# Patient Record
Sex: FEMALE | Race: WHITE | Employment: OTHER
[De-identification: names, ages, dates, MRNs, and addresses within clinical notes are randomized per-mention and may not be internally consistent; named-entity substitution may affect disease eponyms.]

---

## 2017-01-03 ENCOUNTER — CARE COORDINATION (OUTPATIENT)
Dept: CARE COORDINATION | Facility: CLINIC | Age: 82
End: 2017-01-03

## 2017-01-03 ENCOUNTER — TELEPHONE (OUTPATIENT)
Dept: INTERNAL MEDICINE | Facility: CLINIC | Age: 82
End: 2017-01-03

## 2017-01-09 ENCOUNTER — OFFICE VISIT (OUTPATIENT)
Dept: INTERNAL MEDICINE | Facility: CLINIC | Age: 82
End: 2017-01-09

## 2017-01-09 VITALS
BODY MASS INDEX: 34.22 KG/M2 | DIASTOLIC BLOOD PRESSURE: 62 MMHG | SYSTOLIC BLOOD PRESSURE: 110 MMHG | WEIGHT: 163 LBS | RESPIRATION RATE: 14 BRPM | HEIGHT: 58 IN

## 2017-01-09 DIAGNOSIS — F31.31 BIPOLAR AFFECTIVE DISORDER, CURRENTLY DEPRESSED, MILD (HCC): ICD-10-CM

## 2017-01-09 DIAGNOSIS — I50.42 HEART FAILURE, SYSTOLIC AND DIASTOLIC, CHRONIC (HCC): ICD-10-CM

## 2017-01-09 DIAGNOSIS — N18.4 CHRONIC KIDNEY DISEASE (CKD), STAGE IV (SEVERE) (HCC): ICD-10-CM

## 2017-01-09 DIAGNOSIS — N18.30 CKD (CHRONIC KIDNEY DISEASE) STAGE 3, GFR 30-59 ML/MIN (HCC): Primary | ICD-10-CM

## 2017-01-09 PROCEDURE — 99214 OFFICE O/P EST MOD 30 MIN: CPT | Performed by: INTERNAL MEDICINE

## 2017-01-09 ASSESSMENT — ENCOUNTER SYMPTOMS
ABDOMINAL PAIN: 0
SHORTNESS OF BREATH: 0

## 2017-01-11 ENCOUNTER — CARE COORDINATION (OUTPATIENT)
Dept: CARE COORDINATION | Facility: CLINIC | Age: 82
End: 2017-01-11

## 2017-01-18 ENCOUNTER — TELEPHONE (OUTPATIENT)
Dept: INTERNAL MEDICINE | Facility: CLINIC | Age: 82
End: 2017-01-18

## 2017-02-28 ENCOUNTER — OFFICE VISIT (OUTPATIENT)
Dept: INTERNAL MEDICINE | Facility: CLINIC | Age: 82
End: 2017-02-28

## 2017-02-28 VITALS
BODY MASS INDEX: 34.22 KG/M2 | RESPIRATION RATE: 14 BRPM | DIASTOLIC BLOOD PRESSURE: 62 MMHG | SYSTOLIC BLOOD PRESSURE: 122 MMHG | WEIGHT: 163 LBS | HEIGHT: 58 IN

## 2017-02-28 DIAGNOSIS — K21.9 GASTROESOPHAGEAL REFLUX DISEASE WITHOUT ESOPHAGITIS: ICD-10-CM

## 2017-02-28 DIAGNOSIS — I25.10 CORONARY ARTERY DISEASE INVOLVING NATIVE CORONARY ARTERY OF NATIVE HEART WITHOUT ANGINA PECTORIS: ICD-10-CM

## 2017-02-28 DIAGNOSIS — F31.9 BIPOLAR 1 DISORDER, DEPRESSED (HCC): Primary | ICD-10-CM

## 2017-02-28 DIAGNOSIS — I50.40 COMBINED SYSTOLIC AND DIASTOLIC CONGESTIVE HEART FAILURE, UNSPECIFIED CONGESTIVE HEART FAILURE CHRONICITY: ICD-10-CM

## 2017-02-28 PROCEDURE — 99214 OFFICE O/P EST MOD 30 MIN: CPT | Performed by: INTERNAL MEDICINE

## 2017-03-01 ASSESSMENT — ENCOUNTER SYMPTOMS
ABDOMINAL PAIN: 0
SHORTNESS OF BREATH: 1

## 2017-03-15 ENCOUNTER — TELEPHONE (OUTPATIENT)
Dept: INTERNAL MEDICINE CLINIC | Age: 82
End: 2017-03-15

## 2017-03-28 ENCOUNTER — OFFICE VISIT (OUTPATIENT)
Dept: INTERNAL MEDICINE CLINIC | Age: 82
End: 2017-03-28
Payer: COMMERCIAL

## 2017-03-28 VITALS
SYSTOLIC BLOOD PRESSURE: 122 MMHG | HEIGHT: 58 IN | WEIGHT: 165 LBS | DIASTOLIC BLOOD PRESSURE: 70 MMHG | BODY MASS INDEX: 34.63 KG/M2

## 2017-03-28 DIAGNOSIS — R19.09 SUPRAPUBIC MASS: Primary | ICD-10-CM

## 2017-03-28 DIAGNOSIS — F31.9 BIPOLAR 1 DISORDER, DEPRESSED (HCC): ICD-10-CM

## 2017-03-28 DIAGNOSIS — K21.9 GASTROESOPHAGEAL REFLUX DISEASE WITHOUT ESOPHAGITIS: ICD-10-CM

## 2017-03-28 DIAGNOSIS — K21.9 GASTROESOPHAGEAL REFLUX DISEASE, ESOPHAGITIS PRESENCE NOT SPECIFIED: ICD-10-CM

## 2017-03-28 DIAGNOSIS — I10 ESSENTIAL HYPERTENSION: ICD-10-CM

## 2017-03-28 PROCEDURE — 99214 OFFICE O/P EST MOD 30 MIN: CPT | Performed by: NURSE PRACTITIONER

## 2017-03-28 RX ORDER — OMEPRAZOLE 20 MG/1
CAPSULE, DELAYED RELEASE ORAL
Qty: 90 CAPSULE | Refills: 0 | Status: SHIPPED | OUTPATIENT
Start: 2017-03-28 | End: 2017-10-23 | Stop reason: SDUPTHER

## 2017-03-28 ASSESSMENT — ENCOUNTER SYMPTOMS
DIARRHEA: 0
NAUSEA: 0
ABDOMINAL PAIN: 1
VOMITING: 0
BLOOD IN STOOL: 0
CONSTIPATION: 0

## 2017-03-30 ENCOUNTER — HOSPITAL ENCOUNTER (OUTPATIENT)
Dept: ULTRASOUND IMAGING | Age: 82
Discharge: HOME | End: 2017-03-30
Payer: COMMERCIAL

## 2017-03-30 DIAGNOSIS — R19.07 GENERALIZED SWELLING, MASS, OR LUMP OF ABDOMEN OR PELVIS: ICD-10-CM

## 2017-03-30 PROCEDURE — 76857 US EXAM PELVIC LIMITED: CPT

## 2017-03-31 PROBLEM — K40.90 LEFT INGUINAL HERNIA: Status: ACTIVE | Noted: 2017-03-31

## 2017-04-01 ENCOUNTER — PATIENT MESSAGE (OUTPATIENT)
Dept: INTERNAL MEDICINE CLINIC | Age: 82
End: 2017-04-01

## 2017-04-04 ENCOUNTER — OFFICE VISIT (OUTPATIENT)
Dept: INTERNAL MEDICINE CLINIC | Age: 82
End: 2017-04-04
Payer: COMMERCIAL

## 2017-04-04 ENCOUNTER — HOSPITAL ENCOUNTER (OUTPATIENT)
Age: 82
Setting detail: SPECIMEN
Discharge: HOME | End: 2017-04-04
Payer: COMMERCIAL

## 2017-04-04 VITALS
RESPIRATION RATE: 14 BRPM | HEIGHT: 58 IN | WEIGHT: 164 LBS | BODY MASS INDEX: 34.43 KG/M2 | SYSTOLIC BLOOD PRESSURE: 104 MMHG | DIASTOLIC BLOOD PRESSURE: 66 MMHG

## 2017-04-04 DIAGNOSIS — J18.9 CAP (COMMUNITY ACQUIRED PNEUMONIA): ICD-10-CM

## 2017-04-04 DIAGNOSIS — I50.40 COMBINED SYSTOLIC AND DIASTOLIC CONGESTIVE HEART FAILURE, UNSPECIFIED CONGESTIVE HEART FAILURE CHRONICITY: ICD-10-CM

## 2017-04-04 DIAGNOSIS — N18.30 CKD (CHRONIC KIDNEY DISEASE) STAGE 3, GFR 30-59 ML/MIN (HCC): ICD-10-CM

## 2017-04-04 DIAGNOSIS — I20.9 ANGINA PECTORIS (HCC): ICD-10-CM

## 2017-04-04 DIAGNOSIS — K40.90 UNILATERAL INGUINAL HERNIA WITHOUT OBSTRUCTION OR GANGRENE, RECURRENCE NOT SPECIFIED: Primary | ICD-10-CM

## 2017-04-04 DIAGNOSIS — E03.8 OTHER SPECIFIED HYPOTHYROIDISM: ICD-10-CM

## 2017-04-04 PROBLEM — E03.9 HYPOTHYROIDISM: Status: ACTIVE | Noted: 2017-04-04

## 2017-04-04 LAB
ABSOLUTE EOS #: 0.3 K/UL (ref 0–0.4)
ABSOLUTE LYMPH #: 1.5 K/UL (ref 1–4.8)
ABSOLUTE MONO #: 0.6 K/UL (ref 0.1–1.2)
ALBUMIN SERPL-MCNC: 4.2 G/DL (ref 3.5–5.2)
ALBUMIN/GLOBULIN RATIO: 1.7 (ref 1–2.5)
ALP BLD-CCNC: 70 U/L (ref 35–104)
ALT SERPL-CCNC: 18 U/L (ref 5–33)
ANION GAP SERPL CALCULATED.3IONS-SCNC: 15 MMOL/L (ref 9–17)
AST SERPL-CCNC: 23 U/L
BASOPHILS # BLD: 0 % (ref 0–2)
BASOPHILS ABSOLUTE: 0 K/UL (ref 0–0.2)
BILIRUB SERPL-MCNC: 0.42 MG/DL (ref 0.3–1.2)
BUN BLDV-MCNC: 29 MG/DL (ref 8–23)
BUN/CREAT BLD: ABNORMAL (ref 9–20)
CALCIUM SERPL-MCNC: 9.1 MG/DL (ref 8.6–10.4)
CHLORIDE BLD-SCNC: 99 MMOL/L (ref 98–107)
CO2: 28 MMOL/L (ref 20–31)
CREAT SERPL-MCNC: 1.59 MG/DL (ref 0.5–0.9)
DIFFERENTIAL TYPE: ABNORMAL
EOSINOPHILS RELATIVE PERCENT: 5 % (ref 1–4)
GFR AFRICAN AMERICAN: 37 ML/MIN
GFR NON-AFRICAN AMERICAN: 31 ML/MIN
GFR SERPL CREATININE-BSD FRML MDRD: ABNORMAL ML/MIN/{1.73_M2}
GFR SERPL CREATININE-BSD FRML MDRD: ABNORMAL ML/MIN/{1.73_M2}
GLUCOSE BLD-MCNC: 83 MG/DL (ref 70–99)
HCT VFR BLD CALC: 38.2 % (ref 36–46)
HEMOGLOBIN: 12.3 G/DL (ref 12–16)
LYMPHOCYTES # BLD: 23 % (ref 24–44)
MCH RBC QN AUTO: 30.7 PG (ref 26–34)
MCHC RBC AUTO-ENTMCNC: 32.3 G/DL (ref 31–37)
MCV RBC AUTO: 95.1 FL (ref 80–100)
MONOCYTES # BLD: 9 % (ref 2–11)
PDW BLD-RTO: 16.1 % (ref 12.5–15.4)
PLATELET # BLD: 240 K/UL (ref 140–450)
PLATELET ESTIMATE: ABNORMAL
PMV BLD AUTO: 9.6 FL (ref 6–12)
POTASSIUM SERPL-SCNC: 5.1 MMOL/L (ref 3.7–5.3)
RBC # BLD: 4.02 M/UL (ref 4–5.2)
RBC # BLD: ABNORMAL 10*6/UL
SEG NEUTROPHILS: 63 % (ref 36–66)
SEGMENTED NEUTROPHILS ABSOLUTE COUNT: 4.1 K/UL (ref 1.8–7.7)
SODIUM BLD-SCNC: 142 MMOL/L (ref 135–144)
TOTAL PROTEIN: 6.7 G/DL (ref 6.4–8.3)
WBC # BLD: 6.6 K/UL (ref 3.5–11)
WBC # BLD: ABNORMAL 10*3/UL

## 2017-04-04 PROCEDURE — 99213 OFFICE O/P EST LOW 20 MIN: CPT | Performed by: INTERNAL MEDICINE

## 2017-04-04 ASSESSMENT — ENCOUNTER SYMPTOMS
SHORTNESS OF BREATH: 1
ABDOMINAL PAIN: 1

## 2017-04-19 RX ORDER — SERTRALINE HYDROCHLORIDE 100 MG/1
TABLET, FILM COATED ORAL
Qty: 30 TABLET | Refills: 8 | Status: SHIPPED | OUTPATIENT
Start: 2017-04-19 | End: 2017-12-11 | Stop reason: SDUPTHER

## 2017-04-25 ENCOUNTER — TELEPHONE (OUTPATIENT)
Dept: INTERNAL MEDICINE CLINIC | Age: 82
End: 2017-04-25

## 2017-04-25 DIAGNOSIS — I50.40 COMBINED SYSTOLIC AND DIASTOLIC CONGESTIVE HEART FAILURE, UNSPECIFIED CONGESTIVE HEART FAILURE CHRONICITY: Primary | ICD-10-CM

## 2017-04-25 RX ORDER — BUMETANIDE 1 MG/1
2 TABLET ORAL DAILY
Qty: 30 TABLET | Refills: 3 | Status: ON HOLD | OUTPATIENT
Start: 2017-04-25 | End: 2017-06-24 | Stop reason: HOSPADM

## 2017-05-01 RX ORDER — SPIRONOLACTONE 25 MG/1
TABLET ORAL
Qty: 30 TABLET | Refills: 2 | Status: SHIPPED | OUTPATIENT
Start: 2017-05-01 | End: 2017-08-02 | Stop reason: SDUPTHER

## 2017-05-08 ENCOUNTER — OFFICE VISIT (OUTPATIENT)
Dept: INTERNAL MEDICINE CLINIC | Age: 82
End: 2017-05-08
Payer: COMMERCIAL

## 2017-05-08 VITALS
DIASTOLIC BLOOD PRESSURE: 62 MMHG | BODY MASS INDEX: 34.22 KG/M2 | SYSTOLIC BLOOD PRESSURE: 102 MMHG | WEIGHT: 163 LBS | RESPIRATION RATE: 16 BRPM | HEIGHT: 58 IN

## 2017-05-08 DIAGNOSIS — N18.30 CKD (CHRONIC KIDNEY DISEASE) STAGE 3, GFR 30-59 ML/MIN (HCC): ICD-10-CM

## 2017-05-08 DIAGNOSIS — K21.9 GASTROESOPHAGEAL REFLUX DISEASE WITHOUT ESOPHAGITIS: ICD-10-CM

## 2017-05-08 DIAGNOSIS — I50.40 COMBINED SYSTOLIC AND DIASTOLIC CONGESTIVE HEART FAILURE, UNSPECIFIED CONGESTIVE HEART FAILURE CHRONICITY: Primary | ICD-10-CM

## 2017-05-08 PROCEDURE — 99214 OFFICE O/P EST MOD 30 MIN: CPT | Performed by: INTERNAL MEDICINE

## 2017-05-08 RX ORDER — KETOCONAZOLE 20 MG/ML
SHAMPOO TOPICAL
Qty: 2 BOTTLE | Refills: 5 | Status: ON HOLD | OUTPATIENT
Start: 2017-05-08 | End: 2017-06-24 | Stop reason: HOSPADM

## 2017-05-08 ASSESSMENT — ENCOUNTER SYMPTOMS
ABDOMINAL PAIN: 0
SHORTNESS OF BREATH: 1

## 2017-05-09 RX ORDER — OMEPRAZOLE 20 MG/1
CAPSULE, DELAYED RELEASE ORAL
Qty: 30 CAPSULE | Refills: 5 | Status: ON HOLD | OUTPATIENT
Start: 2017-05-09 | End: 2017-06-24 | Stop reason: HOSPADM

## 2017-05-23 ENCOUNTER — TELEPHONE (OUTPATIENT)
Dept: INTERNAL MEDICINE CLINIC | Age: 82
End: 2017-05-23

## 2017-06-02 ENCOUNTER — TELEPHONE (OUTPATIENT)
Dept: INTERNAL MEDICINE CLINIC | Age: 82
End: 2017-06-02

## 2017-06-02 DIAGNOSIS — N18.30 CKD (CHRONIC KIDNEY DISEASE) STAGE 3, GFR 30-59 ML/MIN (HCC): Primary | ICD-10-CM

## 2017-06-23 ENCOUNTER — HOSPITAL ENCOUNTER (OUTPATIENT)
Age: 82
Setting detail: OBSERVATION
LOS: 1 days | Discharge: HOME | End: 2017-06-24
Attending: EMERGENCY MEDICINE | Admitting: INTERNAL MEDICINE
Payer: COMMERCIAL

## 2017-06-23 ENCOUNTER — APPOINTMENT (OUTPATIENT)
Dept: GENERAL RADIOLOGY | Age: 82
End: 2017-06-23
Payer: COMMERCIAL

## 2017-06-23 DIAGNOSIS — R07.9 CHEST PAIN, UNSPECIFIED TYPE: Primary | ICD-10-CM

## 2017-06-23 PROBLEM — I25.110 CORONARY ARTERY DISEASE INVOLVING NATIVE CORONARY ARTERY OF NATIVE HEART WITH UNSTABLE ANGINA PECTORIS (HCC): Status: ACTIVE | Noted: 2017-06-23

## 2017-06-23 LAB
ABSOLUTE EOS #: 0.4 K/UL (ref 0–0.4)
ABSOLUTE LYMPH #: 1.2 K/UL (ref 1–4.8)
ABSOLUTE MONO #: 0.4 K/UL (ref 0.1–1.3)
ALBUMIN SERPL-MCNC: 4.2 G/DL (ref 3.5–5.2)
ALBUMIN/GLOBULIN RATIO: ABNORMAL (ref 1–2.5)
ALP BLD-CCNC: 69 U/L (ref 35–104)
ALT SERPL-CCNC: 17 U/L (ref 5–33)
ANION GAP SERPL CALCULATED.3IONS-SCNC: 12 MMOL/L (ref 9–17)
AST SERPL-CCNC: 25 U/L
BASOPHILS # BLD: 1 %
BASOPHILS ABSOLUTE: 0 K/UL (ref 0–0.2)
BILIRUB SERPL-MCNC: 0.4 MG/DL (ref 0.3–1.2)
BNP INTERPRETATION: ABNORMAL
BUN BLDV-MCNC: 34 MG/DL (ref 8–23)
BUN/CREAT BLD: ABNORMAL (ref 9–20)
CALCIUM SERPL-MCNC: 9.2 MG/DL (ref 8.6–10.4)
CHLORIDE BLD-SCNC: 99 MMOL/L (ref 98–107)
CO2: 29 MMOL/L (ref 20–31)
CREAT SERPL-MCNC: 1.74 MG/DL (ref 0.5–0.9)
DIFFERENTIAL TYPE: NORMAL
EOSINOPHILS RELATIVE PERCENT: 7 %
GFR AFRICAN AMERICAN: 33 ML/MIN
GFR NON-AFRICAN AMERICAN: 28 ML/MIN
GFR SERPL CREATININE-BSD FRML MDRD: ABNORMAL ML/MIN/{1.73_M2}
GFR SERPL CREATININE-BSD FRML MDRD: ABNORMAL ML/MIN/{1.73_M2}
GLUCOSE BLD-MCNC: 116 MG/DL (ref 70–99)
HCT VFR BLD CALC: 37.9 % (ref 36–46)
HEMOGLOBIN: 12.3 G/DL (ref 12–16)
INR BLD: 1
LYMPHOCYTES # BLD: 22 %
MAGNESIUM: 2.6 MG/DL (ref 1.6–2.6)
MCH RBC QN AUTO: 30.2 PG (ref 26–34)
MCHC RBC AUTO-ENTMCNC: 32.4 G/DL (ref 31–37)
MCV RBC AUTO: 93.4 FL (ref 80–100)
MONOCYTES # BLD: 8 %
PDW BLD-RTO: 14 % (ref 11.5–14.9)
PLATELET # BLD: 197 K/UL (ref 150–450)
PLATELET ESTIMATE: NORMAL
PMV BLD AUTO: 8.2 FL (ref 6–12)
POTASSIUM SERPL-SCNC: 4.4 MMOL/L (ref 3.7–5.3)
PRO-BNP: 374 PG/ML
PROTHROMBIN TIME: 10.6 SEC (ref 9.7–12)
RBC # BLD: 4.06 M/UL (ref 4–5.2)
RBC # BLD: NORMAL 10*6/UL
SEG NEUTROPHILS: 62 %
SEGMENTED NEUTROPHILS ABSOLUTE COUNT: 3.4 K/UL (ref 1.3–9.1)
SODIUM BLD-SCNC: 140 MMOL/L (ref 135–144)
TOTAL PROTEIN: 6.5 G/DL (ref 6.4–8.3)
TROPONIN INTERP: NORMAL
TROPONIN T: <0.03 NG/ML
TSH SERPL DL<=0.05 MIU/L-ACNC: 1.15 MIU/L (ref 0.3–5)
WBC # BLD: 5.4 K/UL (ref 3.5–11)
WBC # BLD: NORMAL 10*3/UL

## 2017-06-23 PROCEDURE — 2580000003 HC RX 258: Performed by: FAMILY MEDICINE

## 2017-06-23 PROCEDURE — 85025 COMPLETE CBC W/AUTO DIFF WBC: CPT

## 2017-06-23 PROCEDURE — 80053 COMPREHEN METABOLIC PANEL: CPT

## 2017-06-23 PROCEDURE — 71020 XR CHEST STANDARD TWO VW: CPT

## 2017-06-23 PROCEDURE — 85610 PROTHROMBIN TIME: CPT

## 2017-06-23 PROCEDURE — 96374 THER/PROPH/DIAG INJ IV PUSH: CPT

## 2017-06-23 PROCEDURE — 6370000000 HC RX 637 (ALT 250 FOR IP): Performed by: FAMILY MEDICINE

## 2017-06-23 PROCEDURE — 84484 ASSAY OF TROPONIN QUANT: CPT

## 2017-06-23 PROCEDURE — 36415 COLL VENOUS BLD VENIPUNCTURE: CPT

## 2017-06-23 PROCEDURE — 99220 PR INITIAL OBSERVATION CARE/DAY 70 MINUTES: CPT | Performed by: INTERNAL MEDICINE

## 2017-06-23 PROCEDURE — 84443 ASSAY THYROID STIM HORMONE: CPT

## 2017-06-23 PROCEDURE — 6360000002 HC RX W HCPCS: Performed by: EMERGENCY MEDICINE

## 2017-06-23 PROCEDURE — 99285 EMERGENCY DEPT VISIT HI MDM: CPT

## 2017-06-23 PROCEDURE — 83880 ASSAY OF NATRIURETIC PEPTIDE: CPT

## 2017-06-23 PROCEDURE — G0378 HOSPITAL OBSERVATION PER HR: HCPCS

## 2017-06-23 PROCEDURE — 83735 ASSAY OF MAGNESIUM: CPT

## 2017-06-23 PROCEDURE — 93005 ELECTROCARDIOGRAM TRACING: CPT

## 2017-06-23 PROCEDURE — 6370000000 HC RX 637 (ALT 250 FOR IP): Performed by: EMERGENCY MEDICINE

## 2017-06-23 RX ORDER — ONDANSETRON 2 MG/ML
4 INJECTION INTRAMUSCULAR; INTRAVENOUS EVERY 6 HOURS PRN
Status: DISCONTINUED | OUTPATIENT
Start: 2017-06-23 | End: 2017-06-24 | Stop reason: HOSPADM

## 2017-06-23 RX ORDER — ACETAMINOPHEN 325 MG/1
650 TABLET ORAL EVERY 4 HOURS PRN
Status: DISCONTINUED | OUTPATIENT
Start: 2017-06-23 | End: 2017-06-24 | Stop reason: HOSPADM

## 2017-06-23 RX ORDER — ASPIRIN 325 MG
325 TABLET ORAL ONCE
Status: COMPLETED | OUTPATIENT
Start: 2017-06-23 | End: 2017-06-23

## 2017-06-23 RX ORDER — LOSARTAN POTASSIUM 25 MG/1
12.5 TABLET ORAL DAILY
Status: DISCONTINUED | OUTPATIENT
Start: 2017-06-23 | End: 2017-06-24 | Stop reason: HOSPADM

## 2017-06-23 RX ORDER — SPIRONOLACTONE 25 MG/1
25 TABLET ORAL DAILY
Status: DISCONTINUED | OUTPATIENT
Start: 2017-06-24 | End: 2017-06-24 | Stop reason: HOSPADM

## 2017-06-23 RX ORDER — RANOLAZINE 500 MG/1
500 TABLET, EXTENDED RELEASE ORAL 2 TIMES DAILY
Status: DISCONTINUED | OUTPATIENT
Start: 2017-06-23 | End: 2017-06-24 | Stop reason: HOSPADM

## 2017-06-23 RX ORDER — SODIUM CHLORIDE 0.9 % (FLUSH) 0.9 %
10 SYRINGE (ML) INJECTION EVERY 12 HOURS SCHEDULED
Status: DISCONTINUED | OUTPATIENT
Start: 2017-06-23 | End: 2017-06-24 | Stop reason: HOSPADM

## 2017-06-23 RX ORDER — PANTOPRAZOLE SODIUM 40 MG/1
40 TABLET, DELAYED RELEASE ORAL
Status: DISCONTINUED | OUTPATIENT
Start: 2017-06-24 | End: 2017-06-24 | Stop reason: CLARIF

## 2017-06-23 RX ORDER — MORPHINE SULFATE 4 MG/ML
4 INJECTION, SOLUTION INTRAMUSCULAR; INTRAVENOUS
Status: DISCONTINUED | OUTPATIENT
Start: 2017-06-23 | End: 2017-06-24 | Stop reason: HOSPADM

## 2017-06-23 RX ORDER — ASPIRIN 81 MG/1
81 TABLET ORAL DAILY
Status: DISCONTINUED | OUTPATIENT
Start: 2017-06-24 | End: 2017-06-24 | Stop reason: HOSPADM

## 2017-06-23 RX ORDER — ALBUTEROL SULFATE 90 UG/1
2 AEROSOL, METERED RESPIRATORY (INHALATION) EVERY 6 HOURS PRN
Status: DISCONTINUED | OUTPATIENT
Start: 2017-06-23 | End: 2017-06-24 | Stop reason: HOSPADM

## 2017-06-23 RX ORDER — POTASSIUM CHLORIDE 20 MEQ/1
40 TABLET, EXTENDED RELEASE ORAL PRN
Status: DISCONTINUED | OUTPATIENT
Start: 2017-06-23 | End: 2017-06-24 | Stop reason: HOSPADM

## 2017-06-23 RX ORDER — ATORVASTATIN CALCIUM 40 MG/1
40 TABLET, FILM COATED ORAL NIGHTLY
Status: DISCONTINUED | OUTPATIENT
Start: 2017-06-23 | End: 2017-06-24 | Stop reason: HOSPADM

## 2017-06-23 RX ORDER — SERTRALINE HYDROCHLORIDE 100 MG/1
100 TABLET, FILM COATED ORAL DAILY
Status: DISCONTINUED | OUTPATIENT
Start: 2017-06-23 | End: 2017-06-24 | Stop reason: HOSPADM

## 2017-06-23 RX ORDER — SODIUM CHLORIDE 0.9 % (FLUSH) 0.9 %
10 SYRINGE (ML) INJECTION PRN
Status: DISCONTINUED | OUTPATIENT
Start: 2017-06-23 | End: 2017-06-24 | Stop reason: HOSPADM

## 2017-06-23 RX ORDER — POTASSIUM CHLORIDE 7.45 MG/ML
10 INJECTION INTRAVENOUS PRN
Status: DISCONTINUED | OUTPATIENT
Start: 2017-06-23 | End: 2017-06-24 | Stop reason: HOSPADM

## 2017-06-23 RX ORDER — NITROGLYCERIN 0.4 MG/1
0.4 TABLET SUBLINGUAL ONCE
Status: COMPLETED | OUTPATIENT
Start: 2017-06-23 | End: 2017-06-23

## 2017-06-23 RX ORDER — MORPHINE SULFATE 2 MG/ML
2 INJECTION, SOLUTION INTRAMUSCULAR; INTRAVENOUS
Status: DISCONTINUED | OUTPATIENT
Start: 2017-06-23 | End: 2017-06-24 | Stop reason: HOSPADM

## 2017-06-23 RX ORDER — BISACODYL 10 MG
10 SUPPOSITORY, RECTAL RECTAL DAILY PRN
Status: DISCONTINUED | OUTPATIENT
Start: 2017-06-23 | End: 2017-06-24 | Stop reason: HOSPADM

## 2017-06-23 RX ORDER — MORPHINE SULFATE 2 MG/ML
2 INJECTION, SOLUTION INTRAMUSCULAR; INTRAVENOUS ONCE
Status: COMPLETED | OUTPATIENT
Start: 2017-06-23 | End: 2017-06-23

## 2017-06-23 RX ORDER — LEVOTHYROXINE SODIUM 0.1 MG/1
100 TABLET ORAL DAILY
Status: DISCONTINUED | OUTPATIENT
Start: 2017-06-24 | End: 2017-06-24 | Stop reason: HOSPADM

## 2017-06-23 RX ORDER — BUMETANIDE 1 MG/1
2 TABLET ORAL DAILY
Status: DISCONTINUED | OUTPATIENT
Start: 2017-06-24 | End: 2017-06-24 | Stop reason: HOSPADM

## 2017-06-23 RX ORDER — POTASSIUM CHLORIDE 20MEQ/15ML
40 LIQUID (ML) ORAL PRN
Status: DISCONTINUED | OUTPATIENT
Start: 2017-06-23 | End: 2017-06-24 | Stop reason: HOSPADM

## 2017-06-23 RX ORDER — MAGNESIUM SULFATE 1 G/100ML
1 INJECTION INTRAVENOUS PRN
Status: DISCONTINUED | OUTPATIENT
Start: 2017-06-23 | End: 2017-06-24 | Stop reason: HOSPADM

## 2017-06-23 RX ADMIN — ATORVASTATIN CALCIUM 40 MG: 40 TABLET, FILM COATED ORAL at 21:40

## 2017-06-23 RX ADMIN — NITROGLYCERIN 0.4 MG: 0.4 TABLET SUBLINGUAL at 16:08

## 2017-06-23 RX ADMIN — RANOLAZINE 500 MG: 500 TABLET, FILM COATED, EXTENDED RELEASE ORAL at 21:41

## 2017-06-23 RX ADMIN — Medication 10 ML: at 21:41

## 2017-06-23 RX ADMIN — MORPHINE SULFATE 2 MG: 2 INJECTION, SOLUTION INTRAMUSCULAR; INTRAVENOUS at 16:15

## 2017-06-23 RX ADMIN — ASPIRIN 325 MG ORAL TABLET 325 MG: 325 PILL ORAL at 16:08

## 2017-06-23 ASSESSMENT — ENCOUNTER SYMPTOMS
CHEST TIGHTNESS: 0
VOMITING: 0
NAUSEA: 0
COUGH: 0
ABDOMINAL DISTENTION: 0
ABDOMINAL PAIN: 0
HEARTBURN: 0
SHORTNESS OF BREATH: 1
SHORTNESS OF BREATH: 0
WHEEZING: 0
CONSTIPATION: 0
BACK PAIN: 0

## 2017-06-23 ASSESSMENT — PAIN DESCRIPTION - LOCATION
LOCATION: CHEST
LOCATION: CHEST;NECK

## 2017-06-23 ASSESSMENT — PAIN SCALES - GENERAL
PAINLEVEL_OUTOF10: 6
PAINLEVEL_OUTOF10: 3
PAINLEVEL_OUTOF10: 3
PAINLEVEL_OUTOF10: 0

## 2017-06-23 ASSESSMENT — PAIN DESCRIPTION - ORIENTATION: ORIENTATION: LEFT

## 2017-06-23 ASSESSMENT — PAIN DESCRIPTION - PAIN TYPE: TYPE: ACUTE PAIN

## 2017-06-24 ENCOUNTER — APPOINTMENT (OUTPATIENT)
Dept: NUCLEAR MEDICINE | Age: 82
End: 2017-06-24
Payer: COMMERCIAL

## 2017-06-24 VITALS
DIASTOLIC BLOOD PRESSURE: 60 MMHG | BODY MASS INDEX: 34.22 KG/M2 | TEMPERATURE: 98.5 F | WEIGHT: 163 LBS | OXYGEN SATURATION: 99 % | SYSTOLIC BLOOD PRESSURE: 117 MMHG | HEART RATE: 65 BPM | HEIGHT: 58 IN | RESPIRATION RATE: 19 BRPM

## 2017-06-24 LAB
ABSOLUTE EOS #: 0.3 K/UL (ref 0–0.4)
ABSOLUTE LYMPH #: 1.4 K/UL (ref 1–4.8)
ABSOLUTE MONO #: 0.3 K/UL (ref 0.1–1.3)
ANION GAP SERPL CALCULATED.3IONS-SCNC: 12 MMOL/L (ref 9–17)
BASOPHILS # BLD: 1 %
BASOPHILS ABSOLUTE: 0 K/UL (ref 0–0.2)
BUN BLDV-MCNC: 36 MG/DL (ref 8–23)
BUN/CREAT BLD: ABNORMAL (ref 9–20)
CALCIUM SERPL-MCNC: 9.1 MG/DL (ref 8.6–10.4)
CHLORIDE BLD-SCNC: 100 MMOL/L (ref 98–107)
CO2: 31 MMOL/L (ref 20–31)
CREAT SERPL-MCNC: 1.81 MG/DL (ref 0.5–0.9)
DIFFERENTIAL TYPE: ABNORMAL
EOSINOPHILS RELATIVE PERCENT: 8 %
GFR AFRICAN AMERICAN: 32 ML/MIN
GFR NON-AFRICAN AMERICAN: 26 ML/MIN
GFR SERPL CREATININE-BSD FRML MDRD: ABNORMAL ML/MIN/{1.73_M2}
GFR SERPL CREATININE-BSD FRML MDRD: ABNORMAL ML/MIN/{1.73_M2}
GLUCOSE BLD-MCNC: 97 MG/DL (ref 70–99)
HCT VFR BLD CALC: 36 % (ref 36–46)
HEMOGLOBIN: 11.7 G/DL (ref 12–16)
LV EF: 65 %
LV EF: 70 %
LVEF MODALITY: NORMAL
LVEF MODALITY: NORMAL
LYMPHOCYTES # BLD: 33 %
MCH RBC QN AUTO: 30.2 PG (ref 26–34)
MCHC RBC AUTO-ENTMCNC: 32.4 G/DL (ref 31–37)
MCV RBC AUTO: 93.1 FL (ref 80–100)
MONOCYTES # BLD: 8 %
PDW BLD-RTO: 13.9 % (ref 11.5–14.9)
PLATELET # BLD: 190 K/UL (ref 150–450)
PLATELET ESTIMATE: ABNORMAL
PMV BLD AUTO: 8.4 FL (ref 6–12)
POTASSIUM SERPL-SCNC: 4.2 MMOL/L (ref 3.7–5.3)
RBC # BLD: 3.86 M/UL (ref 4–5.2)
RBC # BLD: ABNORMAL 10*6/UL
SEG NEUTROPHILS: 50 %
SEGMENTED NEUTROPHILS ABSOLUTE COUNT: 2.2 K/UL (ref 1.3–9.1)
SODIUM BLD-SCNC: 143 MMOL/L (ref 135–144)
TROPONIN INTERP: NORMAL
TROPONIN T: <0.03 NG/ML
WBC # BLD: 4.3 K/UL (ref 3.5–11)
WBC # BLD: ABNORMAL 10*3/UL

## 2017-06-24 PROCEDURE — 36415 COLL VENOUS BLD VENIPUNCTURE: CPT

## 2017-06-24 PROCEDURE — 85025 COMPLETE CBC W/AUTO DIFF WBC: CPT

## 2017-06-24 PROCEDURE — G8987 SELF CARE CURRENT STATUS: HCPCS

## 2017-06-24 PROCEDURE — 2580000003 HC RX 258: Performed by: FAMILY MEDICINE

## 2017-06-24 PROCEDURE — 2580000003 HC RX 258: Performed by: INTERNAL MEDICINE

## 2017-06-24 PROCEDURE — G8988 SELF CARE GOAL STATUS: HCPCS

## 2017-06-24 PROCEDURE — 6360000002 HC RX W HCPCS: Performed by: INTERNAL MEDICINE

## 2017-06-24 PROCEDURE — 93005 ELECTROCARDIOGRAM TRACING: CPT

## 2017-06-24 PROCEDURE — 3430000000 HC RX DIAGNOSTIC RADIOPHARMACEUTICAL: Performed by: INTERNAL MEDICINE

## 2017-06-24 PROCEDURE — A9500 TC99M SESTAMIBI: HCPCS | Performed by: INTERNAL MEDICINE

## 2017-06-24 PROCEDURE — 84484 ASSAY OF TROPONIN QUANT: CPT

## 2017-06-24 PROCEDURE — 93306 TTE W/DOPPLER COMPLETE: CPT

## 2017-06-24 PROCEDURE — G8979 MOBILITY GOAL STATUS: HCPCS

## 2017-06-24 PROCEDURE — 97161 PT EVAL LOW COMPLEX 20 MIN: CPT

## 2017-06-24 PROCEDURE — G8980 MOBILITY D/C STATUS: HCPCS

## 2017-06-24 PROCEDURE — G0378 HOSPITAL OBSERVATION PER HR: HCPCS

## 2017-06-24 PROCEDURE — 97165 OT EVAL LOW COMPLEX 30 MIN: CPT

## 2017-06-24 PROCEDURE — G8978 MOBILITY CURRENT STATUS: HCPCS

## 2017-06-24 PROCEDURE — 78452 HT MUSCLE IMAGE SPECT MULT: CPT

## 2017-06-24 PROCEDURE — G8989 SELF CARE D/C STATUS: HCPCS

## 2017-06-24 PROCEDURE — 93017 CV STRESS TEST TRACING ONLY: CPT

## 2017-06-24 PROCEDURE — 80048 BASIC METABOLIC PNL TOTAL CA: CPT

## 2017-06-24 RX ORDER — NITROGLYCERIN 0.4 MG/1
TABLET SUBLINGUAL
Qty: 25 TABLET | Refills: 3 | Status: SHIPPED | OUTPATIENT
Start: 2017-06-24 | End: 2019-02-20 | Stop reason: SDUPTHER

## 2017-06-24 RX ORDER — OMEPRAZOLE 20 MG/1
20 CAPSULE, DELAYED RELEASE ORAL DAILY
Status: DISCONTINUED | OUTPATIENT
Start: 2017-06-24 | End: 2017-06-24 | Stop reason: HOSPADM

## 2017-06-24 RX ORDER — AMINOPHYLLINE DIHYDRATE 25 MG/ML
100 INJECTION, SOLUTION INTRAVENOUS
Status: DISCONTINUED | OUTPATIENT
Start: 2017-06-24 | End: 2017-06-24 | Stop reason: HOSPADM

## 2017-06-24 RX ORDER — NITROGLYCERIN 0.4 MG/1
0.4 TABLET SUBLINGUAL EVERY 5 MIN PRN
Status: DISCONTINUED | OUTPATIENT
Start: 2017-06-24 | End: 2017-06-24 | Stop reason: HOSPADM

## 2017-06-24 RX ORDER — LOSARTAN POTASSIUM 25 MG/1
12.5 TABLET ORAL DAILY
Qty: 30 TABLET | Refills: 3 | Status: SHIPPED | OUTPATIENT
Start: 2017-06-24 | End: 2018-06-25 | Stop reason: SDUPTHER

## 2017-06-24 RX ORDER — BUMETANIDE 2 MG/1
2 TABLET ORAL DAILY
Qty: 30 TABLET | Refills: 3 | Status: SHIPPED | OUTPATIENT
Start: 2017-06-24 | End: 2018-04-09 | Stop reason: SDUPTHER

## 2017-06-24 RX ORDER — SODIUM CHLORIDE 0.9 % (FLUSH) 0.9 %
10 SYRINGE (ML) INJECTION PRN
Status: DISCONTINUED | OUTPATIENT
Start: 2017-06-24 | End: 2017-06-24 | Stop reason: HOSPADM

## 2017-06-24 RX ORDER — NITROGLYCERIN 0.4 MG/1
TABLET SUBLINGUAL
Qty: 25 TABLET | Refills: 3 | Status: SHIPPED | OUTPATIENT
Start: 2017-06-24 | End: 2017-06-24

## 2017-06-24 RX ORDER — SODIUM CHLORIDE 9 MG/ML
INJECTION, SOLUTION INTRAVENOUS CONTINUOUS
Status: DISCONTINUED | OUTPATIENT
Start: 2017-06-24 | End: 2017-06-24 | Stop reason: HOSPADM

## 2017-06-24 RX ORDER — METOPROLOL TARTRATE 5 MG/5ML
2.5 INJECTION INTRAVENOUS PRN
Status: DISCONTINUED | OUTPATIENT
Start: 2017-06-24 | End: 2017-06-24 | Stop reason: HOSPADM

## 2017-06-24 RX ORDER — 0.9 % SODIUM CHLORIDE 0.9 %
250 INTRAVENOUS SOLUTION INTRAVENOUS ONCE
Status: DISCONTINUED | OUTPATIENT
Start: 2017-06-24 | End: 2017-06-24 | Stop reason: HOSPADM

## 2017-06-24 RX ADMIN — BUMETANIDE 2 MG: 1 TABLET ORAL at 12:45

## 2017-06-24 RX ADMIN — Medication 25 MG: at 12:45

## 2017-06-24 RX ADMIN — TETRAKIS(2-METHOXYISOBUTYLISOCYANIDE)COPPER(I) TETRAFLUOROBORATE 11.72 MILLICURIE: 1 INJECTION, POWDER, LYOPHILIZED, FOR SOLUTION INTRAVENOUS at 07:23

## 2017-06-24 RX ADMIN — REGADENOSON 0.4 MG: 0.08 INJECTION, SOLUTION INTRAVENOUS at 09:23

## 2017-06-24 RX ADMIN — RANOLAZINE 500 MG: 500 TABLET, FILM COATED, EXTENDED RELEASE ORAL at 12:45

## 2017-06-24 RX ADMIN — Medication 10 ML: at 13:11

## 2017-06-24 RX ADMIN — LOSARTAN POTASSIUM 12.5 MG: 25 TABLET ORAL at 12:46

## 2017-06-24 RX ADMIN — LEVOTHYROXINE SODIUM 100 MCG: 0.1 TABLET ORAL at 12:45

## 2017-06-24 RX ADMIN — TETRAKIS(2-METHOXYISOBUTYLISOCYANIDE)COPPER(I) TETRAFLUOROBORATE 32 MILLICURIE: 1 INJECTION, POWDER, LYOPHILIZED, FOR SOLUTION INTRAVENOUS at 09:25

## 2017-06-24 RX ADMIN — SODIUM CHLORIDE: 9 INJECTION, SOLUTION INTRAVENOUS at 13:09

## 2017-06-24 RX ADMIN — OMEPRAZOLE 20 MG: 20 CAPSULE, DELAYED RELEASE ORAL at 12:47

## 2017-06-24 RX ADMIN — Medication 10 ML: at 09:24

## 2017-06-24 RX ADMIN — SERTRALINE HYDROCHLORIDE 100 MG: 100 TABLET, FILM COATED ORAL at 12:46

## 2017-06-24 ASSESSMENT — PAIN SCALES - GENERAL: PAINLEVEL_OUTOF10: 0

## 2017-06-25 LAB
EKG ATRIAL RATE: 71 BPM
EKG ATRIAL RATE: 78 BPM
EKG P AXIS: 54 DEGREES
EKG P AXIS: 55 DEGREES
EKG P-R INTERVAL: 144 MS
EKG P-R INTERVAL: 144 MS
EKG Q-T INTERVAL: 388 MS
EKG Q-T INTERVAL: 460 MS
EKG QRS DURATION: 86 MS
EKG QRS DURATION: 88 MS
EKG QTC CALCULATION (BAZETT): 442 MS
EKG QTC CALCULATION (BAZETT): 499 MS
EKG R AXIS: 10 DEGREES
EKG R AXIS: 12 DEGREES
EKG T AXIS: 53 DEGREES
EKG T AXIS: 61 DEGREES
EKG VENTRICULAR RATE: 71 BPM
EKG VENTRICULAR RATE: 78 BPM

## 2017-07-11 ENCOUNTER — OFFICE VISIT (OUTPATIENT)
Dept: INTERNAL MEDICINE CLINIC | Age: 82
End: 2017-07-11
Payer: COMMERCIAL

## 2017-07-11 VITALS
DIASTOLIC BLOOD PRESSURE: 60 MMHG | SYSTOLIC BLOOD PRESSURE: 112 MMHG | HEIGHT: 58 IN | HEART RATE: 65 BPM | BODY MASS INDEX: 34.43 KG/M2 | WEIGHT: 164 LBS

## 2017-07-11 DIAGNOSIS — G47.30 SLEEP APNEA, UNSPECIFIED TYPE: ICD-10-CM

## 2017-07-11 DIAGNOSIS — N18.2 CHRONIC KIDNEY DISEASE, STAGE 2 (MILD): Primary | ICD-10-CM

## 2017-07-11 DIAGNOSIS — I50.40 COMBINED SYSTOLIC AND DIASTOLIC CONGESTIVE HEART FAILURE, UNSPECIFIED CONGESTIVE HEART FAILURE CHRONICITY: ICD-10-CM

## 2017-07-11 DIAGNOSIS — I10 ESSENTIAL HYPERTENSION: ICD-10-CM

## 2017-07-11 PROCEDURE — 99214 OFFICE O/P EST MOD 30 MIN: CPT | Performed by: INTERNAL MEDICINE

## 2017-07-11 ASSESSMENT — ENCOUNTER SYMPTOMS
EYE ITCHING: 0
ABDOMINAL PAIN: 0
CHOKING: 0
DIARRHEA: 0
COUGH: 1
EYE DISCHARGE: 0
APNEA: 0
BACK PAIN: 0
SHORTNESS OF BREATH: 1
CHEST TIGHTNESS: 0
BLOOD IN STOOL: 0
CONSTIPATION: 0
EYE PAIN: 0
EYE REDNESS: 0
COLOR CHANGE: 0
ABDOMINAL DISTENTION: 0

## 2017-07-12 ENCOUNTER — TELEPHONE (OUTPATIENT)
Dept: INTERNAL MEDICINE CLINIC | Age: 82
End: 2017-07-12

## 2017-07-27 ENCOUNTER — TELEPHONE (OUTPATIENT)
Dept: INTERNAL MEDICINE CLINIC | Age: 82
End: 2017-07-27

## 2017-08-02 RX ORDER — SPIRONOLACTONE 25 MG/1
TABLET ORAL
Qty: 30 TABLET | Refills: 1 | Status: SHIPPED | OUTPATIENT
Start: 2017-08-02 | End: 2017-08-29 | Stop reason: ALTCHOICE

## 2017-08-23 LAB
BASOPHILS ABSOLUTE: 0 /ΜL
BASOPHILS RELATIVE PERCENT: 0.8 %
BUN BLDV-MCNC: 28 MG/DL
CALCIUM SERPL-MCNC: 9.1 MG/DL
CHLORIDE BLD-SCNC: 100 MMOL/L
CO2: 31 MMOL/L
CREAT SERPL-MCNC: 1.98 MG/DL
EOSINOPHILS ABSOLUTE: 0.3 /ΜL
EOSINOPHILS RELATIVE PERCENT: 6 %
GFR CALCULATED: 24
GLUCOSE BLD-MCNC: 96 MG/DL
HCT VFR BLD CALC: 37.9 % (ref 36–46)
HEMOGLOBIN: 12.3 G/DL (ref 12–16)
LYMPHOCYTES ABSOLUTE: 1.3 /ΜL
LYMPHOCYTES RELATIVE PERCENT: 26 %
MCH RBC QN AUTO: 32 PG
MCHC RBC AUTO-ENTMCNC: 32 G/DL
MCV RBC AUTO: 97 FL
MONOCYTES ABSOLUTE: 0.4 /ΜL
MONOCYTES RELATIVE PERCENT: 9 %
NEUTROPHILS ABSOLUTE: 3.1 /ΜL
NEUTROPHILS RELATIVE PERCENT: 59 %
PLATELET # BLD: 227 K/ΜL
PMV BLD AUTO: 10.2 FL
POTASSIUM SERPL-SCNC: 4.3 MMOL/L
RBC # BLD: 3.91 10^6/ΜL
SODIUM BLD-SCNC: 139 MMOL/L
WBC # BLD: 5.2 10^3/ML

## 2017-08-25 DIAGNOSIS — I10 ESSENTIAL HYPERTENSION: ICD-10-CM

## 2017-08-25 DIAGNOSIS — N18.2 CHRONIC KIDNEY DISEASE, STAGE 2 (MILD): ICD-10-CM

## 2017-08-29 ENCOUNTER — OFFICE VISIT (OUTPATIENT)
Dept: INTERNAL MEDICINE CLINIC | Age: 82
End: 2017-08-29
Payer: COMMERCIAL

## 2017-08-29 VITALS
HEIGHT: 58 IN | BODY MASS INDEX: 34.22 KG/M2 | WEIGHT: 163 LBS | HEART RATE: 73 BPM | DIASTOLIC BLOOD PRESSURE: 62 MMHG | SYSTOLIC BLOOD PRESSURE: 128 MMHG

## 2017-08-29 DIAGNOSIS — I50.43 ACUTE ON CHRONIC COMBINED SYSTOLIC AND DIASTOLIC CONGESTIVE HEART FAILURE (HCC): ICD-10-CM

## 2017-08-29 DIAGNOSIS — H93.11 TINNITUS, RIGHT: ICD-10-CM

## 2017-08-29 DIAGNOSIS — N18.2 CHRONIC KIDNEY DISEASE, STAGE 2 (MILD): Primary | ICD-10-CM

## 2017-08-29 PROCEDURE — 99214 OFFICE O/P EST MOD 30 MIN: CPT | Performed by: INTERNAL MEDICINE

## 2017-08-31 ASSESSMENT — ENCOUNTER SYMPTOMS
ABDOMINAL DISTENTION: 0
ABDOMINAL PAIN: 0
CHOKING: 0
CHEST TIGHTNESS: 0
EYE DISCHARGE: 0
SHORTNESS OF BREATH: 1
BLOOD IN STOOL: 0
EYE PAIN: 0
CONSTIPATION: 0
COUGH: 1
DIARRHEA: 0
EYE ITCHING: 0
APNEA: 0
BACK PAIN: 0
COLOR CHANGE: 0
EYE REDNESS: 0

## 2017-09-08 PROBLEM — K40.90 UNILATERAL INGUINAL HERNIA WITHOUT OBSTRUCTION OR GANGRENE: Chronic | Status: ACTIVE | Noted: 2017-04-04

## 2017-09-08 PROBLEM — R07.9 CHEST PAIN: Status: RESOLVED | Noted: 2017-06-23 | Resolved: 2017-09-08

## 2017-09-08 PROBLEM — I50.32 CHRONIC DIASTOLIC HEART FAILURE (HCC): Status: ACTIVE | Noted: 2017-09-08

## 2017-09-08 PROBLEM — I50.32 CHRONIC DIASTOLIC HEART FAILURE (HCC): Chronic | Status: ACTIVE | Noted: 2017-09-08

## 2017-09-08 PROBLEM — I25.110 CORONARY ARTERY DISEASE INVOLVING NATIVE CORONARY ARTERY OF NATIVE HEART WITH UNSTABLE ANGINA PECTORIS (HCC): Chronic | Status: ACTIVE | Noted: 2017-06-23

## 2017-09-12 ENCOUNTER — HOSPITAL ENCOUNTER (OUTPATIENT)
Dept: ULTRASOUND IMAGING | Age: 82
Discharge: HOME | End: 2017-09-12
Payer: COMMERCIAL

## 2017-09-12 ENCOUNTER — HOSPITAL ENCOUNTER (OUTPATIENT)
Age: 82
Discharge: HOME | End: 2017-09-12
Payer: COMMERCIAL

## 2017-09-12 DIAGNOSIS — N18.4 CHRONIC KIDNEY DISEASE, STAGE 4 (SEVERE) (HCC): ICD-10-CM

## 2017-09-12 LAB
ABSOLUTE EOS #: 0.3 K/UL (ref 0–0.4)
ABSOLUTE LYMPH #: 1.2 K/UL (ref 1–4.8)
ABSOLUTE MONO #: 0.4 K/UL (ref 0.1–1.3)
ANION GAP SERPL CALCULATED.3IONS-SCNC: 12 MMOL/L (ref 9–17)
BASOPHILS # BLD: 1 %
BASOPHILS ABSOLUTE: 0.1 K/UL (ref 0–0.2)
BUN BLDV-MCNC: 22 MG/DL (ref 8–23)
BUN/CREAT BLD: ABNORMAL (ref 9–20)
CALCIUM IONIZED: 1.2 MMOL/L (ref 1.13–1.33)
CALCIUM SERPL-MCNC: 9.2 MG/DL (ref 8.6–10.4)
CHLORIDE BLD-SCNC: 100 MMOL/L (ref 98–107)
CO2: 28 MMOL/L (ref 20–31)
CREAT SERPL-MCNC: 1.49 MG/DL (ref 0.5–0.9)
CREATININE URINE: 190.8 MG/DL (ref 28–217)
DIFFERENTIAL TYPE: ABNORMAL
EOSINOPHILS RELATIVE PERCENT: 5 %
FREE KAPPA/LAMBDA RATIO: 1.56 (ref 0.26–1.65)
GFR AFRICAN AMERICAN: 40 ML/MIN
GFR NON-AFRICAN AMERICAN: 33 ML/MIN
GFR SERPL CREATININE-BSD FRML MDRD: ABNORMAL ML/MIN/{1.73_M2}
GFR SERPL CREATININE-BSD FRML MDRD: ABNORMAL ML/MIN/{1.73_M2}
GLUCOSE BLD-MCNC: 107 MG/DL (ref 70–99)
HCT VFR BLD CALC: 35.1 % (ref 36–46)
HEMOGLOBIN: 11.5 G/DL (ref 12–16)
KAPPA FREE LIGHT CHAINS QNT: 2.31 MG/DL (ref 0.37–1.94)
LAMBDA FREE LIGHT CHAINS QNT: 1.48 MG/DL (ref 0.57–2.63)
LYMPHOCYTES # BLD: 22 %
MCH RBC QN AUTO: 31.5 PG (ref 26–34)
MCHC RBC AUTO-ENTMCNC: 32.9 G/DL (ref 31–37)
MCV RBC AUTO: 95.8 FL (ref 80–100)
MONOCYTES # BLD: 8 %
PDW BLD-RTO: 14 % (ref 11.5–14.9)
PHOSPHORUS: 3.1 MG/DL (ref 2.6–4.5)
PLATELET # BLD: 205 K/UL (ref 150–450)
PLATELET ESTIMATE: ABNORMAL
PMV BLD AUTO: 8.7 FL (ref 6–12)
POTASSIUM SERPL-SCNC: 4.9 MMOL/L (ref 3.7–5.3)
PTH INTACT: 59.57 PG/ML (ref 15–65)
RBC # BLD: 3.66 M/UL (ref 4–5.2)
RBC # BLD: ABNORMAL 10*6/UL
SEG NEUTROPHILS: 64 %
SEGMENTED NEUTROPHILS ABSOLUTE COUNT: 3.5 K/UL (ref 1.3–9.1)
SODIUM BLD-SCNC: 140 MMOL/L (ref 135–144)
TOTAL PROTEIN, URINE: 19 MG/DL
WBC # BLD: 5.4 K/UL (ref 3.5–11)
WBC # BLD: ABNORMAL 10*3/UL

## 2017-09-12 PROCEDURE — 36415 COLL VENOUS BLD VENIPUNCTURE: CPT

## 2017-09-12 PROCEDURE — 83883 ASSAY NEPHELOMETRY NOT SPEC: CPT

## 2017-09-12 PROCEDURE — 82330 ASSAY OF CALCIUM: CPT

## 2017-09-12 PROCEDURE — 76770 US EXAM ABDO BACK WALL COMP: CPT

## 2017-09-12 PROCEDURE — 85025 COMPLETE CBC W/AUTO DIFF WBC: CPT

## 2017-09-12 PROCEDURE — 82570 ASSAY OF URINE CREATININE: CPT

## 2017-09-12 PROCEDURE — 82306 VITAMIN D 25 HYDROXY: CPT

## 2017-09-12 PROCEDURE — 86334 IMMUNOFIX E-PHORESIS SERUM: CPT

## 2017-09-12 PROCEDURE — 80048 BASIC METABOLIC PNL TOTAL CA: CPT

## 2017-09-12 PROCEDURE — 83970 ASSAY OF PARATHORMONE: CPT

## 2017-09-12 PROCEDURE — 84156 ASSAY OF PROTEIN URINE: CPT

## 2017-09-12 PROCEDURE — 84100 ASSAY OF PHOSPHORUS: CPT

## 2017-09-13 LAB
PATHOLOGIST: NORMAL
SERUM IFX INTERP: NORMAL
VITAMIN D 25-HYDROXY: 35.4 NG/ML (ref 30–100)

## 2017-09-18 ENCOUNTER — TELEPHONE (OUTPATIENT)
Dept: INTERNAL MEDICINE CLINIC | Age: 82
End: 2017-09-18

## 2017-09-19 ENCOUNTER — OFFICE VISIT (OUTPATIENT)
Dept: INTERNAL MEDICINE CLINIC | Age: 82
End: 2017-09-19
Payer: COMMERCIAL

## 2017-09-19 VITALS
WEIGHT: 162 LBS | BODY MASS INDEX: 34 KG/M2 | OXYGEN SATURATION: 96 % | HEART RATE: 74 BPM | DIASTOLIC BLOOD PRESSURE: 60 MMHG | HEIGHT: 58 IN | SYSTOLIC BLOOD PRESSURE: 116 MMHG

## 2017-09-19 DIAGNOSIS — N18.9 CKD (CHRONIC KIDNEY DISEASE), UNSPECIFIED STAGE: ICD-10-CM

## 2017-09-19 DIAGNOSIS — I50.32 CHRONIC DIASTOLIC HEART FAILURE (HCC): Primary | Chronic | ICD-10-CM

## 2017-09-19 DIAGNOSIS — I10 ESSENTIAL HYPERTENSION: Chronic | ICD-10-CM

## 2017-09-19 PROCEDURE — 99213 OFFICE O/P EST LOW 20 MIN: CPT | Performed by: INTERNAL MEDICINE

## 2017-09-19 RX ORDER — BUMETANIDE 0.5 MG/1
0.5 TABLET ORAL DAILY
Qty: 30 TABLET | Refills: 1 | Status: SHIPPED | OUTPATIENT
Start: 2017-09-19 | End: 2017-10-23 | Stop reason: SDUPTHER

## 2017-09-19 ASSESSMENT — ENCOUNTER SYMPTOMS
COUGH: 0
CONSTIPATION: 0
BACK PAIN: 1
SHORTNESS OF BREATH: 0
EYE REDNESS: 0
DIARRHEA: 0
ABDOMINAL PAIN: 0
EYE PAIN: 0
ABDOMINAL DISTENTION: 0
CHEST TIGHTNESS: 0
BLOOD IN STOOL: 0
COLOR CHANGE: 0
APNEA: 0
CHOKING: 0
EYE DISCHARGE: 0
EYE ITCHING: 0

## 2017-09-20 RX ORDER — LEVOTHYROXINE SODIUM 0.1 MG/1
TABLET ORAL
Qty: 30 TABLET | Refills: 11 | Status: SHIPPED | OUTPATIENT
Start: 2017-09-20 | End: 2017-09-25 | Stop reason: SDUPTHER

## 2017-09-25 RX ORDER — LEVOTHYROXINE SODIUM 0.1 MG/1
100 TABLET ORAL DAILY
Qty: 30 TABLET | Refills: 11 | Status: SHIPPED | OUTPATIENT
Start: 2017-09-25 | End: 2018-09-24 | Stop reason: SDUPTHER

## 2017-10-12 ENCOUNTER — OFFICE VISIT (OUTPATIENT)
Dept: INTERNAL MEDICINE CLINIC | Age: 82
End: 2017-10-12
Payer: COMMERCIAL

## 2017-10-12 VITALS
SYSTOLIC BLOOD PRESSURE: 118 MMHG | BODY MASS INDEX: 34 KG/M2 | DIASTOLIC BLOOD PRESSURE: 72 MMHG | HEIGHT: 58 IN | OXYGEN SATURATION: 99 % | WEIGHT: 162 LBS | HEART RATE: 77 BPM

## 2017-10-12 DIAGNOSIS — B02.9 HERPES ZOSTER WITHOUT COMPLICATION: Primary | ICD-10-CM

## 2017-10-12 PROCEDURE — 99213 OFFICE O/P EST LOW 20 MIN: CPT | Performed by: NURSE PRACTITIONER

## 2017-10-12 RX ORDER — FAMCICLOVIR 500 MG/1
500 TABLET, FILM COATED ORAL 3 TIMES DAILY
Qty: 21 TABLET | Refills: 0 | Status: SHIPPED | OUTPATIENT
Start: 2017-10-12 | End: 2017-10-19

## 2017-10-12 RX ORDER — PREDNISONE 20 MG/1
20 TABLET ORAL 2 TIMES DAILY
Qty: 10 TABLET | Refills: 0 | Status: SHIPPED | OUTPATIENT
Start: 2017-10-12 | End: 2017-10-17

## 2017-10-12 NOTE — PROGRESS NOTES
Visit Information    Have you changed or started any medications since your last visit including any over-the-counter medicines, vitamins, or herbal medicines? no   Are you having any side effects from any of your medications? -  no  Have you stopped taking any of your medications? Is so, why? -  no    Have you seen any other physician or provider since your last visit? No  Have you had any other diagnostic tests since your last visit? No  Have you been seen in the emergency room and/or had an admission to a hospital since we last saw you? No  Have you had your routine dental cleaning in the past 6 months? no    Have you activated your [employer] account? If not, what are your barriers? Yes     Patient Care Team:  Louis Huddleston MD as PCP - [address] [place] [address] as Consulting Physician (Obstetrics & Gynecology)  Mirtha Butterfield MD as Consulting Physician (Nephrology)    Medical History Review  Past Medical, Family, and Social History reviewed and does contribute to the patient presenting condition    Health Maintenance   Topic Date Due    DTaP/Tdap/Td vaccine (1 - Tdap) 09/29/1946    Flu vaccine (1) 09/01/2017    Zostavax vaccine  Addressed    Pneumococcal high/highest risk  Completed     Chief Complaint   Patient presents with    Rash     lt lower back      Chief Complaint   Patient presents with    Rash     lt lower back        [address] [address]  Dept: [phone]  Dept Fax: [phone]    Porter Castro is a 80 y.o. female who presents today for her medical conditions/complaints as noted below. Porter Castro is c/o of Rash (lt lower back )        HPI:     Patient presents with:  Rash: lt lower back x 1 wk. Noticed by mala on mon, nurse thought poss shingles. Itchy. No pain. Pain staying the same. Now drying up.              Past Medical History:   Diagnosis Date    [facility]     CHF (congestive heart failure) ([facility]  Chronic kidney disease     [facility]     Coronary artery disease involving native coronary artery of native heart with unstable angina pectoris ([facility] 6/23/2017    Depression     GERD (gastroesophageal reflux disease)     History of skin cancer     nose x3    History of total abdominal hysterectomy     ovaries removed later    HTN (hypertension) 4/13/2016    Hypothyroid     Hypothyroidism     [facility] 2016    Osteoarthritis     Sleep apnea     Does not use machine      Past Surgical History:   Procedure Laterality Date    BLADDER SUSPENSION      x3    CARDIAC CATHETERIZATION  4-13    severe disease small d1 and PDA    CATARACT REMOVAL Bilateral     CHOLECYSTECTOMY      HYSTERECTOMY      ovaries removed at a later date    NOSE SURGERY      cancer removed    SALPINGO-OOPHORECTOMY         Family History   Problem Relation Age of Onset    Heart Disease Mother     Cancer Father     Stroke Sister     Heart Disease Sister        Social History   Substance Use Topics    Smoking status: Former Smoker    Smokeless tobacco: Never Used    Alcohol use No      Current Outpatient Prescriptions   Medication Sig Dispense Refill    famciclovir (FAMVIR) 500 MG tablet Take 1 tablet by mouth 3 times daily for 7 days 21 tablet 0    predniSONE (DELTASONE) 20 MG tablet Take 1 tablet by mouth 2 times daily for 10 doses 10 tablet 0    vitamin E 400 UNIT capsule Take 400 Units by mouth daily      magnesium (MAGNESIUM-OXIDE) 250 MG TABS tablet Take 250 mg by mouth daily      B Complex Vitamins (VITAMIN B COMPLEX) TABS Take 1 tablet by mouth daily      levothyroxine (SYNTHROID) 100 MCG tablet Take 1 tablet by mouth daily 30 tablet 11    metoprolol tartrate (LOPRESSOR) 25 MG tablet Take 1 tablet by mouth daily 60 tablet 11    bumetanide (BUMEX) 0.5 MG tablet Take 1 tablet by mouth daily (Patient taking differently: Take 0.5 mg by mouth nightly ) 30 tablet 1    losartan (COZAAR) 25 MG tablet Take 0.5 tablets by mouth daily 30 tablet 3    bumetanide (BUMEX) 2 MG tablet Take 1 tablet by mouth daily 30 tablet 3    nitroGLYCERIN (NITROSTAT) 0.4 MG SL tablet up to max of 3 total doses. If no relief after 1 dose, call 911. 25 tablet 3    sertraline (ZOLOFT) 100 MG tablet TAKE 1 TABLET BY MOUTH DAILY 30 tablet 8    omeprazole (PRILOSEC) 20 MG delayed release capsule TAKE 1 CAPSULE BY MOUTH DAILY 90 capsule 0    Misc. Devices (ROLLER WALKER) MISC 1 each by Does not apply route continuous 1 each 0    atorvastatin (LIPITOR) 40 MG tablet Take 40 mg by mouth nightly      ranolazine (RANEXA) 500 MG extended release tablet Take 500 mg by mouth 2 times daily      acetaminophen (TYLENOL) 500 MG tablet Take 500 mg by mouth every 8 hours as needed for Pain (1-2 tabs) Indications: Pain      docusate sodium (COLACE, DULCOLAX) 100 MG CAPS Take 100 mg by mouth daily 30 capsule 3    Krill Oil 300 MG CAPS Take 1 capsule by mouth daily.  aspirin 81 MG tablet Take 81 mg by mouth daily.  Multiple Vitamins-Minerals (THERAPEUTIC MULTIVITAMIN-MINERALS) tablet Take 1 tablet by mouth daily. No current facility-administered medications for this visit. Allergies   Allergen Reactions    Dye [Iodides] Hives     IVP dye caused reaction with hives          Subjective:      Review of Systems   Skin: Positive for rash. Objective:     Physical Exam   Constitutional: She is oriented to person, place, and time. She appears well-developed and well-nourished. HENT:   Head: Atraumatic. Eyes: EOM are normal.   Musculoskeletal:        Arms:  Herpetic type lesions along dermatome on R lower back, red raised lesion with some scabbed already. Neurological: She is alert and oriented to person, place, and time. Skin: Rash noted. Psychiatric: She has a normal mood and affect. Nursing note and vitals reviewed.     /72   Pulse 77   Ht 4' 9.87\" (1.47 m) Wt 162 lb (73.5 kg)   LMP  (LMP Unknown)   SpO2 99%   BMI 34.01 kg/m²     CBC:   Lab Results   Component Value Date    WBC 5.4 09/12/2017    RBC 3.66 09/12/2017    RBC 4.24 06/03/2012    HGB 11.5 09/12/2017    HCT 35.1 09/12/2017    MCV 95.8 09/12/2017    MCH 31.5 09/12/2017    MCHC 32.9 09/12/2017    RDW 14.0 09/12/2017     09/12/2017     06/03/2012    MPV 8.7 09/12/2017     CMP:    Lab Results   Component Value Date     09/12/2017    K 4.9 09/12/2017     09/12/2017    CO2 28 09/12/2017    BUN 22 09/12/2017    CREATININE 1.49 09/12/2017    GFRAA 40 09/12/2017    LABGLOM 33 09/12/2017    GLUCOSE 107 09/12/2017    GLUCOSE 94 06/03/2012    PROT 6.5 06/23/2017    LABALBU 4.2 06/23/2017    CALCIUM 9.2 09/12/2017    BILITOT 0.40 06/23/2017    ALKPHOS 69 06/23/2017    AST 25 06/23/2017    ALT 17 06/23/2017     Lab Results   Component Value Date    LABA1C 5.0 04/12/2016           Assessment:      1. Herpes zoster without complication  famciclovir (FAMVIR) 500 MG tablet    predniSONE (DELTASONE) 20 MG tablet       Plan:        [place] received counseling on the following healthy behaviors: medication adherence  Reviewed prior labs and health maintenance  Continue current medications, diet and exercise. Discussed use, benefit, and side effects of prescribed medications. Barriers to medication compliance addressed. Patient given educational materials - see patient instructions  Was a self-tracking handout given in paper form or via [employer]? Yes    Requested Prescriptions     Signed Prescriptions Disp Refills    famciclovir (FAMVIR) 500 MG tablet 21 tablet 0     Sig: Take 1 tablet by mouth 3 times daily for 7 days    predniSONE (DELTASONE) 20 MG tablet 10 tablet 0     Sig: Take 1 tablet by mouth 2 times daily for 10 doses       All patient questions answered. Patient voiced understanding. Quality Measures    Body mass index is 34.01 kg/m². Elevated.  Weight control planned discussed Healthy diet and regular exercise. BP: 118/72. Blood pressure is normal. Treatment plan consists of No treatment change needed. Fall Risk 12/9/2016 7/29/2015   2 or more falls in past year? no no   Fall with injury in past year? no no     The patient does not have a history of falls. I did , complete a risk assessment for falls. A plan of care for falls No Treatment plan indicated    Lab Results   Component Value Date    LDLCHOLESTEROL 138 (H) 04/13/2016    (goal LDL reduction with dx if diabetes is 50% LDL reduction)    PHQ Scores 12/9/2016 1/4/2016   PHQ2 Score 0 0   PHQ9 Score 0 0     Interpretation of Total Score Depression Severity: 1-4 = Minimal depression, 5-9 = Mild depression, 10-14 = Moderate depression, 15-19 = Moderately severe depression, 20-27 = Severe depression      No Follow-up on file. No orders of the defined types were placed in this encounter. Orders Placed This Encounter   Medications    famciclovir (FAMVIR) 500 MG tablet     Sig: Take 1 tablet by mouth 3 times daily for 7 days     Dispense:  21 tablet     Refill:  0    predniSONE (DELTASONE) 20 MG tablet     Sig: Take 1 tablet by mouth 2 times daily for 10 doses     Dispense:  10 tablet     Refill:  0       Patient given verbal and/or written educational instructions. Follow up as directed. I have reviewed and agree with the nursing documentation.     Electronically signed by Felecia Ledbetter NP on 10/12/2017 at 3:14 PM

## 2017-10-23 ENCOUNTER — OFFICE VISIT (OUTPATIENT)
Dept: INTERNAL MEDICINE CLINIC | Age: 82
End: 2017-10-23
Payer: COMMERCIAL

## 2017-10-23 VITALS
HEART RATE: 65 BPM | WEIGHT: 164 LBS | HEIGHT: 58 IN | SYSTOLIC BLOOD PRESSURE: 132 MMHG | BODY MASS INDEX: 34.43 KG/M2 | DIASTOLIC BLOOD PRESSURE: 82 MMHG | OXYGEN SATURATION: 96 %

## 2017-10-23 DIAGNOSIS — Z23 NEED FOR VACCINATION: Primary | ICD-10-CM

## 2017-10-23 DIAGNOSIS — K21.9 GASTROESOPHAGEAL REFLUX DISEASE, ESOPHAGITIS PRESENCE NOT SPECIFIED: ICD-10-CM

## 2017-10-23 DIAGNOSIS — H01.9 INFECTED EYE LID: ICD-10-CM

## 2017-10-23 DIAGNOSIS — H00.033 ABSCESS OF RIGHT EYELID: Primary | ICD-10-CM

## 2017-10-23 DIAGNOSIS — I50.32 CHRONIC DIASTOLIC HEART FAILURE (HCC): Chronic | ICD-10-CM

## 2017-10-23 PROCEDURE — G0008 ADMIN INFLUENZA VIRUS VAC: HCPCS | Performed by: INTERNAL MEDICINE

## 2017-10-23 PROCEDURE — 99213 OFFICE O/P EST LOW 20 MIN: CPT | Performed by: INTERNAL MEDICINE

## 2017-10-23 PROCEDURE — 90662 IIV NO PRSV INCREASED AG IM: CPT | Performed by: INTERNAL MEDICINE

## 2017-10-23 RX ORDER — DOXYCYCLINE HYCLATE 100 MG
100 TABLET ORAL 2 TIMES DAILY
Qty: 14 TABLET | Refills: 0 | Status: SHIPPED | OUTPATIENT
Start: 2017-10-23 | End: 2017-10-30

## 2017-10-23 RX ORDER — BUMETANIDE 0.5 MG/1
0.5 TABLET ORAL DAILY
Qty: 30 TABLET | Refills: 1 | Status: SHIPPED | OUTPATIENT
Start: 2017-10-23 | End: 2018-01-08 | Stop reason: ALTCHOICE

## 2017-10-23 RX ORDER — CIPROFLOXACIN HYDROCHLORIDE 3.5 MG/ML
1 SOLUTION/ DROPS TOPICAL 4 TIMES DAILY
Qty: 40 DROP | Refills: 0 | Status: SHIPPED | OUTPATIENT
Start: 2017-10-23 | End: 2017-11-02

## 2017-10-23 RX ORDER — OMEPRAZOLE 20 MG/1
CAPSULE, DELAYED RELEASE ORAL
Qty: 30 CAPSULE | Refills: 5 | Status: SHIPPED | OUTPATIENT
Start: 2017-10-23 | End: 2018-01-08 | Stop reason: ALTCHOICE

## 2017-10-23 ASSESSMENT — ENCOUNTER SYMPTOMS
CHOKING: 0
ABDOMINAL PAIN: 0
BACK PAIN: 0
CONSTIPATION: 0
EYE REDNESS: 0
ABDOMINAL DISTENTION: 0
COUGH: 0
SHORTNESS OF BREATH: 0
APNEA: 0
EYE ITCHING: 0
CHEST TIGHTNESS: 0
EYE PAIN: 1
EYE DISCHARGE: 1
BLOOD IN STOOL: 0
DIARRHEA: 0
COLOR CHANGE: 0

## 2017-10-23 NOTE — PROGRESS NOTES
Subjective:      Patient ID: [place] is a 80 y.o. female. Visit Information    Have you changed or started any medications since your last visit including any over-the-counter medicines, vitamins, or herbal medicines? no   Are you having any side effects from any of your medications? -  no  Have you stopped taking any of your medications? Is so, why? -  no    Have you seen any other physician or provider since your last visit? No  Have you had any other diagnostic tests since your last visit? No  Have you been seen in the emergency room and/or had an admission to a hospital since we last saw you? No  Have you had your routine dental cleaning in the past 6 months? no    Have you activated your [employer] account? If not, what are your barriers?  Yes     Patient Care Team:  Alem Ramos MD as PCP - Jeremy Gifford MD as PCP - S Attributed Provider  Monalisa Yoder DO as Consulting Physician (Obstetrics & Gynecology)  Lin Chavez MD as Consulting Physician (Nephrology)    Medical History Review  Past Medical, Family, and Social History reviewed and does not contribute to the patient presenting condition    Health Maintenance   Topic Date Due    DTaP/Tdap/Td vaccine (1 - Tdap) 09[birth year]29/1946    Flu vaccine (1) 09/01/2017    Zostavax vaccine  Addressed    Pneumococcal high/highest risk  Completed     Chief Complaint   Patient presents with    Eye Problem     pt stated she found cyst over right eye that has gotten worse over past couple days    Other     pt here for flu shot       HPI    Review of Systems    Objective:   Physical Exam    Assessment:            Plan:
eye exhibits no discharge. Left eye exhibits no discharge. No scleral icterus. Neck: Normal range of motion. Neck supple. No JVD present. No tracheal deviation present. No thyromegaly present. Cardiovascular: Normal rate and normal heart sounds. Exam reveals no gallop. No murmur heard. Pulmonary/Chest: Effort normal and breath sounds normal. No stridor. No respiratory distress. She has no wheezes. She has no rales. She exhibits no tenderness. Abdominal: Soft. Bowel sounds are normal. She exhibits no distension. There is no tenderness. There is no rebound and no guarding. Musculoskeletal: Normal range of motion. She exhibits edema. Neurological: She is alert and oriented to person, place, and time. Skin: She is not diaphoretic. Assessment:       1. Need for vaccination    2. Infected eye lid            Plan:     1. Need for vaccination  - INFLUENZA, HIGH DOSE, 65 YRS +, IM, PF, PREFILL SYR, 0.5ML (FLUZONE HD)    2. Infected eye lid  - doxycycline hyclate (VIBRA-TABS) 100 MG tablet; Take 1 tablet by mouth 2 times daily for 7 days  Dispense: 14 tablet; Refill: 0  - ciprofloxacin (CILOXAN) 0.3 % ophthalmic solution; Place 1 drop into the right eye 4 times daily for 10 days  Dispense: 40 drop; Refill: 0  Patient may need incision and drainage, I will  Schedule Appointment with Ophalmologist     · Return in about 4 weeks (around 11/20/2017). · [place] received counseling on the following healthy behaviors: nutrition, exercise and medication adherence    · Reviewed prior labs and health maintenance. · Discussed use, benefit, and side effects of prescribed medications. Barriers to medication compliance addressed. All patient questions answered. Pt voiced understanding.      MD STANISLAV LandaverdeBarn[facility]-Jewish [facility]  10/23/2017, 3:59 PM

## 2017-10-31 ENCOUNTER — OFFICE VISIT (OUTPATIENT)
Dept: INTERNAL MEDICINE CLINIC | Age: 82
End: 2017-10-31
Payer: COMMERCIAL

## 2017-10-31 VITALS
DIASTOLIC BLOOD PRESSURE: 60 MMHG | BODY MASS INDEX: 33.58 KG/M2 | WEIGHT: 160 LBS | SYSTOLIC BLOOD PRESSURE: 110 MMHG | HEIGHT: 58 IN

## 2017-10-31 DIAGNOSIS — Z00.00 ROUTINE HEALTH MAINTENANCE: ICD-10-CM

## 2017-10-31 DIAGNOSIS — I10 ESSENTIAL HYPERTENSION: Primary | Chronic | ICD-10-CM

## 2017-10-31 DIAGNOSIS — H00.011 HORDEOLUM EXTERNUM OF RIGHT UPPER EYELID: ICD-10-CM

## 2017-10-31 DIAGNOSIS — I50.32 CHRONIC DIASTOLIC HEART FAILURE (HCC): Chronic | ICD-10-CM

## 2017-10-31 PROCEDURE — 99212 OFFICE O/P EST SF 10 MIN: CPT | Performed by: INTERNAL MEDICINE

## 2017-10-31 NOTE — PROGRESS NOTES
smokeless tobacco.    FAMILY HISTORY:    Reviewed and No change from previous visit    REVIEW OF SYSTEMS:    CONSTITUTIONAL: Denies: fever, chills  PSYCH: Denies: anxiety, depression  ALLERGIES: Denies: urticaria  EYES: Denies: blurry vision, decreased vision, photophobia, Left Eye infection has improved   ENT: Denies: sore throat, nasal congestion  CARDIOVASCULAR: Denies: chest pain,Positive for  dyspnea on exertion  RESPIRATORY: Denies: cough, hemoptysis, shortness of breath  GI: Denies: Denies: abdominal pain, flank pain  : Denies: Denies: dysuria, frequency/urgency  NEURO: Denies: dizzy/vertigo, headache  MUSCULOSKELETAL: Denies: back pain, joint pain  SKIN: Denies: rash, itching    PHYSICAL EXAM:      Vitals:    10/31/17 1352   BP: 110/60   Weight: 160 lb (72.6 kg)   Height: 4' 9.87\" (1.47 m)     BP Readings from Last 3 Encounters:   10/31/17 110/60   10/23/17 132/82   10/12/17 118/72        General appearance - alert, well appearing, and in no distress, wheel chair Bound   Mental status - alert, oriented to person, place, and time  Eyes - pupils equal and reactive, extraocular eye movements intact, Healing Left eye lid infection   without lesions  Neck - supple, no significant adenopathy  Lymphatics - no palpable lymphadenopathy, no hepatosplenomegaly  Chest - clear to auscultation, no wheezes, rales or rhonchi, symmetric air entry  Heart - normal rate, regular rhythm, normal S1, S2, no murmurs, rubs, clicks or gallops  Abdomen - soft, nontender, nondistended, no masses or organomegaly  Back exam - full range of motion, no tenderness, palpable spasm or pain on motion  Neurological - alert, oriented, normal speech, no focal findings or movement disorder noted  Musculoskeletal - no joint tenderness, deformity, pedal edema present   Extremities - peripheral pulses normal, no pedal edema, no clubbing or cyanosis  Skin - normal coloration and turgor, no rashes, no suspicious skin lesions noted      LABORATORY FINDINGS:    CBC:  Lab Results   Component Value Date    WBC 5.4 09/12/2017    HGB 11.5 09/12/2017     09/12/2017     06/03/2012       BMP:    Lab Results   Component Value Date     09/12/2017    K 4.9 09/12/2017     09/12/2017    CO2 28 09/12/2017    BUN 22 09/12/2017    CREATININE 1.49 09/12/2017    GLUCOSE 107 09/12/2017    GLUCOSE 94 06/03/2012       HEMOGLOBIN A1C:   Lab Results   Component Value Date    LABA1C 5.0 04/12/2016       FASTING LIPID PANEL:  Lab Results   Component Value Date    CHOL 204 (H) 04/13/2016    HDL 47 04/13/2016    TRIG 94 04/13/2016       ASSESSMENT AND PLAN:      1. Essential hypertension  - metoprolol tartrate (LOPRESSOR) 25 MG tablet; Take 0.5 tablets by mouth daily  Dispense: 30 tablet; Refill: 11    2. Chronic diastolic heart failure ([address]- compensated   - Basic Metabolic Panel; Future  - CBC; Future    3. Hordeolum externum of right upper eyelid- Healed , seen by Opthalmologist     4. Routine health maintenance  - Lipid Panel; Future        FOLLOW UP AND INSTRUCTIONS:   Return in about 2 months (around 12/31/2017). · [place] received counseling on the following healthy behaviors: nutrition, exercise and medication adherence    · Discussed use, benefit, and side effects of prescribed medications. Barriers to medication compliance addressed. All patient questions answered. Pt voiced understanding.      · Patient given educational materials - see patient instructions    MD STANISLAV [facility]SS[facility]  11/12/2017, 2:29 PMChronic Disease Visit Information    BP Readings from Last 3 Encounters:   10/31/17 110/60   10/23/17 132/82   10/12/17 118/72          Hemoglobin A1C (%)   Date Value   04/12/2016 5.0   09/05/2014 5.2     LDL Cholesterol (mg/dL)   Date Value   04/13/2016 138 (H)     HDL (mg/dL)   Date Value   04/13/2016 47     BUN (mg/dL)   Date Value   09/12/2017 22     CREATININE (mg/dL)   Date Value   09/12/2017 1.49 (H)     Glucose (mg/dL) Date Value   09/12/2017 107 (H)   06/03/2012 94            Have you changed or started any medications since your last visit including any over-the-counter medicines, vitamins, or herbal medicines? no   Are you having any side effects from any of your medications? -  no  Have you stopped taking any of your medications? Is so, why? -  no    Have you seen any other physician or provider since your last visit? yes  Have you had any other diagnostic tests since your last visit? Yes - Records Obtained  Have you been seen in the emergency room and/or had an admission to a hospital since we last saw you? No  Have you had your annual diabetic retinal (eye) exam? No  Have you had your routine dental cleaning in the past 6 months? no    Have you activated your [employer] account? If not, what are your barriers?  Yes     Patient Care Team:  Linda Lugo MD as PCP - Bk James MD as PCP - [facility] Attributed Provider  Sanjiv Linda DO as Consulting Physician (Obstetrics & Gynecology)  Romulo Palacios MD as Consulting Physician (Nephrology)         Medical History Review  Past Medical, Family, and Social History reviewed and does contribute to the patient presenting condition  Chief Complaint   Patient presents with    Hypertension     management     Chronic Kidney Disease     Cr 1.49 sees Dr Taz Mireles for the kidneys     Congestive Heart Failure     le edema - sob with minmal exertion     Health Maintenance   Topic Date Due    DTaP/Tdap/Td vaccine (1 - Tdap) 09/29/1946    Zostavax vaccine  Addressed    Flu vaccine  Completed    Pneumococcal high/highest risk  Completed

## 2017-12-11 ENCOUNTER — TELEPHONE (OUTPATIENT)
Dept: INTERNAL MEDICINE CLINIC | Age: 82
End: 2017-12-11

## 2017-12-11 RX ORDER — SERTRALINE HYDROCHLORIDE 100 MG/1
TABLET, FILM COATED ORAL
Qty: 30 TABLET | Refills: 8 | Status: SHIPPED | OUTPATIENT
Start: 2017-12-11 | End: 2018-08-24 | Stop reason: SDUPTHER

## 2017-12-11 NOTE — TELEPHONE ENCOUNTER
Please increase Bumex to 1 mg twice a day for 3-4 days until her symptoms improve.   If her symptom does not improve she should make appointment

## 2017-12-18 ENCOUNTER — HOSPITAL ENCOUNTER (OUTPATIENT)
Age: 82
Discharge: HOME | End: 2017-12-18
Payer: COMMERCIAL

## 2017-12-18 DIAGNOSIS — N18.4 CKD (CHRONIC KIDNEY DISEASE) STAGE 4, GFR 15-29 ML/MIN (HCC): Chronic | ICD-10-CM

## 2017-12-18 LAB
ANION GAP SERPL CALCULATED.3IONS-SCNC: 15 MMOL/L (ref 9–17)
BUN BLDV-MCNC: 29 MG/DL (ref 8–23)
BUN/CREAT BLD: ABNORMAL (ref 9–20)
CALCIUM SERPL-MCNC: 9.2 MG/DL (ref 8.6–10.4)
CHLORIDE BLD-SCNC: 97 MMOL/L (ref 98–107)
CO2: 29 MMOL/L (ref 20–31)
CREAT SERPL-MCNC: 1.76 MG/DL (ref 0.5–0.9)
CREATININE URINE: 124.5 MG/DL (ref 28–217)
GFR AFRICAN AMERICAN: 33 ML/MIN
GFR NON-AFRICAN AMERICAN: 27 ML/MIN
GFR SERPL CREATININE-BSD FRML MDRD: ABNORMAL ML/MIN/{1.73_M2}
GFR SERPL CREATININE-BSD FRML MDRD: ABNORMAL ML/MIN/{1.73_M2}
GLUCOSE BLD-MCNC: 92 MG/DL (ref 70–99)
POTASSIUM SERPL-SCNC: 4 MMOL/L (ref 3.7–5.3)
SODIUM BLD-SCNC: 141 MMOL/L (ref 135–144)
TOTAL PROTEIN, URINE: 9 MG/DL

## 2017-12-18 PROCEDURE — 84156 ASSAY OF PROTEIN URINE: CPT

## 2017-12-18 PROCEDURE — 82570 ASSAY OF URINE CREATININE: CPT

## 2017-12-18 PROCEDURE — 36415 COLL VENOUS BLD VENIPUNCTURE: CPT

## 2017-12-18 PROCEDURE — 80048 BASIC METABOLIC PNL TOTAL CA: CPT

## 2018-01-08 ENCOUNTER — OFFICE VISIT (OUTPATIENT)
Dept: INTERNAL MEDICINE CLINIC | Age: 83
End: 2018-01-08
Payer: COMMERCIAL

## 2018-01-08 VITALS
OXYGEN SATURATION: 98 % | BODY MASS INDEX: 34.85 KG/M2 | HEIGHT: 58 IN | DIASTOLIC BLOOD PRESSURE: 56 MMHG | WEIGHT: 166 LBS | HEART RATE: 92 BPM | SYSTOLIC BLOOD PRESSURE: 108 MMHG

## 2018-01-08 DIAGNOSIS — G47.30 SLEEP APNEA, UNSPECIFIED TYPE: Chronic | ICD-10-CM

## 2018-01-08 DIAGNOSIS — I10 ESSENTIAL HYPERTENSION: Chronic | ICD-10-CM

## 2018-01-08 DIAGNOSIS — F31.9 BIPOLAR 1 DISORDER, DEPRESSED (HCC): Primary | Chronic | ICD-10-CM

## 2018-01-08 DIAGNOSIS — E03.9 ACQUIRED HYPOTHYROIDISM: Chronic | ICD-10-CM

## 2018-01-08 DIAGNOSIS — N18.4 CKD (CHRONIC KIDNEY DISEASE) STAGE 4, GFR 15-29 ML/MIN (HCC): Chronic | ICD-10-CM

## 2018-01-08 DIAGNOSIS — I50.32 CHRONIC DIASTOLIC HEART FAILURE (HCC): Chronic | ICD-10-CM

## 2018-01-08 PROCEDURE — 99214 OFFICE O/P EST MOD 30 MIN: CPT | Performed by: INTERNAL MEDICINE

## 2018-01-08 RX ORDER — ATORVASTATIN CALCIUM 40 MG/1
40 TABLET, FILM COATED ORAL NIGHTLY
Qty: 30 TABLET | Refills: 3 | Status: SHIPPED | OUTPATIENT
Start: 2018-01-08 | End: 2018-06-22 | Stop reason: SDUPTHER

## 2018-01-08 RX ORDER — BUMETANIDE 1 MG/1
1 TABLET ORAL DAILY
Qty: 60 TABLET | Refills: 2 | Status: SHIPPED | OUTPATIENT
Start: 2018-01-08 | End: 2018-06-22 | Stop reason: SDUPTHER

## 2018-01-08 ASSESSMENT — ENCOUNTER SYMPTOMS
APNEA: 0
CHOKING: 0
EYE REDNESS: 0
CONSTIPATION: 0
SHORTNESS OF BREATH: 1
EYE PAIN: 0
EYE DISCHARGE: 0
BACK PAIN: 0
COUGH: 0
ABDOMINAL DISTENTION: 0
BLOOD IN STOOL: 0
EYE ITCHING: 0
COLOR CHANGE: 0
ABDOMINAL PAIN: 0
DIARRHEA: 0
CHEST TIGHTNESS: 0

## 2018-01-08 ASSESSMENT — PATIENT HEALTH QUESTIONNAIRE - PHQ9
SUM OF ALL RESPONSES TO PHQ QUESTIONS 1-9: 0
1. LITTLE INTEREST OR PLEASURE IN DOING THINGS: 0
2. FEELING DOWN, DEPRESSED OR HOPELESS: 0
SUM OF ALL RESPONSES TO PHQ9 QUESTIONS 1 & 2: 0

## 2018-01-08 NOTE — PROGRESS NOTES
has gained 6 pounds since last visit. No other complaints    Review of Systems   Constitutional: Negative for activity change, appetite change, chills, diaphoresis, fatigue and fever. HENT: Positive for hearing loss. Negative for congestion, dental problem, drooling and ear discharge. Eyes: Negative for pain, discharge, redness and itching. Respiratory: Positive for shortness of breath. Negative for apnea, cough, choking and chest tightness. Cardiovascular: Positive for leg swelling. Negative for chest pain. Gastrointestinal: Negative for abdominal distention, abdominal pain, blood in stool, constipation and diarrhea. Endocrine: Negative for cold intolerance and heat intolerance. Genitourinary: Negative for difficulty urinating, dysuria, enuresis, flank pain and frequency. Musculoskeletal: Positive for gait problem. Negative for arthralgias, back pain and joint swelling. Skin: Negative for color change, pallor and rash. Neurological: Negative for dizziness, facial asymmetry, light-headedness, numbness and headaches. Psychiatric/Behavioral: Negative for agitation, behavioral problems, confusion, decreased concentration and dysphoric mood. Objective:   Physical Exam   Constitutional: She is oriented to person, place, and time. She appears well-developed. Wheel chair bound    HENT:   Head: Normocephalic and atraumatic. Mouth/Throat: No oropharyngeal exudate. Eyes: Conjunctivae are normal. Pupils are equal, round, and reactive to light. Right eye exhibits no discharge. Left eye exhibits no discharge. No scleral icterus. Neck: Normal range of motion. Neck supple. No JVD present. No tracheal deviation present. No thyromegaly present. Cardiovascular: Normal rate and normal heart sounds. Exam reveals no gallop. No murmur heard. Pulmonary/Chest: Breath sounds normal. No stridor. No respiratory distress. She has no wheezes. She has no rales. She exhibits no tenderness. Abdominal: Soft. Bowel sounds are normal. She exhibits no distension. There is no tenderness. There is no rebound and no guarding. Musculoskeletal: Normal range of motion. She exhibits edema. Neurological: She is alert and oriented to person, place, and time. Skin: She is not diaphoretic. Assessment:       1. Bipolar 1 disorder, depressed ([facility]    2. Chronic diastolic heart failure ([facility]    3. CKD (chronic kidney disease) stage 4, GFR 15-29 ml/min ([facility]    4. Essential hypertension    5. Sleep apnea, unspecified type    6. Acquired hypothyroidism            Plan:      1. Chronic diastolic heart failure (HCC)  Increase the dose of Bumex from 0.5 mg once a day to 1 mg once a day. Reinforced the importance of salt and fluid restriction  Discussed in length with patient that increasing diuretics can upset her kidneys, we will repeat BMP in a couple of weeks. - 2. Bipolar 1 disorder, depressed ([facility]- stable       3. CKD (chronic kidney disease) stage 4, GFR 15-29 ml/min ([facility])    1. Chronic diastolic heart failure (HCC)  - bumetanide (BUMEX) 1 MG tablet; Take 1 tablet by mouth daily  Dispense: 60 tablet; Refill: 2  - atorvastatin (LIPITOR) 40 MG tablet; Take 1 tablet by mouth nightly  Dispense: 30 tablet; Refill: 3    2. Bipolar 1 disorder, depressed ([facility]- Controlled     3. CKD (chronic kidney disease) stage 4, GFR 15-29 ml/min ([facility])  - Basic Metabolic Panel; Future    4. Essential hypertension- Controlled     5. Sleep apnea, unspecified type    6. Acquired hypothyroidism  - TSH; Future      · Return in about 6 weeks (around 2/19/2018). · [place] received counseling on the following healthy behaviors: nutrition, exercise and medication adherence    · Reviewed prior labs and health maintenance. · Discussed use, benefit, and side effects of prescribed medications. Barriers to medication compliance addressed. All patient questions answered. Pt voiced understanding.      · Patient given educational materials - see patient instructions    MD STANISLAV Bel[facility]anWashington [facility]  1/8/2018, 1:43 PM

## 2018-01-23 ENCOUNTER — OFFICE VISIT (OUTPATIENT)
Dept: INTERNAL MEDICINE CLINIC | Age: 83
End: 2018-01-23
Payer: COMMERCIAL

## 2018-01-23 VITALS
SYSTOLIC BLOOD PRESSURE: 122 MMHG | OXYGEN SATURATION: 96 % | WEIGHT: 162 LBS | BODY MASS INDEX: 34 KG/M2 | HEIGHT: 58 IN | DIASTOLIC BLOOD PRESSURE: 60 MMHG | HEART RATE: 82 BPM

## 2018-01-23 DIAGNOSIS — L30.4 INTERTRIGO: Primary | ICD-10-CM

## 2018-01-23 DIAGNOSIS — N18.4 CKD (CHRONIC KIDNEY DISEASE) STAGE 4, GFR 15-29 ML/MIN (HCC): Primary | Chronic | ICD-10-CM

## 2018-01-23 DIAGNOSIS — L02.92 BOIL: ICD-10-CM

## 2018-01-23 DIAGNOSIS — K13.0 INTERTRIGO LABIALIS: ICD-10-CM

## 2018-01-23 PROCEDURE — 99214 OFFICE O/P EST MOD 30 MIN: CPT | Performed by: INTERNAL MEDICINE

## 2018-01-23 RX ORDER — DOXYCYCLINE HYCLATE 100 MG
100 TABLET ORAL 2 TIMES DAILY
Qty: 20 TABLET | Refills: 0 | Status: SHIPPED | OUTPATIENT
Start: 2018-01-23 | End: 2018-02-02

## 2018-01-23 RX ORDER — NYSTATIN 100000 [USP'U]/G
POWDER TOPICAL
Qty: 1 BOTTLE | Refills: 1 | Status: SHIPPED | OUTPATIENT
Start: 2018-01-23 | End: 2018-08-30 | Stop reason: ALTCHOICE

## 2018-01-23 NOTE — PROGRESS NOTES
rales. She exhibits no tenderness. Abdominal: Soft. Bowel sounds are normal. She exhibits no distension. There is no tenderness. There is no rebound and no guarding. Genitourinary:   Genitourinary Comments: 2 small boil present in left labia majora  Rash present in the perineal area   Musculoskeletal: Normal range of motion. She exhibits no edema. Neurological: She is alert and oriented to person, place, and time. Skin: She is not diaphoretic. Assessment:       1. CKD (chronic kidney disease) stage 4, GFR 15-29 ml/min ([facility])    2. Boil    3. Intertrigo labialis              Plan:     1. CKD (chronic kidney disease) stage 4, GFR 15-29 ml/min (Former[facility])       Patient goals of Bumex was increased last visit, she has a order for BMP, advised to get her BMP done    2. Boil  - doxycycline hyclate (VIBRA-TABS) 100 MG tablet; Take 1 tablet by mouth 2 times daily for 10 days  Dispense: 20 tablet; Refill: 0    3. Intertrigo labialis- Order nystatin powder      · Return in about 2 weeks (around 2/6/2018). · [place] received counseling on the following healthy behaviors: nutrition, exercise and medication adherence    · Reviewed prior labs and health maintenance. · Discussed use, benefit, and side effects of prescribed medications. Barriers to medication compliance addressed. All patient questions answered. Pt voiced understanding.        MD STANISLAV [facility]  1/24/2018, 1:54 PM

## 2018-01-24 ASSESSMENT — ENCOUNTER SYMPTOMS
SHORTNESS OF BREATH: 0
DIARRHEA: 0
CHOKING: 0
CONSTIPATION: 0
BACK PAIN: 0
CHEST TIGHTNESS: 0
EYE ITCHING: 0
COLOR CHANGE: 0
ABDOMINAL DISTENTION: 0
APNEA: 0
COUGH: 0
EYE REDNESS: 0
EYE PAIN: 0
BLOOD IN STOOL: 0
ABDOMINAL PAIN: 0
EYE DISCHARGE: 0

## 2018-02-26 ENCOUNTER — OFFICE VISIT (OUTPATIENT)
Dept: INTERNAL MEDICINE CLINIC | Age: 83
End: 2018-02-26
Payer: COMMERCIAL

## 2018-02-26 ENCOUNTER — HOSPITAL ENCOUNTER (OUTPATIENT)
Age: 83
Setting detail: SPECIMEN
Discharge: HOME | End: 2018-02-26
Payer: COMMERCIAL

## 2018-02-26 VITALS
DIASTOLIC BLOOD PRESSURE: 76 MMHG | OXYGEN SATURATION: 97 % | HEIGHT: 58 IN | BODY MASS INDEX: 33.8 KG/M2 | HEART RATE: 82 BPM | WEIGHT: 161 LBS | SYSTOLIC BLOOD PRESSURE: 130 MMHG

## 2018-02-26 DIAGNOSIS — N18.4 CKD (CHRONIC KIDNEY DISEASE) STAGE 4, GFR 15-29 ML/MIN (HCC): Chronic | ICD-10-CM

## 2018-02-26 DIAGNOSIS — I10 ESSENTIAL HYPERTENSION: Chronic | ICD-10-CM

## 2018-02-26 DIAGNOSIS — R05.3 CHRONIC COUGH: ICD-10-CM

## 2018-02-26 DIAGNOSIS — K13.0 INTERTRIGO LABIALIS: Primary | ICD-10-CM

## 2018-02-26 DIAGNOSIS — G47.30 SLEEP APNEA, UNSPECIFIED TYPE: Chronic | ICD-10-CM

## 2018-02-26 LAB
ANION GAP SERPL CALCULATED.3IONS-SCNC: 15 MMOL/L (ref 9–17)
BUN BLDV-MCNC: 24 MG/DL (ref 8–23)
BUN/CREAT BLD: ABNORMAL (ref 9–20)
CALCIUM SERPL-MCNC: 9.2 MG/DL (ref 8.6–10.4)
CHLORIDE BLD-SCNC: 99 MMOL/L (ref 98–107)
CO2: 30 MMOL/L (ref 20–31)
CREAT SERPL-MCNC: 1.86 MG/DL (ref 0.5–0.9)
CREATININE URINE: 48.8 MG/DL (ref 28–217)
GFR AFRICAN AMERICAN: 31 ML/MIN
GFR NON-AFRICAN AMERICAN: 25 ML/MIN
GFR SERPL CREATININE-BSD FRML MDRD: ABNORMAL ML/MIN/{1.73_M2}
GFR SERPL CREATININE-BSD FRML MDRD: ABNORMAL ML/MIN/{1.73_M2}
GLUCOSE BLD-MCNC: 89 MG/DL (ref 70–99)
POTASSIUM SERPL-SCNC: 4.1 MMOL/L (ref 3.7–5.3)
SODIUM BLD-SCNC: 144 MMOL/L (ref 135–144)
TOTAL PROTEIN, URINE: 15 MG/DL

## 2018-02-26 PROCEDURE — 99214 OFFICE O/P EST MOD 30 MIN: CPT | Performed by: INTERNAL MEDICINE

## 2018-02-26 RX ORDER — BENZONATATE 100 MG/1
100 CAPSULE ORAL 3 TIMES DAILY PRN
Qty: 21 CAPSULE | Refills: 0 | Status: SHIPPED | OUTPATIENT
Start: 2018-02-26 | End: 2018-03-05

## 2018-02-26 RX ORDER — NYSTATIN 100000 U/G
CREAM TOPICAL
Qty: 1 G | Refills: 0 | Status: SHIPPED | OUTPATIENT
Start: 2018-02-26 | End: 2018-08-30 | Stop reason: ALTCHOICE

## 2018-02-26 ASSESSMENT — ENCOUNTER SYMPTOMS
BACK PAIN: 0
DIARRHEA: 0
ABDOMINAL PAIN: 0
COLOR CHANGE: 0
ABDOMINAL DISTENTION: 0
EYE REDNESS: 0
APNEA: 0
EYE ITCHING: 0
CHOKING: 0
EYE PAIN: 0
BLOOD IN STOOL: 0
EYE DISCHARGE: 0
CONSTIPATION: 0
CHEST TIGHTNESS: 0
SHORTNESS OF BREATH: 1
COUGH: 0

## 2018-02-26 NOTE — PROGRESS NOTES
her swelling in legs is getting worse. She follows with a nephrologist has appointment with Nickey Angelucci coming soon. Her weight is stable    Review of Systems   Constitutional: Negative for activity change, appetite change, chills, diaphoresis, fatigue and fever. HENT: Negative for congestion, dental problem, drooling and ear discharge. Eyes: Negative for pain, discharge, redness and itching. Respiratory: Positive for shortness of breath. Negative for apnea, cough, choking and chest tightness. Cardiovascular: Positive for leg swelling. Negative for chest pain. Gastrointestinal: Negative for abdominal distention, abdominal pain, blood in stool, constipation and diarrhea. Endocrine: Negative for cold intolerance and heat intolerance. Genitourinary: Negative for difficulty urinating, dysuria, enuresis, flank pain and frequency. Musculoskeletal: Positive for arthralgias. Negative for back pain, gait problem and joint swelling. Skin: Positive for rash (in Genital area ). Negative for color change and pallor. Neurological: Negative for dizziness, facial asymmetry, light-headedness, numbness and headaches. Psychiatric/Behavioral: Negative for agitation, behavioral problems, confusion, decreased concentration and dysphoric mood. Objective:   Physical Exam   Constitutional: She is oriented to person, place, and time. She appears well-developed and well-nourished. HENT:   Head: Normocephalic and atraumatic. Mouth/Throat: No oropharyngeal exudate. Eyes: Conjunctivae are normal. Pupils are equal, round, and reactive to light. Right eye exhibits no discharge. Left eye exhibits no discharge. No scleral icterus. Neck: Normal range of motion. Neck supple. No JVD present. No tracheal deviation present. No thyromegaly present. Cardiovascular: Normal rate and normal heart sounds. Exam reveals no gallop. No murmur heard. Pulmonary/Chest: Effort normal and breath sounds normal. No stridor.  No

## 2018-04-09 RX ORDER — BUMETANIDE 2 MG/1
2 TABLET ORAL DAILY
Qty: 30 TABLET | Refills: 3 | Status: SHIPPED | OUTPATIENT
Start: 2018-04-09 | End: 2018-07-18 | Stop reason: SDUPTHER

## 2018-04-12 ENCOUNTER — HOSPITAL ENCOUNTER (OUTPATIENT)
Age: 83
Setting detail: SPECIMEN
Discharge: HOME | End: 2018-04-12
Payer: COMMERCIAL

## 2018-04-12 DIAGNOSIS — N18.4 CKD (CHRONIC KIDNEY DISEASE) STAGE 4, GFR 15-29 ML/MIN (HCC): Chronic | ICD-10-CM

## 2018-04-12 DIAGNOSIS — Z79.899 MEDICATION DOSE CHANGED: ICD-10-CM

## 2018-04-12 LAB
ANION GAP SERPL CALCULATED.3IONS-SCNC: 18 MMOL/L (ref 9–17)
BUN BLDV-MCNC: 23 MG/DL (ref 8–23)
BUN/CREAT BLD: ABNORMAL (ref 9–20)
CALCIUM SERPL-MCNC: 9.6 MG/DL (ref 8.6–10.4)
CHLORIDE BLD-SCNC: 96 MMOL/L (ref 98–107)
CO2: 28 MMOL/L (ref 20–31)
CREAT SERPL-MCNC: 1.82 MG/DL (ref 0.5–0.9)
GFR AFRICAN AMERICAN: 32 ML/MIN
GFR NON-AFRICAN AMERICAN: 26 ML/MIN
GFR SERPL CREATININE-BSD FRML MDRD: ABNORMAL ML/MIN/{1.73_M2}
GFR SERPL CREATININE-BSD FRML MDRD: ABNORMAL ML/MIN/{1.73_M2}
GLUCOSE BLD-MCNC: 89 MG/DL (ref 70–99)
POTASSIUM SERPL-SCNC: 4.3 MMOL/L (ref 3.7–5.3)
SODIUM BLD-SCNC: 142 MMOL/L (ref 135–144)

## 2018-04-20 ENCOUNTER — HOSPITAL ENCOUNTER (EMERGENCY)
Age: 83
Discharge: HOME | End: 2018-04-20
Attending: EMERGENCY MEDICINE
Payer: COMMERCIAL

## 2018-04-20 ENCOUNTER — APPOINTMENT (OUTPATIENT)
Dept: GENERAL RADIOLOGY | Age: 83
End: 2018-04-20
Payer: COMMERCIAL

## 2018-04-20 VITALS
RESPIRATION RATE: 16 BRPM | OXYGEN SATURATION: 95 % | TEMPERATURE: 98 F | DIASTOLIC BLOOD PRESSURE: 47 MMHG | BODY MASS INDEX: 33.12 KG/M2 | HEART RATE: 73 BPM | WEIGHT: 164 LBS | SYSTOLIC BLOOD PRESSURE: 115 MMHG

## 2018-04-20 DIAGNOSIS — M79.89 LEG SWELLING: Primary | ICD-10-CM

## 2018-04-20 LAB
ABSOLUTE EOS #: 0.4 K/UL (ref 0–0.4)
ABSOLUTE IMMATURE GRANULOCYTE: ABNORMAL K/UL (ref 0–0.3)
ABSOLUTE LYMPH #: 1.2 K/UL (ref 1–4.8)
ABSOLUTE MONO #: 0.7 K/UL (ref 0.1–1.3)
ANION GAP SERPL CALCULATED.3IONS-SCNC: 11 MMOL/L (ref 9–17)
BASOPHILS # BLD: 1 % (ref 0–2)
BASOPHILS ABSOLUTE: 0.1 K/UL (ref 0–0.2)
BNP INTERPRETATION: ABNORMAL
BUN BLDV-MCNC: 20 MG/DL (ref 8–23)
BUN/CREAT BLD: ABNORMAL (ref 9–20)
CALCIUM SERPL-MCNC: 9 MG/DL (ref 8.6–10.4)
CHLORIDE BLD-SCNC: 97 MMOL/L (ref 98–107)
CO2: 30 MMOL/L (ref 20–31)
CREAT SERPL-MCNC: 1.4 MG/DL (ref 0.5–0.9)
DIFFERENTIAL TYPE: ABNORMAL
EKG ATRIAL RATE: 68 BPM
EKG P AXIS: 49 DEGREES
EKG P-R INTERVAL: 142 MS
EKG Q-T INTERVAL: 460 MS
EKG QRS DURATION: 84 MS
EKG QTC CALCULATION (BAZETT): 489 MS
EKG R AXIS: 12 DEGREES
EKG T AXIS: 39 DEGREES
EKG VENTRICULAR RATE: 68 BPM
EOSINOPHILS RELATIVE PERCENT: 7 % (ref 0–4)
GFR AFRICAN AMERICAN: 43 ML/MIN
GFR NON-AFRICAN AMERICAN: 35 ML/MIN
GFR SERPL CREATININE-BSD FRML MDRD: ABNORMAL ML/MIN/{1.73_M2}
GFR SERPL CREATININE-BSD FRML MDRD: ABNORMAL ML/MIN/{1.73_M2}
GLUCOSE BLD-MCNC: 81 MG/DL (ref 70–99)
HCT VFR BLD CALC: 36.7 % (ref 36–46)
HEMOGLOBIN: 12.4 G/DL (ref 12–16)
IMMATURE GRANULOCYTES: ABNORMAL %
LYMPHOCYTES # BLD: 22 % (ref 24–44)
MCH RBC QN AUTO: 31.4 PG (ref 26–34)
MCHC RBC AUTO-ENTMCNC: 33.7 G/DL (ref 31–37)
MCV RBC AUTO: 93.3 FL (ref 80–100)
MONOCYTES # BLD: 12 % (ref 1–7)
NRBC AUTOMATED: ABNORMAL PER 100 WBC
PDW BLD-RTO: 14.7 % (ref 11.5–14.9)
PLATELET # BLD: 205 K/UL (ref 150–450)
PLATELET ESTIMATE: ABNORMAL
PMV BLD AUTO: 8.4 FL (ref 6–12)
POTASSIUM SERPL-SCNC: 3.7 MMOL/L (ref 3.7–5.3)
PRO-BNP: 466 PG/ML
RBC # BLD: 3.93 M/UL (ref 4–5.2)
RBC # BLD: ABNORMAL 10*6/UL
SEG NEUTROPHILS: 58 % (ref 36–66)
SEGMENTED NEUTROPHILS ABSOLUTE COUNT: 3.3 K/UL (ref 1.3–9.1)
SODIUM BLD-SCNC: 138 MMOL/L (ref 135–144)
TROPONIN INTERP: NORMAL
TROPONIN INTERP: NORMAL
TROPONIN T: <0.03 NG/ML
TROPONIN T: <0.03 NG/ML
WBC # BLD: 5.6 K/UL (ref 3.5–11)
WBC # BLD: ABNORMAL 10*3/UL

## 2018-04-20 PROCEDURE — 83880 ASSAY OF NATRIURETIC PEPTIDE: CPT

## 2018-04-20 PROCEDURE — 84484 ASSAY OF TROPONIN QUANT: CPT

## 2018-04-20 PROCEDURE — 93005 ELECTROCARDIOGRAM TRACING: CPT

## 2018-04-20 PROCEDURE — 80048 BASIC METABOLIC PNL TOTAL CA: CPT

## 2018-04-20 PROCEDURE — 36415 COLL VENOUS BLD VENIPUNCTURE: CPT

## 2018-04-20 PROCEDURE — 85025 COMPLETE CBC W/AUTO DIFF WBC: CPT

## 2018-04-20 PROCEDURE — 99285 EMERGENCY DEPT VISIT HI MDM: CPT

## 2018-04-20 PROCEDURE — 71046 X-RAY EXAM CHEST 2 VIEWS: CPT

## 2018-04-21 ASSESSMENT — ENCOUNTER SYMPTOMS
SHORTNESS OF BREATH: 1
SORE THROAT: 0
VOMITING: 0
NAUSEA: 0
DIARRHEA: 0
ABDOMINAL PAIN: 0
WHEEZING: 0
CONSTIPATION: 0
COUGH: 0
BLURRED VISION: 0
EYE DISCHARGE: 0

## 2018-04-27 ENCOUNTER — OFFICE VISIT (OUTPATIENT)
Dept: INTERNAL MEDICINE CLINIC | Age: 83
End: 2018-04-27
Payer: COMMERCIAL

## 2018-04-27 VITALS
BODY MASS INDEX: 32.86 KG/M2 | WEIGHT: 163 LBS | SYSTOLIC BLOOD PRESSURE: 119 MMHG | DIASTOLIC BLOOD PRESSURE: 62 MMHG | HEART RATE: 71 BPM | HEIGHT: 59 IN

## 2018-04-27 DIAGNOSIS — I50.32 CHRONIC DIASTOLIC HEART FAILURE (HCC): Chronic | ICD-10-CM

## 2018-04-27 DIAGNOSIS — J39.2 THROAT IRRITATION: ICD-10-CM

## 2018-04-27 DIAGNOSIS — R13.19 ESOPHAGEAL DYSPHAGIA: ICD-10-CM

## 2018-04-27 DIAGNOSIS — E03.9 ACQUIRED HYPOTHYROIDISM: Chronic | ICD-10-CM

## 2018-04-27 DIAGNOSIS — I10 ESSENTIAL HYPERTENSION: Chronic | ICD-10-CM

## 2018-04-27 DIAGNOSIS — N18.4 CKD (CHRONIC KIDNEY DISEASE) STAGE 4, GFR 15-29 ML/MIN (HCC): Primary | Chronic | ICD-10-CM

## 2018-04-27 PROCEDURE — 99214 OFFICE O/P EST MOD 30 MIN: CPT | Performed by: INTERNAL MEDICINE

## 2018-04-30 ENCOUNTER — TELEPHONE (OUTPATIENT)
Dept: INTERNAL MEDICINE CLINIC | Age: 83
End: 2018-04-30

## 2018-04-30 DIAGNOSIS — R13.19 ESOPHAGEAL DYSPHAGIA: Primary | ICD-10-CM

## 2018-04-30 ASSESSMENT — ENCOUNTER SYMPTOMS
EYE REDNESS: 0
CHOKING: 0
ABDOMINAL PAIN: 0
COLOR CHANGE: 0
EYE PAIN: 0
APNEA: 0
SHORTNESS OF BREATH: 1
COUGH: 0
EYE DISCHARGE: 0
BLOOD IN STOOL: 0
BACK PAIN: 0
TROUBLE SWALLOWING: 1
ABDOMINAL DISTENTION: 0
CHEST TIGHTNESS: 0
EYE ITCHING: 0
CONSTIPATION: 0
DIARRHEA: 0

## 2018-05-02 ENCOUNTER — HOSPITAL ENCOUNTER (OUTPATIENT)
Dept: GENERAL RADIOLOGY | Age: 83
Discharge: HOME | End: 2018-05-04
Payer: COMMERCIAL

## 2018-05-02 DIAGNOSIS — R13.19 ESOPHAGEAL DYSPHAGIA: ICD-10-CM

## 2018-05-02 PROCEDURE — 2500000003 HC RX 250 WO HCPCS: Performed by: INTERNAL MEDICINE

## 2018-05-02 PROCEDURE — 74220 X-RAY XM ESOPHAGUS 1CNTRST: CPT

## 2018-05-02 RX ADMIN — BARIUM SULFATE 140 ML: 980 POWDER, FOR SUSPENSION ORAL at 10:33

## 2018-05-02 RX ADMIN — BARIUM SULFATE 160 ML: 960 POWDER, FOR SUSPENSION ORAL at 10:33

## 2018-05-25 ENCOUNTER — OFFICE VISIT (OUTPATIENT)
Dept: INTERNAL MEDICINE CLINIC | Age: 83
End: 2018-05-25
Payer: COMMERCIAL

## 2018-05-25 VITALS
SYSTOLIC BLOOD PRESSURE: 112 MMHG | HEIGHT: 59 IN | BODY MASS INDEX: 33.26 KG/M2 | OXYGEN SATURATION: 94 % | WEIGHT: 165 LBS | HEART RATE: 83 BPM | DIASTOLIC BLOOD PRESSURE: 50 MMHG

## 2018-05-25 DIAGNOSIS — N18.4 CKD (CHRONIC KIDNEY DISEASE) STAGE 4, GFR 15-29 ML/MIN (HCC): Chronic | ICD-10-CM

## 2018-05-25 DIAGNOSIS — K21.00 GASTROESOPHAGEAL REFLUX DISEASE WITH ESOPHAGITIS: Primary | ICD-10-CM

## 2018-05-25 PROCEDURE — 99213 OFFICE O/P EST LOW 20 MIN: CPT | Performed by: INTERNAL MEDICINE

## 2018-05-25 RX ORDER — FAMOTIDINE 20 MG/1
20 TABLET, FILM COATED ORAL 2 TIMES DAILY
Qty: 60 TABLET | Refills: 3 | Status: SHIPPED | OUTPATIENT
Start: 2018-05-25 | End: 2018-08-24 | Stop reason: SDUPTHER

## 2018-06-04 ASSESSMENT — ENCOUNTER SYMPTOMS
EYE ITCHING: 0
EYE PAIN: 0
EYE REDNESS: 0
EYE DISCHARGE: 0
SHORTNESS OF BREATH: 0
ABDOMINAL DISTENTION: 0
CHOKING: 0
ABDOMINAL PAIN: 1
CHEST TIGHTNESS: 0
APNEA: 0
CONSTIPATION: 0
BACK PAIN: 0
COUGH: 0
BLOOD IN STOOL: 0
DIARRHEA: 0
COLOR CHANGE: 0

## 2018-06-22 DIAGNOSIS — I50.32 CHRONIC DIASTOLIC HEART FAILURE (HCC): Chronic | ICD-10-CM

## 2018-06-22 RX ORDER — BUMETANIDE 1 MG/1
1 TABLET ORAL DAILY
Qty: 60 TABLET | Refills: 2 | Status: SHIPPED | OUTPATIENT
Start: 2018-06-22 | End: 2018-10-24 | Stop reason: SDUPTHER

## 2018-06-22 RX ORDER — ATORVASTATIN CALCIUM 40 MG/1
40 TABLET, FILM COATED ORAL NIGHTLY
Qty: 30 TABLET | Refills: 3 | Status: SHIPPED | OUTPATIENT
Start: 2018-06-22 | End: 2018-07-08 | Stop reason: SDUPTHER

## 2018-06-25 ENCOUNTER — OFFICE VISIT (OUTPATIENT)
Dept: INTERNAL MEDICINE CLINIC | Age: 83
End: 2018-06-25
Payer: COMMERCIAL

## 2018-06-25 VITALS
SYSTOLIC BLOOD PRESSURE: 110 MMHG | HEIGHT: 58 IN | WEIGHT: 165 LBS | BODY MASS INDEX: 34.63 KG/M2 | HEART RATE: 78 BPM | DIASTOLIC BLOOD PRESSURE: 50 MMHG

## 2018-06-25 DIAGNOSIS — N18.4 CKD (CHRONIC KIDNEY DISEASE) STAGE 4, GFR 15-29 ML/MIN (HCC): Primary | Chronic | ICD-10-CM

## 2018-06-25 DIAGNOSIS — I50.32 CHRONIC DIASTOLIC HEART FAILURE (HCC): Chronic | ICD-10-CM

## 2018-06-25 DIAGNOSIS — R13.19 ESOPHAGEAL DYSPHAGIA: ICD-10-CM

## 2018-06-25 PROCEDURE — G0444 DEPRESSION SCREEN ANNUAL: HCPCS | Performed by: INTERNAL MEDICINE

## 2018-06-25 PROCEDURE — 99214 OFFICE O/P EST MOD 30 MIN: CPT | Performed by: INTERNAL MEDICINE

## 2018-06-25 RX ORDER — LOSARTAN POTASSIUM 25 MG/1
12.5 TABLET ORAL DAILY
Qty: 45 TABLET | Refills: 3 | Status: SHIPPED | OUTPATIENT
Start: 2018-06-25 | End: 2018-07-17 | Stop reason: ALTCHOICE

## 2018-06-25 RX ORDER — OMEPRAZOLE 20 MG/1
CAPSULE, DELAYED RELEASE ORAL
COMMUNITY
End: 2018-06-25 | Stop reason: ALTCHOICE

## 2018-06-25 ASSESSMENT — ENCOUNTER SYMPTOMS
CONSTIPATION: 0
TROUBLE SWALLOWING: 1
DIARRHEA: 0
EYE ITCHING: 0
BLOOD IN STOOL: 0
EYE DISCHARGE: 0
ABDOMINAL DISTENTION: 0
CHEST TIGHTNESS: 0
BACK PAIN: 0
EYE PAIN: 0
COLOR CHANGE: 0
SHORTNESS OF BREATH: 0
EYE REDNESS: 0
APNEA: 0
CHOKING: 0
ABDOMINAL PAIN: 0
COUGH: 0

## 2018-06-25 ASSESSMENT — PATIENT HEALTH QUESTIONNAIRE - PHQ9
1. LITTLE INTEREST OR PLEASURE IN DOING THINGS: 3
6. FEELING BAD ABOUT YOURSELF - OR THAT YOU ARE A FAILURE OR HAVE LET YOURSELF OR YOUR FAMILY DOWN: 2
7. TROUBLE CONCENTRATING ON THINGS, SUCH AS READING THE NEWSPAPER OR WATCHING TELEVISION: 1
4. FEELING TIRED OR HAVING LITTLE ENERGY: 3
9. THOUGHTS THAT YOU WOULD BE BETTER OFF DEAD, OR OF HURTING YOURSELF: 0
10. IF YOU CHECKED OFF ANY PROBLEMS, HOW DIFFICULT HAVE THESE PROBLEMS MADE IT FOR YOU TO DO YOUR WORK, TAKE CARE OF THINGS AT HOME, OR GET ALONG WITH OTHER PEOPLE: 1
3. TROUBLE FALLING OR STAYING ASLEEP: 2
8. MOVING OR SPEAKING SO SLOWLY THAT OTHER PEOPLE COULD HAVE NOTICED. OR THE OPPOSITE, BEING SO FIGETY OR RESTLESS THAT YOU HAVE BEEN MOVING AROUND A LOT MORE THAN USUAL: 0
5. POOR APPETITE OR OVEREATING: 2
SUM OF ALL RESPONSES TO PHQ9 QUESTIONS 1 & 2: 5
2. FEELING DOWN, DEPRESSED OR HOPELESS: 2
SUM OF ALL RESPONSES TO PHQ QUESTIONS 1-9: 15

## 2018-07-08 DIAGNOSIS — I50.32 CHRONIC DIASTOLIC HEART FAILURE (HCC): Chronic | ICD-10-CM

## 2018-07-08 RX ORDER — ATORVASTATIN CALCIUM 40 MG/1
40 TABLET, FILM COATED ORAL NIGHTLY
Qty: 30 TABLET | Refills: 3 | Status: SHIPPED | OUTPATIENT
Start: 2018-07-08 | End: 2018-10-29 | Stop reason: SDUPTHER

## 2018-07-17 ENCOUNTER — PATIENT MESSAGE (OUTPATIENT)
Dept: INTERNAL MEDICINE CLINIC | Age: 83
End: 2018-07-17

## 2018-07-17 ENCOUNTER — OFFICE VISIT (OUTPATIENT)
Dept: INTERNAL MEDICINE CLINIC | Age: 83
End: 2018-07-17
Payer: COMMERCIAL

## 2018-07-17 VITALS
SYSTOLIC BLOOD PRESSURE: 100 MMHG | WEIGHT: 165 LBS | BODY MASS INDEX: 32.39 KG/M2 | DIASTOLIC BLOOD PRESSURE: 48 MMHG | HEIGHT: 60 IN | HEART RATE: 81 BPM

## 2018-07-17 DIAGNOSIS — R60.0 PEDAL EDEMA: ICD-10-CM

## 2018-07-17 DIAGNOSIS — R05.3 CHRONIC COUGH: ICD-10-CM

## 2018-07-17 DIAGNOSIS — E78.5 HYPERLIPIDEMIA, UNSPECIFIED HYPERLIPIDEMIA TYPE: ICD-10-CM

## 2018-07-17 DIAGNOSIS — R42 ORTHOSTATIC DIZZINESS: ICD-10-CM

## 2018-07-17 DIAGNOSIS — E03.9 HYPOTHYROIDISM, UNSPECIFIED TYPE: ICD-10-CM

## 2018-07-17 DIAGNOSIS — M54.2 NECK PAIN: Primary | ICD-10-CM

## 2018-07-17 PROCEDURE — 99214 OFFICE O/P EST MOD 30 MIN: CPT | Performed by: INTERNAL MEDICINE

## 2018-07-17 RX ORDER — BENZONATATE 100 MG/1
100 CAPSULE ORAL 3 TIMES DAILY PRN
Qty: 30 CAPSULE | Refills: 0 | Status: SHIPPED | OUTPATIENT
Start: 2018-07-17 | End: 2018-07-27

## 2018-07-17 RX ORDER — TRAMADOL HYDROCHLORIDE 50 MG/1
25 TABLET ORAL EVERY 6 HOURS PRN
Qty: 10 TABLET | Refills: 0 | Status: SHIPPED | OUTPATIENT
Start: 2018-07-17 | End: 2018-07-22

## 2018-07-17 ASSESSMENT — PATIENT HEALTH QUESTIONNAIRE - PHQ9
SUM OF ALL RESPONSES TO PHQ QUESTIONS 1-9: 2
1. LITTLE INTEREST OR PLEASURE IN DOING THINGS: 1
SUM OF ALL RESPONSES TO PHQ9 QUESTIONS 1 & 2: 2
2. FEELING DOWN, DEPRESSED OR HOPELESS: 1

## 2018-07-17 NOTE — PROGRESS NOTES
about 3 weeks (around 8/7/2018). · Reviewed prior labs and health maintenance. · Discussed use, benefit, and side effects of prescribed medications. Barriers to medication compliance addressed. All patient questions answered. Pt voiced understanding. Katy Martinez MD  [facility]  7/29/2018, 1:45 PM    Please note that this chart was generated using voice recognition Dragon dictation software. Although every effort was made to ensure the accuracy of this automated transcription, some errors in transcription may have occurred.

## 2018-07-18 RX ORDER — BUMETANIDE 2 MG/1
2 TABLET ORAL DAILY
Qty: 30 TABLET | Refills: 3 | Status: SHIPPED | OUTPATIENT
Start: 2018-07-18 | End: 2018-08-01 | Stop reason: SDUPTHER

## 2018-07-19 ENCOUNTER — TELEPHONE (OUTPATIENT)
Dept: INTERNAL MEDICINE CLINIC | Age: 83
End: 2018-07-19

## 2018-07-19 ENCOUNTER — HOSPITAL ENCOUNTER (OUTPATIENT)
Age: 83
Setting detail: SPECIMEN
Discharge: HOME | End: 2018-07-19
Payer: COMMERCIAL

## 2018-07-19 DIAGNOSIS — N39.0 URINARY TRACT INFECTION WITHOUT HEMATURIA, SITE UNSPECIFIED: Primary | ICD-10-CM

## 2018-07-19 DIAGNOSIS — N39.0 URINARY TRACT INFECTION WITHOUT HEMATURIA, SITE UNSPECIFIED: ICD-10-CM

## 2018-07-19 LAB
BILIRUBIN URINE: NEGATIVE
COLOR: ABNORMAL
COMMENT UA: ABNORMAL
GLUCOSE URINE: NEGATIVE
KETONES, URINE: ABNORMAL
LEUKOCYTE ESTERASE, URINE: NEGATIVE
NITRITE, URINE: NEGATIVE
PH UA: 5 (ref 5–8)
PROTEIN UA: NEGATIVE
SPECIFIC GRAVITY UA: 1.02 (ref 1–1.03)
TURBIDITY: CLEAR
URINE HGB: NEGATIVE
UROBILINOGEN, URINE: NORMAL

## 2018-07-19 NOTE — TELEPHONE ENCOUNTER
Daughter Eduardo Roth called on behalf of [place] asking if a urine could be checked on her for a UTI she is very forgetful and more confused since she has been seen in the office a couple days ago

## 2018-07-29 ASSESSMENT — ENCOUNTER SYMPTOMS
BLOOD IN STOOL: 0
APNEA: 0
CHEST TIGHTNESS: 0
EYE DISCHARGE: 0
BACK PAIN: 0
COLOR CHANGE: 0
EYE PAIN: 0
EYE REDNESS: 0
COUGH: 1
CONSTIPATION: 0
ABDOMINAL PAIN: 0
SHORTNESS OF BREATH: 0
ABDOMINAL DISTENTION: 0
EYE ITCHING: 0
DIARRHEA: 0
CHOKING: 0

## 2018-08-01 ENCOUNTER — HOSPITAL ENCOUNTER (OUTPATIENT)
Dept: GENERAL RADIOLOGY | Facility: CLINIC | Age: 83
Discharge: HOME | End: 2018-08-03
Payer: COMMERCIAL

## 2018-08-01 ENCOUNTER — HOSPITAL ENCOUNTER (OUTPATIENT)
Age: 83
Setting detail: SPECIMEN
Discharge: HOME | End: 2018-08-01
Payer: COMMERCIAL

## 2018-08-01 ENCOUNTER — HOSPITAL ENCOUNTER (OUTPATIENT)
Facility: CLINIC | Age: 83
Discharge: HOME | End: 2018-08-03
Payer: COMMERCIAL

## 2018-08-01 DIAGNOSIS — E03.9 HYPOTHYROIDISM, UNSPECIFIED TYPE: ICD-10-CM

## 2018-08-01 DIAGNOSIS — E78.5 HYPERLIPIDEMIA, UNSPECIFIED HYPERLIPIDEMIA TYPE: ICD-10-CM

## 2018-08-01 DIAGNOSIS — M54.2 NECK PAIN: ICD-10-CM

## 2018-08-01 DIAGNOSIS — N18.4 CKD (CHRONIC KIDNEY DISEASE) STAGE 4, GFR 15-29 ML/MIN (HCC): Chronic | ICD-10-CM

## 2018-08-01 LAB
ABSOLUTE EOS #: 0.39 K/UL (ref 0–0.44)
ABSOLUTE IMMATURE GRANULOCYTE: <0.03 K/UL (ref 0–0.3)
ABSOLUTE LYMPH #: 1.3 K/UL (ref 1.1–3.7)
ABSOLUTE MONO #: 0.54 K/UL (ref 0.1–1.2)
ANION GAP SERPL CALCULATED.3IONS-SCNC: 14 MMOL/L (ref 9–17)
BASOPHILS # BLD: 1 % (ref 0–2)
BASOPHILS ABSOLUTE: 0.07 K/UL (ref 0–0.2)
BUN BLDV-MCNC: 27 MG/DL (ref 8–23)
BUN/CREAT BLD: ABNORMAL (ref 9–20)
CALCIUM IONIZED: 1.21 MMOL/L (ref 1.13–1.33)
CALCIUM SERPL-MCNC: 9 MG/DL (ref 8.6–10.4)
CHLORIDE BLD-SCNC: 95 MMOL/L (ref 98–107)
CHOLESTEROL/HDL RATIO: 2.8
CHOLESTEROL: 138 MG/DL
CO2: 28 MMOL/L (ref 20–31)
CREAT SERPL-MCNC: 2.01 MG/DL (ref 0.5–0.9)
CREATININE URINE: 27.1 MG/DL (ref 28–217)
DIFFERENTIAL TYPE: ABNORMAL
EOSINOPHILS RELATIVE PERCENT: 8 % (ref 1–4)
GFR AFRICAN AMERICAN: 28 ML/MIN
GFR NON-AFRICAN AMERICAN: 23 ML/MIN
GFR SERPL CREATININE-BSD FRML MDRD: ABNORMAL ML/MIN/{1.73_M2}
GFR SERPL CREATININE-BSD FRML MDRD: ABNORMAL ML/MIN/{1.73_M2}
GLUCOSE BLD-MCNC: 86 MG/DL (ref 70–99)
HCT VFR BLD CALC: 38.3 % (ref 36.3–47.1)
HDLC SERPL-MCNC: 49 MG/DL
HEMOGLOBIN: 11.7 G/DL (ref 11.9–15.1)
IMMATURE GRANULOCYTES: 0 %
LDL CHOLESTEROL: 49 MG/DL (ref 0–130)
LYMPHOCYTES # BLD: 26 % (ref 24–43)
MCH RBC QN AUTO: 30.2 PG (ref 25.2–33.5)
MCHC RBC AUTO-ENTMCNC: 30.5 G/DL (ref 28.4–34.8)
MCV RBC AUTO: 99 FL (ref 82.6–102.9)
MONOCYTES # BLD: 11 % (ref 3–12)
NRBC AUTOMATED: 0 PER 100 WBC
PDW BLD-RTO: 14.2 % (ref 11.8–14.4)
PHOSPHORUS: 3.6 MG/DL (ref 2.6–4.5)
PLATELET # BLD: 230 K/UL (ref 138–453)
PLATELET ESTIMATE: ABNORMAL
PMV BLD AUTO: 10.8 FL (ref 8.1–13.5)
POTASSIUM SERPL-SCNC: 3.9 MMOL/L (ref 3.7–5.3)
PTH INTACT: 68.75 PG/ML (ref 15–65)
RBC # BLD: 3.87 M/UL (ref 3.95–5.11)
RBC # BLD: ABNORMAL 10*6/UL
SEG NEUTROPHILS: 54 % (ref 36–65)
SEGMENTED NEUTROPHILS ABSOLUTE COUNT: 2.77 K/UL (ref 1.5–8.1)
SODIUM BLD-SCNC: 137 MMOL/L (ref 135–144)
TOTAL PROTEIN, URINE: <4 MG/DL
TRIGL SERPL-MCNC: 198 MG/DL
TSH SERPL DL<=0.05 MIU/L-ACNC: 1.13 MIU/L (ref 0.3–5)
VITAMIN D 25-HYDROXY: 48.3 NG/ML (ref 30–100)
VLDLC SERPL CALC-MCNC: ABNORMAL MG/DL (ref 1–30)
WBC # BLD: 5.1 K/UL (ref 3.5–11.3)
WBC # BLD: ABNORMAL 10*3/UL

## 2018-08-01 PROCEDURE — 72040 X-RAY EXAM NECK SPINE 2-3 VW: CPT

## 2018-08-01 RX ORDER — BUMETANIDE 2 MG/1
2 TABLET ORAL DAILY
Qty: 30 TABLET | Refills: 3 | Status: SHIPPED | OUTPATIENT
Start: 2018-08-01 | End: 2018-12-10 | Stop reason: SDUPTHER

## 2018-08-03 ENCOUNTER — OFFICE VISIT (OUTPATIENT)
Dept: GASTROENTEROLOGY | Age: 83
End: 2018-08-03
Payer: COMMERCIAL

## 2018-08-03 VITALS
HEIGHT: 59 IN | WEIGHT: 167.4 LBS | SYSTOLIC BLOOD PRESSURE: 116 MMHG | HEART RATE: 95 BPM | DIASTOLIC BLOOD PRESSURE: 65 MMHG | BODY MASS INDEX: 33.75 KG/M2

## 2018-08-03 DIAGNOSIS — K59.00 CONSTIPATION, UNSPECIFIED CONSTIPATION TYPE: ICD-10-CM

## 2018-08-03 DIAGNOSIS — R13.19 ESOPHAGEAL DYSPHAGIA: Primary | ICD-10-CM

## 2018-08-03 DIAGNOSIS — R07.0 THROAT DISCOMFORT: ICD-10-CM

## 2018-08-03 PROCEDURE — 99204 OFFICE O/P NEW MOD 45 MIN: CPT | Performed by: INTERNAL MEDICINE

## 2018-08-03 ASSESSMENT — ENCOUNTER SYMPTOMS
BACK PAIN: 1
SHORTNESS OF BREATH: 1
EYES NEGATIVE: 1
CONSTIPATION: 1
TROUBLE SWALLOWING: 1

## 2018-08-03 NOTE — PROGRESS NOTES
motion. Neck supple. Cardiovascular: Normal heart sounds and intact distal pulses. Pulmonary/Chest: Effort normal and breath sounds normal.   Abdominal: Soft. Bowel sounds are normal.   NON TENDER, NON DISTENTED  LIVER SPLEEN AND HERNIAS ARE NOT  PALPABLE  BOWEL SOUNDS ARE POSITIVE      Musculoskeletal: Normal range of motion. Neurological: She is alert and oriented to person, place, and time. Skin: Skin is warm. Psychiatric: Her behavior is normal.       Assessment:      As above      Plan: Will get swallow evaluation     Will get EGD    MRI Neck      The Endoscopic procedure was explained to the patient in detail  The prep and NPO were explained  All the Risks, Benefits, and Alternatives were explained  Risk of Bleeding, Perforation and Cardio Respiratory risks were explained  her questions were answered  The procedure has been scheduled with the  in the office  Patient was asked to give us a call for any questions  The patient has verbalized understanding and agreement to this plan. Pt was asked to chew food well  Take time in eating  Sit up or prop up when eating  Don't talk when eating  Walk after finish eating  Use liquids with meals if has issues  The patient has verbalized understanding and agreemenet to this.

## 2018-08-06 ENCOUNTER — HOSPITAL ENCOUNTER (OUTPATIENT)
Age: 83
Setting detail: SPECIMEN
Discharge: HOME | End: 2018-08-06
Payer: COMMERCIAL

## 2018-08-06 DIAGNOSIS — N18.4 CKD (CHRONIC KIDNEY DISEASE) STAGE 4, GFR 15-29 ML/MIN (HCC): Chronic | ICD-10-CM

## 2018-08-06 DIAGNOSIS — I50.32 CHRONIC DIASTOLIC HEART FAILURE (HCC): Chronic | ICD-10-CM

## 2018-08-06 LAB
ANION GAP SERPL CALCULATED.3IONS-SCNC: 12 MMOL/L (ref 9–17)
BUN BLDV-MCNC: 19 MG/DL (ref 8–23)
BUN/CREAT BLD: ABNORMAL (ref 9–20)
CALCIUM SERPL-MCNC: 9 MG/DL (ref 8.6–10.4)
CHLORIDE BLD-SCNC: 95 MMOL/L (ref 98–107)
CO2: 28 MMOL/L (ref 20–31)
CREAT SERPL-MCNC: 1.78 MG/DL (ref 0.5–0.9)
GFR AFRICAN AMERICAN: 32 ML/MIN
GFR NON-AFRICAN AMERICAN: 27 ML/MIN
GFR SERPL CREATININE-BSD FRML MDRD: ABNORMAL ML/MIN/{1.73_M2}
GFR SERPL CREATININE-BSD FRML MDRD: ABNORMAL ML/MIN/{1.73_M2}
GLUCOSE BLD-MCNC: 85 MG/DL (ref 70–99)
POTASSIUM SERPL-SCNC: 4.2 MMOL/L (ref 3.7–5.3)
SODIUM BLD-SCNC: 135 MMOL/L (ref 135–144)

## 2018-08-07 ENCOUNTER — OFFICE VISIT (OUTPATIENT)
Dept: INTERNAL MEDICINE CLINIC | Age: 83
End: 2018-08-07
Payer: COMMERCIAL

## 2018-08-07 VITALS — HEIGHT: 59 IN | WEIGHT: 167 LBS | BODY MASS INDEX: 33.67 KG/M2

## 2018-08-07 DIAGNOSIS — N18.4 CKD (CHRONIC KIDNEY DISEASE) STAGE 4, GFR 15-29 ML/MIN (HCC): Chronic | ICD-10-CM

## 2018-08-07 DIAGNOSIS — L03.115 CELLULITIS OF RIGHT LOWER EXTREMITY: Primary | ICD-10-CM

## 2018-08-07 PROCEDURE — 99213 OFFICE O/P EST LOW 20 MIN: CPT | Performed by: INTERNAL MEDICINE

## 2018-08-07 RX ORDER — DOXYCYCLINE HYCLATE 100 MG
100 TABLET ORAL 2 TIMES DAILY
Qty: 20 TABLET | Refills: 0 | Status: SHIPPED | OUTPATIENT
Start: 2018-08-07 | End: 2018-08-17

## 2018-08-07 ASSESSMENT — ENCOUNTER SYMPTOMS
COLOR CHANGE: 0
EYE ITCHING: 0
ABDOMINAL DISTENTION: 0
COUGH: 0
CHEST TIGHTNESS: 0
CONSTIPATION: 0
BACK PAIN: 0
EYE DISCHARGE: 0
EYE REDNESS: 0
SHORTNESS OF BREATH: 0
EYE PAIN: 0
CHOKING: 0
BLOOD IN STOOL: 0
ABDOMINAL PAIN: 0
DIARRHEA: 0
APNEA: 0

## 2018-08-07 NOTE — PROGRESS NOTES
Subjective:      Patient ID: [place] is a 80 y.o. female. Visit Information    Have you changed or started any medications since your last visit including any over-the-counter medicines, vitamins, or herbal medicines? no   Are you having any side effects from any of your medications? -  no  Have you stopped taking any of your medications? Is so, why? -  no    Have you seen any other physician or provider since your last visit? No  Have you had any other diagnostic tests since your last visit? No  Have you been seen in the emergency room and/or had an admission to a hospital since we last saw you? No  Have you had your routine dental cleaning in the past 6 months? no    Have you activated your [employer] account? If not, what are your barriers? Yes     Patient Care Team:  Helen Marshall MD as PCP - Ca Cordero MD as PCP - [facility] Attributed Provider  Jessica Small DO as Consulting Physician (Obstetrics & Gynecology)  Tk Silva MD as Consulting Physician (Nephrology)  Pebbles Palomo MD as Consulting Physician (Gastroenterology)    Medical History Review  Past Medical, Family, and Social History reviewed and does contribute to the patient presenting condition    Health Maintenance   Topic Date Due    DTaP/Tdap/Td vaccine (1 - Tdap) [birth year]/1946    Shingles Vaccine (1 of 2 - 2 Dose Series) 09/29/1977    Flu vaccine (1) 09/01/2018    TSH testing  08/01/2019    Potassium monitoring  08/06/2019    Creatinine monitoring  08/06/2019    Pneumococcal high/highest risk  Completed     Chief Complaint   Patient presents with    Wound Check     has skin tear on lt shin red not detting better        HPI- patient is here for evaluation of her wound in right leg. Symptoms started 2 days back, when she had her right leg on a shopping cart. Redness, and swelling in the legs keep increased, she noticed excruciating pain at the local site, she had bleeding from right at the time of injury.   Did not

## 2018-08-13 ENCOUNTER — TELEPHONE (OUTPATIENT)
Dept: GASTROENTEROLOGY | Age: 83
End: 2018-08-13

## 2018-08-20 ENCOUNTER — TELEPHONE (OUTPATIENT)
Dept: INTERNAL MEDICINE CLINIC | Age: 83
End: 2018-08-20

## 2018-08-20 NOTE — TELEPHONE ENCOUNTER
Patient seen about a week ago for a skin tea and LE cellulitis. Finished 10 day course of atbx. Dtr states that her lower leg is still quite red, and warm to the touch today. They are able to come in this afternoon if you are willing to see them (your schedule is booked), or if you can call in additional atbx. She has a 2 week follow up appt with you on 8/27/18.

## 2018-08-21 ENCOUNTER — TELEPHONE (OUTPATIENT)
Dept: GASTROENTEROLOGY | Age: 83
End: 2018-08-21

## 2018-08-21 DIAGNOSIS — R13.19 ESOPHAGEAL DYSPHAGIA: Primary | ICD-10-CM

## 2018-08-21 DIAGNOSIS — N18.4 CKD (CHRONIC KIDNEY DISEASE) STAGE 4, GFR 15-29 ML/MIN (HCC): Chronic | ICD-10-CM

## 2018-08-21 NOTE — TELEPHONE ENCOUNTER
Horace at outpatient sched calling to ask for the MRI of neck order to be changed to without contrast, family stating that the patient cannot have contrast because of I believe she said a kidney disorder. They said if you could just change the order they will be able to schedule. If there is a problem please call Horace back at  [phone].

## 2018-08-22 ENCOUNTER — OFFICE VISIT (OUTPATIENT)
Dept: INTERNAL MEDICINE CLINIC | Age: 83
End: 2018-08-22
Payer: COMMERCIAL

## 2018-08-22 VITALS
SYSTOLIC BLOOD PRESSURE: 120 MMHG | HEART RATE: 51 BPM | HEIGHT: 59 IN | BODY MASS INDEX: 33.67 KG/M2 | WEIGHT: 167 LBS | DIASTOLIC BLOOD PRESSURE: 51 MMHG

## 2018-08-22 DIAGNOSIS — M54.2 ARTHRALGIA, CERVICAL SPINE: ICD-10-CM

## 2018-08-22 DIAGNOSIS — S81.801D WOUND OF RIGHT LOWER EXTREMITY, SUBSEQUENT ENCOUNTER: Primary | ICD-10-CM

## 2018-08-22 PROCEDURE — 99213 OFFICE O/P EST LOW 20 MIN: CPT | Performed by: INTERNAL MEDICINE

## 2018-08-22 ASSESSMENT — ENCOUNTER SYMPTOMS
BACK PAIN: 0
CHOKING: 0
ABDOMINAL PAIN: 0
CONSTIPATION: 0
CHEST TIGHTNESS: 0
SHORTNESS OF BREATH: 0
COLOR CHANGE: 0
ABDOMINAL DISTENTION: 0
DIARRHEA: 0
EYE ITCHING: 0
EYE DISCHARGE: 0
EYE REDNESS: 0
BLOOD IN STOOL: 0
COUGH: 0
EYE PAIN: 0
APNEA: 0

## 2018-08-22 NOTE — PROGRESS NOTES
concentration and dysphoric mood. Objective:   Physical Exam   Constitutional: She is oriented to person, place, and time. She appears well-developed and well-nourished. HENT:   Head: Normocephalic and atraumatic. Mouth/Throat: No oropharyngeal exudate. Eyes: Pupils are equal, round, and reactive to light. Conjunctivae are normal. Right eye exhibits no discharge. Left eye exhibits no discharge. No scleral icterus. Neck: Normal range of motion. Neck supple. No JVD present. No tracheal deviation present. No thyromegaly present. Cardiovascular: Normal rate and normal heart sounds. Exam reveals no gallop. No murmur heard. Pulmonary/Chest: Effort normal and breath sounds normal. No stridor. No respiratory distress. She has no wheezes. She has no rales. She exhibits no tenderness. Abdominal: Soft. Bowel sounds are normal. She exhibits no distension. There is no tenderness. There is no rebound and no guarding. Musculoskeletal: Normal range of motion. She exhibits edema (3 plus edema present ) and tenderness (both legs ). Wound around 3X3 cm in Shin of Right Leg , Sero-sanguinous drainage present    Neurological: She is alert and oriented to person, place, and time. Skin: She is not diaphoretic. Assessment / Plan:   1. Wound of right lower extremity, subsequent encounter  Given samples of Jadiel- derm   Explained in Length , if wound get worse , Should call her   Will  Consider Referral to Dr Keiry Arshad care , if symptoms does not improved   Patient chose to come to office for Dressing Change         · Return in about 10 days (around 9/1/2018). · Reviewed prior labs and health maintenance. · Discussed use, benefit, and side effects of prescribed medications. Barriers to medication compliance addressed. All patient questions answered. Pt voiced understanding.          MD STANISLAV [facility]  8/22/2018, 10:38 AM    Please note that this chart was generated using voice recognition Dragon dictation software. Although every effort was made to ensure the accuracy of this automated transcription, some errors in transcription may have occurred.

## 2018-08-24 ENCOUNTER — NURSE ONLY (OUTPATIENT)
Dept: INTERNAL MEDICINE CLINIC | Age: 83
End: 2018-08-24

## 2018-08-24 DIAGNOSIS — K21.00 GASTROESOPHAGEAL REFLUX DISEASE WITH ESOPHAGITIS: ICD-10-CM

## 2018-08-24 DIAGNOSIS — Z48.00 DRESSING CHANGE: Primary | ICD-10-CM

## 2018-08-24 NOTE — PROGRESS NOTES
Wound Care Documentation:     3x3 cm lower right leg dressing change. OpSite and DuoDerm were removed, excess sero-sanguinous  drainage lightly patted to visualize the wound. The tissue is still sloughing no new signs of infection. DuoDerm and OpSite were placed over the wound patient to return in 3 days for dressing change.

## 2018-08-25 RX ORDER — FAMOTIDINE 20 MG/1
20 TABLET, FILM COATED ORAL 2 TIMES DAILY
Qty: 60 TABLET | Refills: 8 | Status: SHIPPED | OUTPATIENT
Start: 2018-08-25 | End: 2018-09-14 | Stop reason: SDUPTHER

## 2018-08-25 RX ORDER — SERTRALINE HYDROCHLORIDE 100 MG/1
TABLET, FILM COATED ORAL
Qty: 30 TABLET | Refills: 8 | Status: SHIPPED | OUTPATIENT
Start: 2018-08-25 | End: 2018-09-14 | Stop reason: SDUPTHER

## 2018-08-27 ENCOUNTER — OFFICE VISIT (OUTPATIENT)
Dept: INTERNAL MEDICINE CLINIC | Age: 83
End: 2018-08-27
Payer: COMMERCIAL

## 2018-08-27 VITALS
SYSTOLIC BLOOD PRESSURE: 128 MMHG | DIASTOLIC BLOOD PRESSURE: 61 MMHG | RESPIRATION RATE: 14 BRPM | WEIGHT: 167 LBS | HEART RATE: 76 BPM | BODY MASS INDEX: 33.73 KG/M2 | TEMPERATURE: 98.3 F

## 2018-08-27 DIAGNOSIS — L03.115 CELLULITIS OF RIGHT LOWER EXTREMITY: Primary | ICD-10-CM

## 2018-08-27 PROCEDURE — 99213 OFFICE O/P EST LOW 20 MIN: CPT | Performed by: PHYSICIAN ASSISTANT

## 2018-08-27 RX ORDER — DOXYCYCLINE HYCLATE 100 MG/1
100 CAPSULE ORAL 2 TIMES DAILY
Qty: 10 CAPSULE | Refills: 0 | Status: SHIPPED | OUTPATIENT
Start: 2018-08-27 | End: 2018-09-01

## 2018-08-27 ASSESSMENT — ENCOUNTER SYMPTOMS
DIARRHEA: 0
VOMITING: 0
SHORTNESS OF BREATH: 0
NAUSEA: 0
ABDOMINAL PAIN: 0
CONSTIPATION: 0
COUGH: 0

## 2018-08-27 NOTE — PROGRESS NOTES
adenopathy  Lymphatics - no palpable lymphadenopathy, no hepatosplenomegaly  Chest - clear to auscultation, symmetric air entry  Heart - normal rate, regular rhythm  Abdomen - soft, nontender, nondistended, no masses or organomegaly  Neurological - alert, oriented x3, normal speech, no focal sensory or motor deficit  Extremities - wound as above to right lower extremity, 3+ pitting bilaterally, tenderness present bilaterally, left leg without erythema    LABORATORY FINDINGS:    CBC:  Lab Results   Component Value Date    WBC 5.1 08/01/2018    HGB 11.7 08/01/2018     08/01/2018     06/03/2012     BMP:    Lab Results   Component Value Date     08/06/2018    K 4.2 08/06/2018    CL 95 08/06/2018    CO2 28 08/06/2018    BUN 19 08/06/2018    CREATININE 1.78 08/06/2018    GLUCOSE 85 08/06/2018    GLUCOSE 94 06/03/2012     HEMOGLOBIN A1C:   Lab Results   Component Value Date    LABA1C 5.0 04/12/2016     FASTING LIPID PANEL:  Lab Results   Component Value Date    CHOL 138 08/01/2018    HDL 49 08/01/2018    TRIG 198 (H) 08/01/2018     ASSESSMENT AND PLAN:      1. Cellulitis of right lower extremity  - discussed with Dr. Luis Hopson  - will have patient see Dr. Olive Gutierrez at wound clinic,  called and got expedited appointment for morning of 8/29/18   - will start another course of antibiotics until she sees wound care  - doxycycline hyclate (VIBRAMYCIN) 100 MG capsule; Take 1 capsule by mouth 2 times daily for 5 days  - discussed with patient and daughter at length, informed of signs and symptoms which require more emergent treatment, daughter lives very close and able will check patient frequently, patient and daughter agree with current plan     FOLLOW UP AND INSTRUCTIONS:   Return in about 2 weeks (around 9/10/2018), or if symptoms worsen or fail to improve.     [place] received counseling on the following healthy behaviors: nutrition, exercise and medication adherence    Discussed use, benefit, and side effects of prescribed medications. Barriers to medication compliance addressed. All patient questions answered. Patient voiced understanding. LIA Leonardo Cox [place]  8/27/2018, 3:57 PM    Please note that this chart was generated using voice recognition Dragon dictation software. Although every effort was made to ensure the accuracy of this automated transcription, some errors in transcription may have occurred.

## 2018-08-29 ENCOUNTER — HOSPITAL ENCOUNTER (OUTPATIENT)
Dept: MRI IMAGING | Age: 83
Discharge: HOME | End: 2018-08-31
Payer: COMMERCIAL

## 2018-08-29 ENCOUNTER — HOSPITAL ENCOUNTER (OUTPATIENT)
Dept: GENERAL RADIOLOGY | Age: 83
Discharge: HOME | End: 2018-08-31
Payer: COMMERCIAL

## 2018-08-29 DIAGNOSIS — R13.10 DYSPHAGIA, UNSPECIFIED TYPE: ICD-10-CM

## 2018-08-29 DIAGNOSIS — N18.4 CKD (CHRONIC KIDNEY DISEASE) STAGE 4, GFR 15-29 ML/MIN (HCC): Chronic | ICD-10-CM

## 2018-08-29 DIAGNOSIS — R13.19 ESOPHAGEAL DYSPHAGIA: ICD-10-CM

## 2018-08-29 PROCEDURE — 74230 X-RAY XM SWLNG FUNCJ C+: CPT

## 2018-08-29 PROCEDURE — 70540 MRI ORBIT/FACE/NECK W/O DYE: CPT

## 2018-08-29 PROCEDURE — G8998 SWALLOW D/C STATUS: HCPCS

## 2018-08-29 PROCEDURE — 92611 MOTION FLUOROSCOPY/SWALLOW: CPT

## 2018-08-29 PROCEDURE — G8997 SWALLOW GOAL STATUS: HCPCS

## 2018-08-29 PROCEDURE — G8996 SWALLOW CURRENT STATUS: HCPCS

## 2018-08-29 NOTE — PROCEDURES
Outcome Severity Scale: Level 7: Normal in all situations       Recommended Diet:  Solid consistency: Regular  Liquid consistency: Thin            Safe Swallow Protocol:     Compensatory Swallowing Strategies: Upright as possible for all oral intake;Eat/Feed slowly              Recommendations/Treatment  Requires SLP Intervention: No              Education: Images and recommendations were reviewed with pt. And her daughter following this exam.      Patient Education Response: Verbalizes understanding                               Oral Preparation / Oral Phase  Oral Phase: WNL (premature vallecular spillage)        Pharyngeal Phase  Pharyngeal Phase: WNL      Esophageal Phase  Esophageal Screen: WNL        Pain   Patient Currently in Pain: No       G-Code:  SLP G-Codes  Functional Limitations: Swallowing  Swallow Current Status (): 0 percent impaired, limited or restricted  Swallow Goal Status (): 0 percent impaired, limited or restricted  Swallow Discharge Status (): 0 percent impaired, limited or restricted      Therapy Time:   Individual Concurrent Group Co-treatment   Time In 1350         Time Out 1415         Minutes 25                   Lucho MCKEON A.CCC/SLP     8/29/2018, 2:23 PM

## 2018-08-30 ENCOUNTER — HOSPITAL ENCOUNTER (OUTPATIENT)
Dept: WOUND CARE | Age: 83
Discharge: HOME | End: 2018-08-30
Payer: COMMERCIAL

## 2018-08-30 VITALS
SYSTOLIC BLOOD PRESSURE: 130 MMHG | HEART RATE: 78 BPM | TEMPERATURE: 99.3 F | DIASTOLIC BLOOD PRESSURE: 65 MMHG | WEIGHT: 167 LBS | RESPIRATION RATE: 16 BRPM | BODY MASS INDEX: 33.67 KG/M2 | HEIGHT: 59 IN

## 2018-08-30 DIAGNOSIS — F31.31 BIPOLAR AFFECTIVE DISORDER, CURRENTLY DEPRESSED, MILD (HCC): ICD-10-CM

## 2018-08-30 DIAGNOSIS — S81.801A NON-HEALING WOUND OF LOWER EXTREMITY, RIGHT, INITIAL ENCOUNTER: Primary | ICD-10-CM

## 2018-08-30 DIAGNOSIS — E03.4 HYPOTHYROIDISM DUE TO ACQUIRED ATROPHY OF THYROID: ICD-10-CM

## 2018-08-30 DIAGNOSIS — J43.8 OTHER EMPHYSEMA (HCC): ICD-10-CM

## 2018-08-30 DIAGNOSIS — N18.4 STAGE 4 CHRONIC KIDNEY DISEASE (HCC): ICD-10-CM

## 2018-08-30 DIAGNOSIS — I21.09 MYOCARDIAL INFARCTION INVOLVING OTHER CORONARY ARTERY OF ANTERIOR WALL, UNSPECIFIED MI TYPE (HCC): ICD-10-CM

## 2018-08-30 DIAGNOSIS — S81.801A LEG WOUND, RIGHT, INITIAL ENCOUNTER: Chronic | ICD-10-CM

## 2018-08-30 DIAGNOSIS — I50.22 CHRONIC SYSTOLIC CONGESTIVE HEART FAILURE (HCC): ICD-10-CM

## 2018-08-30 LAB
CULTURE: NORMAL
CULTURE: NORMAL
DIRECT EXAM: NORMAL
Lab: NORMAL
SPECIMEN DESCRIPTION: NORMAL
STATUS: NORMAL

## 2018-08-30 PROCEDURE — 87186 SC STD MICRODIL/AGAR DIL: CPT

## 2018-08-30 PROCEDURE — 87070 CULTURE OTHR SPECIMN AEROBIC: CPT

## 2018-08-30 PROCEDURE — 11042 DBRDMT SUBQ TIS 1ST 20SQCM/<: CPT

## 2018-08-30 PROCEDURE — 99214 OFFICE O/P EST MOD 30 MIN: CPT

## 2018-08-30 PROCEDURE — 87205 SMEAR GRAM STAIN: CPT

## 2018-08-30 PROCEDURE — 87176 TISSUE HOMOGENIZATION CULTR: CPT

## 2018-08-30 PROCEDURE — 6370000000 HC RX 637 (ALT 250 FOR IP): Performed by: SURGERY

## 2018-08-30 PROCEDURE — 86403 PARTICLE AGGLUT ANTBDY SCRN: CPT

## 2018-08-30 PROCEDURE — 87075 CULTR BACTERIA EXCEPT BLOOD: CPT

## 2018-08-30 RX ORDER — LIDOCAINE HYDROCHLORIDE 40 MG/ML
SOLUTION TOPICAL ONCE
Status: COMPLETED | OUTPATIENT
Start: 2018-08-30 | End: 2018-08-30

## 2018-08-30 RX ADMIN — LIDOCAINE HYDROCHLORIDE 10 ML: 40 SOLUTION TOPICAL at 15:20

## 2018-08-30 ASSESSMENT — PAIN SCALES - GENERAL: PAINLEVEL_OUTOF10: 0

## 2018-08-30 NOTE — PROGRESS NOTES
[facility]   Progress Note and Procedure Note      XIOMY Scherer 1 RECORD NUMBER:  [id number]  AGE: 80 y.o. GENDER: female  : 1927  EPISODE DATE:  2018    Subjective:     Chief Complaint   Patient presents with    Wound Check     right lower leg    She is a 80year old female who was seen in her PCP office on 2018 and they noted redness swelling and foul smell coming from her right lower leg wound. She was first seen with this wound on 2018. Her daughter is with her today and provides some history. She has chronic pain and swelling in the bilateral legs. She had been on a trip to [facility] and rode around in a motorized cart. They noticed the wound after her return home but do not recall any specific trauma to the leg. She has compression stockings at home but does not wear them. She has been placing a duoderm over the wound daily. She has no history of blood clots or DVTs. She only take an baby ASA daily for blood thinner. She was placed on doxycycline for the wound. HISTORY of PRESENT ILLNESS HPI     Valeria Ramírez is a 80 y.o. female who presents today for wound/ulcer evaluation.    History of Wound Context: right anterior lower leg wound as described  Wound/Ulcer Pain Timing/Severity: intermittent  Quality of pain: sharp, aching, burning  Severity:  8 / 10   Modifying Factors: Pain worsens with debridement  Associated Signs/Symptoms: edema, odor and pain    Ulcer Identification:  Ulcer Type: traumatic  Contributing Factors: edema    Wound: N/A        PAST MEDICAL HISTORY        Diagnosis Date    B[facility] disorder [facility] [address]     CHF (congestive heart failure) (HCC)     Chronic kidney disease     COPD [facility]chronic obstructive pulmonary disease) [facility] [address] [facility]     Coronary artery disease involving native coronary artery of native heart with unstable angina pectoris ([facility] [address] 2017    Depression     Esophageal dysphagia     GERD (gastroesophageal 400 Units by mouth daily      magnesium (MAGNESIUM-OXIDE) 250 MG TABS tablet Take 250 mg by mouth daily      B Complex Vitamins (VITAMIN B COMPLEX) TABS Take 1 tablet by mouth daily      levothyroxine (SYNTHROID) 100 MCG tablet Take 1 tablet by mouth daily 30 tablet 11    nitroGLYCERIN (NITROSTAT) 0.4 MG SL tablet up to max of 3 total doses. If no relief after 1 dose, call 911. 25 tablet 3    Misc. Devices (ROLLER WALKER) MISC 1 each by Does not apply route continuous 1 each 0    ranolazine (RANEXA) 500 MG extended release tablet Take 500 mg by mouth 2 times daily      acetaminophen (TYLENOL) 500 MG tablet Take 500 mg by mouth every 8 hours as needed for Pain (1-2 tabs) Indications: Pain      docusate sodium (COLACE, DULCOLAX) 100 MG CAPS Take 100 mg by mouth daily 30 capsule 3    Krill Oil 300 MG CAPS Take 1 capsule by mouth daily.  aspirin 81 MG tablet Take 81 mg by mouth daily.  Multiple Vitamins-Minerals (THERAPEUTIC MULTIVITAMIN-MINERALS) tablet Take 1 tablet by mouth daily. No current facility-administered medications on file prior to encounter. REVIEW OF SYSTEMS    Pertinent items are noted in HPI.     Objective:      /65   Pulse 78   Temp 99.3 °F (37.4 °C)   Resp 16   Ht 4' 11\" (1.499 m)   Wt 167 lb (75.8 kg)   LMP  (LMP Unknown)   BMI 33.73 kg/m²     Wt Readings from Last 3 Encounters:   08/30/18 167 lb (75.8 kg)   08/27/18 167 lb (75.8 kg)   08/22/18 167 lb (75.8 kg)       PHYSICAL EXAM    General Appearance: alert and oriented to person, place and time, well developed and well- nourished, in no acute distress  Skin: warm and dry, no rash or erythema  Head: normocephalic and atraumatic  Eyes: pupils equal, round, and reactive to light, extraocular eye movements intact, conjunctivae normal  ENT: tympanic membrane, external ear and ear canal normal bilaterally, nose without deformity, nasal mucosa and turbinates normal without polyps  Neck: supple and non-tender

## 2018-08-31 ENCOUNTER — TELEPHONE (OUTPATIENT)
Dept: INTERNAL MEDICINE CLINIC | Age: 83
End: 2018-08-31

## 2018-08-31 ENCOUNTER — TELEPHONE (OUTPATIENT)
Dept: GASTROENTEROLOGY | Age: 83
End: 2018-08-31

## 2018-08-31 ENCOUNTER — OFFICE VISIT (OUTPATIENT)
Dept: INTERNAL MEDICINE CLINIC | Age: 83
End: 2018-08-31
Payer: COMMERCIAL

## 2018-08-31 VITALS
HEIGHT: 59 IN | WEIGHT: 167.11 LBS | BODY MASS INDEX: 33.69 KG/M2 | SYSTOLIC BLOOD PRESSURE: 122 MMHG | DIASTOLIC BLOOD PRESSURE: 68 MMHG

## 2018-08-31 DIAGNOSIS — J43.8 OTHER EMPHYSEMA (HCC): ICD-10-CM

## 2018-08-31 DIAGNOSIS — S81.802D WOUND OF LEFT LOWER EXTREMITY, SUBSEQUENT ENCOUNTER: Primary | ICD-10-CM

## 2018-08-31 PROCEDURE — 99213 OFFICE O/P EST LOW 20 MIN: CPT | Performed by: INTERNAL MEDICINE

## 2018-08-31 ASSESSMENT — ENCOUNTER SYMPTOMS
COLOR CHANGE: 0
CHEST TIGHTNESS: 0
ABDOMINAL PAIN: 0
ABDOMINAL DISTENTION: 0
BACK PAIN: 1
APNEA: 0
EYE ITCHING: 0
EYE REDNESS: 0
SHORTNESS OF BREATH: 0
EYE DISCHARGE: 0
EYE PAIN: 0
CHOKING: 0
BLOOD IN STOOL: 0
CONSTIPATION: 0
COUGH: 0
DIARRHEA: 0

## 2018-08-31 ASSESSMENT — PATIENT HEALTH QUESTIONNAIRE - PHQ9
1. LITTLE INTEREST OR PLEASURE IN DOING THINGS: 0
SUM OF ALL RESPONSES TO PHQ QUESTIONS 1-9: 0
SUM OF ALL RESPONSES TO PHQ QUESTIONS 1-9: 0
SUM OF ALL RESPONSES TO PHQ9 QUESTIONS 1 & 2: 0
2. FEELING DOWN, DEPRESSED OR HOPELESS: 0

## 2018-08-31 NOTE — PROGRESS NOTES
Subjective:      Patient ID: [place] is a 80 y.o. female. Chronic Disease Visit Information    BP Readings from Last 3 Encounters:   08/30/18 130/65   08/27/18 128/61   08/22/18 (!) 120/51          Hemoglobin A1C (%)   Date Value   04/12/2016 5.0   09/05/2014 5.2     LDL Cholesterol (mg/dL)   Date Value   08/01/2018 49     HDL (mg/dL)   Date Value   08/01/2018 49     BUN (mg/dL)   Date Value   08/06/2018 19     CREATININE (mg/dL)   Date Value   08/06/2018 1.78 (H)     Glucose (mg/dL)   Date Value   08/06/2018 85   06/03/2012 94            Have you changed or started any medications since your last visit including any over-the-counter medicines, vitamins, or herbal medicines? no   Are you having any side effects from any of your medications? -  no  Have you stopped taking any of your medications? Is so, why? -  no    Have you seen any other physician or provider since your last visit? Yes - Records Obtained  Have you had any other diagnostic tests since your last visit? No  Have you been seen in the emergency room and/or had an admission to a hospital since we last saw you? No  Have you had your annual diabetic retinal (eye) exam? No  Have you had your routine dental cleaning in the past 6 months? no    Have you activated your [employer] account? If not, what are your barriers? Yes     Patient Care Team:  Eliana Roberson MD as PCP - Rasheed Nicholas MD as PCP - [facility] Attributed Provider  Rina Mcdermott DO as Consulting Physician (Obstetrics & Gynecology)  Tigre Brower MD as Consulting Physician (Nephrology)  Polo Driscoll MD as Consulting Physician (Gastroenterology)     Chief Complaint   Patient presents with    Cellulitis     right lower leg. pt is here to f/u.               Medical History Review  Past Medical, Family, and Social History reviewed and does contribute to the patient presenting condition    Health Maintenance   Topic Date Due    DTaP/Tdap/Td vaccine (1 - Tdap) 08/30/2019 Head: Normocephalic and atraumatic. Mouth/Throat: No oropharyngeal exudate. Eyes: Pupils are equal, round, and reactive to light. Conjunctivae are normal. Right eye exhibits no discharge. Left eye exhibits no discharge. No scleral icterus. Neck: Normal range of motion. Neck supple. No JVD present. No tracheal deviation present. No thyromegaly present. Cardiovascular: Normal rate and normal heart sounds. Exam reveals no gallop. No murmur heard. Pulmonary/Chest: Effort normal and breath sounds normal. No stridor. No respiratory distress. She has no wheezes. She has no rales. She exhibits no tenderness. Abdominal: Soft. Bowel sounds are normal. She exhibits no distension. There is no tenderness. There is no rebound and no guarding. Musculoskeletal: Normal range of motion. She exhibits no edema. Ulcer present in right leg , healing stages    Neurological: She is alert and oriented to person, place, and time. Skin: She is not diaphoretic. Assessment / Plan:   1. Wound of left lower extremity, subsequent encounter  Healing well , follows with wound clinic  Patient had Questions , call Dr Karla Anaya with Patient         Return in about 2 months (around 10/31/2018). · Reviewed prior labs and health maintenance. · Discussed use, benefit, and side effects of prescribed medications. Barriers to medication compliance addressed. All patient questions answered. Pt voiced understanding. MD STANISLAV Camacho [facility]  8/31/2018, 11:41 AM    Please note that this chart was generated using voice recognition Dragon dictation software. Although every effort was made to ensure the accuracy of this automated transcription, some errors in transcription may have occurred.

## 2018-08-31 NOTE — TELEPHONE ENCOUNTER
Spoke with daughter and discussed understanding of egd instructions, arrival time of 7am and that daughter will drive patient to procedure on Tuesday 9/4.

## 2018-09-04 ENCOUNTER — HOSPITAL ENCOUNTER (OUTPATIENT)
Age: 83
Setting detail: OUTPATIENT SURGERY
Discharge: HOME | End: 2018-09-04
Attending: INTERNAL MEDICINE | Admitting: INTERNAL MEDICINE
Payer: COMMERCIAL

## 2018-09-04 VITALS
TEMPERATURE: 98.1 F | BODY MASS INDEX: 34.63 KG/M2 | RESPIRATION RATE: 16 BRPM | WEIGHT: 165 LBS | OXYGEN SATURATION: 96 % | SYSTOLIC BLOOD PRESSURE: 122 MMHG | HEIGHT: 58 IN | DIASTOLIC BLOOD PRESSURE: 54 MMHG | HEART RATE: 65 BPM

## 2018-09-04 LAB
CULTURE: ABNORMAL
CULTURE: ABNORMAL
DIRECT EXAM: ABNORMAL
DIRECT EXAM: ABNORMAL
Lab: ABNORMAL
ORGANISM: ABNORMAL
SPECIMEN DESCRIPTION: ABNORMAL
STATUS: ABNORMAL

## 2018-09-04 PROCEDURE — 88342 IMHCHEM/IMCYTCHM 1ST ANTB: CPT

## 2018-09-04 PROCEDURE — 2580000003 HC RX 258: Performed by: INTERNAL MEDICINE

## 2018-09-04 PROCEDURE — 7100000010 HC PHASE II RECOVERY - FIRST 15 MIN: Performed by: INTERNAL MEDICINE

## 2018-09-04 PROCEDURE — 2709999900 HC NON-CHARGEABLE SUPPLY: Performed by: INTERNAL MEDICINE

## 2018-09-04 PROCEDURE — 88305 TISSUE EXAM BY PATHOLOGIST: CPT

## 2018-09-04 PROCEDURE — 99152 MOD SED SAME PHYS/QHP 5/>YRS: CPT | Performed by: INTERNAL MEDICINE

## 2018-09-04 PROCEDURE — 6360000002 HC RX W HCPCS: Performed by: INTERNAL MEDICINE

## 2018-09-04 PROCEDURE — 3609012400 HC EGD TRANSORAL BIOPSY SINGLE/MULTIPLE: Performed by: INTERNAL MEDICINE

## 2018-09-04 PROCEDURE — 43239 EGD BIOPSY SINGLE/MULTIPLE: CPT | Performed by: INTERNAL MEDICINE

## 2018-09-04 PROCEDURE — 7100000011 HC PHASE II RECOVERY - ADDTL 15 MIN: Performed by: INTERNAL MEDICINE

## 2018-09-04 RX ORDER — MEPERIDINE HYDROCHLORIDE 50 MG/ML
INJECTION INTRAMUSCULAR; INTRAVENOUS; SUBCUTANEOUS PRN
Status: DISCONTINUED | OUTPATIENT
Start: 2018-09-04 | End: 2018-09-04 | Stop reason: HOSPADM

## 2018-09-04 RX ORDER — MIDAZOLAM HYDROCHLORIDE 1 MG/ML
INJECTION INTRAMUSCULAR; INTRAVENOUS PRN
Status: DISCONTINUED | OUTPATIENT
Start: 2018-09-04 | End: 2018-09-04 | Stop reason: HOSPADM

## 2018-09-04 RX ORDER — SODIUM CHLORIDE, SODIUM LACTATE, POTASSIUM CHLORIDE, CALCIUM CHLORIDE 600; 310; 30; 20 MG/100ML; MG/100ML; MG/100ML; MG/100ML
INJECTION, SOLUTION INTRAVENOUS CONTINUOUS
Status: DISCONTINUED | OUTPATIENT
Start: 2018-09-04 | End: 2018-09-04 | Stop reason: HOSPADM

## 2018-09-04 RX ADMIN — SODIUM CHLORIDE, POTASSIUM CHLORIDE, SODIUM LACTATE AND CALCIUM CHLORIDE: 600; 310; 30; 20 INJECTION, SOLUTION INTRAVENOUS at 07:20

## 2018-09-04 ASSESSMENT — PAIN SCALES - GENERAL: PAINLEVEL_OUTOF10: 0

## 2018-09-04 ASSESSMENT — PAIN DESCRIPTION - DESCRIPTORS: DESCRIPTORS: ACHING

## 2018-09-04 ASSESSMENT — PAIN - FUNCTIONAL ASSESSMENT: PAIN_FUNCTIONAL_ASSESSMENT: 0-10

## 2018-09-04 NOTE — H&P
0    Krill Oil 300 MG CAPS Take 1 capsule by mouth daily.  aspirin 81 MG tablet Take 81 mg by mouth daily. Negative except for what is mentioned in the HPI. GENERAL PHYSICAL EXAM     Vitals: BP (!) 145/63   Pulse 71   Temp 97.5 °F (36.4 °C) (Oral)   Resp 18   Ht 4' 10\" (1.473 m)   Wt 165 lb (74.8 kg)   LMP  (LMP Unknown)   SpO2 98%   BMI 34.49 kg/m²  Body mass index is 34.49 kg/m². GENERAL APPEARANCE:   Glenn Ziegler is 80 y.o.,  female, mildly obese, nourished, conscious, alert. Does not appear to be distress or pain at this time. SKIN:  Warm, dry, no cyanosis or jaundice. drsg to the right leg. HEAD:  Normocephalic, atraumatic, no swelling or tenderness. EYES:  Pupils equal, reactive to light. EARS:  No discharge. [place]               NOSE:  No rhinorrhea, epistaxis or septal deformity. THROAT:  Not congested. No ulceration bleeding or discharge. NECK:  No stiffness, trachea central.  No palpable masses or L.N.                 CHEST:  Symmetrical and equal on expansion. HEART:  RRR S1 > S2. No audible murmurs or gallops. LUNGS:  Equal on expansion, normal breath sounds. No adventitious sounds. ABDOMEN:  Mildly obese. Soft on palpation. No localized tenderness. No guarding or rigidity. No palpable hepatosplenomegaly. LYMPHATICS:  No palpable cervical lymphadenopathy. LOCOMOTOR, BACK AND SPINE:  No tenderness or deformities. EXTREMITIES:  Symmetrical, no pretibial edema. Bhavyas sign negative. No discoloration or ulcerations. Tenderness to michael LE.     NEUROLOGIC:  The patient is conscious, alert, oriented, No apparent focal sensory or motor deficits.                       PROVISIONAL DIAGNOSES / SURGERY:        Esophageal dysphagia     Constipation, unspecified constipation type     Throat

## 2018-09-04 NOTE — OP NOTE
PROCEDURE NOTE    DATE OF PROCEDURE: 9/4/2018     SURGEON: Tammi Castro MD    ASSISTANT: None    PREOPERATIVE DIAGNOSIS: DYSPHAGIA IN THE THROAT AREA MAINLY WITH SALIVA     POSTOPERATIVE DIAGNOSIS: As described below    OPERATION: Upper GI endoscopy with Biopsy    ANESTHESIA: Moderate Sedation     ESTIMATED BLOOD LOSS: Less than 50 ml    COMPLICATIONS: None. SPECIMENS:  Was Obtained:     HISTORY: The patient is a 80y.o. year old female with history of above preop diagnosis. I recommended esophagogastroduodenoscopy with possible biopsy and I explained the risk, benefits, expected outcome, and alternatives to the procedure. Risks included but are not limited to bleeding, infection, respiratory distress, hypotension, and perforation of the esophagus, stomach, or duodenum. Patient understands and is in agreement. PROCEDURE: The patient was given IV conscious sedation. The patient's SPO2 remained above 90% throughout the procedure. The gastroscope was inserted orally and advanced under direct vision through the esophagus, through the stomach, through the pylorus, and into the descending duodenum. Findings:    Retropharyngeal area was grossly normal appearing    Esophagus: abnormal: SOME THICK SECRETIONS  A DEVELOPING DIVERTICULUM IN THE PROXIMAL TO MID ESOPHAGEAL AREA  NO LUMINAL OBSTRUCTION     Stomach:    Fundus: normal    Body: normal    Antrum: abnormal: MILD TO MOD EROSIVE GASTRITIS WAS BIOPSIED    Duodenum:     Descending: normal    Bulb: normal    The scope was removed and the patient tolerated the procedure well. Recommendations/Plan:   1. F/U Biopsies  2. F/U In Office in 3-4 weeks  3. Discussed with the family  4.  Post sedation patient was stable with stable vital signs and stable O2 saturations    Electronically signed by Tammi Castro MD  on 9/4/2018 at 8:38 AM

## 2018-09-06 ENCOUNTER — HOSPITAL ENCOUNTER (OUTPATIENT)
Dept: WOUND CARE | Age: 83
Discharge: HOME | End: 2018-09-06
Payer: COMMERCIAL

## 2018-09-06 VITALS
TEMPERATURE: 98.9 F | SYSTOLIC BLOOD PRESSURE: 114 MMHG | BODY MASS INDEX: 34.63 KG/M2 | HEART RATE: 81 BPM | RESPIRATION RATE: 16 BRPM | WEIGHT: 165 LBS | DIASTOLIC BLOOD PRESSURE: 68 MMHG | HEIGHT: 58 IN

## 2018-09-06 LAB — SURGICAL PATHOLOGY REPORT: NORMAL

## 2018-09-06 PROCEDURE — 6370000000 HC RX 637 (ALT 250 FOR IP): Performed by: SURGERY

## 2018-09-06 PROCEDURE — 11042 DBRDMT SUBQ TIS 1ST 20SQCM/<: CPT

## 2018-09-06 RX ORDER — LIDOCAINE HYDROCHLORIDE 40 MG/ML
SOLUTION TOPICAL ONCE
Status: COMPLETED | OUTPATIENT
Start: 2018-09-06 | End: 2018-09-06

## 2018-09-06 RX ADMIN — LIDOCAINE HYDROCHLORIDE 5 ML: 40 SOLUTION TOPICAL at 14:23

## 2018-09-06 ASSESSMENT — PAIN SCALES - GENERAL: PAINLEVEL_OUTOF10: 0

## 2018-09-06 NOTE — PROGRESS NOTES
[facility]   Progress Note and Procedure Note      R Shonda Scherer 1 RECORD NUMBER:  [id number]  AGE: 80 y.o. GENDER: female  : 1927  EPISODE DATE:  2018    Subjective:     Chief Complaint   Patient presents with    Wound Check     right pretib         HISTORY of PRESENT ILLNESS HPI     Brian Lehman is a 80 y.o. female who presents today for wound/ulcer evaluation.    History of Wound Context: right anterior tibial wound  Wound/Ulcer Pain Timing/Severity: constant  Quality of pain: aching  Severity:  3 / 10   Modifying Factors: Pain worsens with debridement  Associated Signs/Symptoms: none    Ulcer Identification:  Ulcer Type: traumatic  Contributing Factors: venous stasis    Wound: N/A        PAST MEDICAL HISTORY        Diagnosis Date    Bipolar disorder ([address]     CH[facility]congest[facility]) [facility]     Chronic kidney disease     COPD (chronic obstructive pulmonary disease) ([facility]     Coronary artery disease involving native coronary artery of native heart with unstable angina pectoris ([address] 2017    Depression     Esophageal dysphagia     GERD (gastroesophageal reflux disease)     History of skin cancer     nose x3    HTN (hypertension) 2016    Hypothyroidism     MI (myocardial infarction) ([address]     Osteoarthritis     Sleep apnea     Does not use machine       PAST SURGICAL HISTORY    Past Surgical History:   Procedure Laterality Date    BLADDER SUSPENSION      x3    CARDIAC CATHETERIZATION  4-    severe disease small d1 and PDA    CATARACT REMOVAL Bilateral     CHOLECYSTECTOMY      HYSTERECTOMY      ovaries removed at a later date    NOSE SURGERY      cancer removed    SALPINGO-OOPHORECTOMY      UPPER GASTROINTESTINAL ENDOSCOPY N/A 2018    EGD BIOPSY performed by Albania Villalba MD at [address] [facility]    Family History   Problem Relation Age of Onset    Heart Disease Mother     Cancer Father     Stroke Sister     Heart Disease Sister        SOCIAL HISTORY    Social History   Substance Use Topics    Smoking status: Former Smoker    Smokeless tobacco: Never Used    Alcohol use No       ALLERGIES    Allergies   Allergen Reactions    Dye [Iodides] Hives     IVP dye caused reaction with hives        MEDICATIONS    Current Outpatient Prescriptions on File Prior to Encounter   Medication Sig Dispense Refill    sertraline (ZOLOFT) 100 MG tablet TAKE 1 TABLET BY MOUTH DAILY 30 tablet 8    famotidine (PEPCID) 20 MG tablet TAKE 1 TABLET BY MOUTH 2 TIMES DAILY 60 tablet 8    bumetanide (BUMEX) 2 MG tablet Take 1 tablet by mouth daily Takes 2 mg in am and 1 mg in pm 30 tablet 3    atorvastatin (LIPITOR) 40 MG tablet Take 1 tablet by mouth nightly 30 tablet 3    bumetanide (BUMEX) 1 MG tablet Take 1 tablet by mouth daily 60 tablet 2    metoprolol tartrate (LOPRESSOR) 25 MG tablet Take 0.5 tablets by mouth daily 30 tablet 11    vitamin E 400 UNIT capsule Take 400 Units by mouth daily      magnesium (MAGNESIUM-OXIDE) 250 MG TABS tablet Take 250 mg by mouth daily      B Complex Vitamins (VITAMIN B COMPLEX) TABS Take 1 tablet by mouth daily      levothyroxine (SYNTHROID) 100 MCG tablet Take 1 tablet by mouth daily 30 tablet 11    nitroGLYCERIN (NITROSTAT) 0.4 MG SL tablet up to max of 3 total doses. If no relief after 1 dose, call 911. 18 tablet 3    Misc. Devices (ROLLER WALKER) MISC 1 each by Does not apply route continuous 1 each 0    ranolazine (RANEXA) 500 MG extended release tablet Take 500 mg by mouth 2 times daily      acetaminophen (TYLENOL) 500 MG tablet Take 500 mg by mouth every 8 hours as needed for Pain (1-2 tabs) Indications: Pain      docusate sodium (COLACE, DULCOLAX) 100 MG CAPS Take 100 mg by mouth daily 30 capsule 3    Krill Oil 300 MG CAPS Take 1 capsule by mouth daily.  aspirin 81 MG tablet Take 81 mg by mouth daily.       Multiple Vitamins-Minerals (THERAPEUTIC MULTIVITAMIN-MINERALS) ROOM TYPE   [x] CHAIR     [] BED   [] EITHER        TIME [] 45 MIN     [] 60 MIN  (Please note your next appointment above and if you are unable to keep, kindly give a 24 hour notice.  Thank you.)     If you experience any of the following, please call the [facility] during business hours:  [phone]     * Increase in Pain  * Temperature over 101  * Increase in drainage from your wound  * Drainage with a foul odor  * Bleeding  * Increase in swelling  * Need for compression bandage changes due to slippage, breakthrough drainage.     If you need medical attention outside of the business hours of the [facility] please contact your PCP or go to the nearest emergency room.     AVS REVIEWED    YES [x]     Patient Signature:__________________________________________________Date:_______  Electronically signed by Rebel Dodge RN on 9/6/2018 at 2:51 PM          Electronically signed by Dariana Santos MD on 9/6/2018 at 3:00 PM

## 2018-09-10 ENCOUNTER — OFFICE VISIT (OUTPATIENT)
Dept: GASTROENTEROLOGY | Age: 83
End: 2018-09-10
Payer: COMMERCIAL

## 2018-09-10 VITALS
WEIGHT: 164.4 LBS | SYSTOLIC BLOOD PRESSURE: 118 MMHG | DIASTOLIC BLOOD PRESSURE: 60 MMHG | BODY MASS INDEX: 34.36 KG/M2 | HEART RATE: 73 BPM

## 2018-09-10 DIAGNOSIS — R13.19 ESOPHAGEAL DYSPHAGIA: Primary | ICD-10-CM

## 2018-09-10 DIAGNOSIS — R07.0 THROAT DISCOMFORT: ICD-10-CM

## 2018-09-10 PROCEDURE — 99214 OFFICE O/P EST MOD 30 MIN: CPT | Performed by: INTERNAL MEDICINE

## 2018-09-10 ASSESSMENT — ENCOUNTER SYMPTOMS
ABDOMINAL PAIN: 0
SHORTNESS OF BREATH: 1
BACK PAIN: 1
CONSTIPATION: 1
BLOOD IN STOOL: 0
CHOKING: 1
ANAL BLEEDING: 0
ABDOMINAL DISTENTION: 0
DIARRHEA: 0
RECTAL PAIN: 0
NAUSEA: 0
VOMITING: 0
EYES NEGATIVE: 1
TROUBLE SWALLOWING: 1

## 2018-09-10 NOTE — PROGRESS NOTES
bacterial enrique and will help with the digestion and bowel movements  The patient has verbalized understanding and agreement to this plan

## 2018-09-13 ENCOUNTER — HOSPITAL ENCOUNTER (OUTPATIENT)
Dept: WOUND CARE | Age: 83
Discharge: HOME | End: 2018-09-13
Payer: COMMERCIAL

## 2018-09-13 VITALS
BODY MASS INDEX: 34.43 KG/M2 | SYSTOLIC BLOOD PRESSURE: 127 MMHG | RESPIRATION RATE: 16 BRPM | HEIGHT: 58 IN | HEART RATE: 85 BPM | TEMPERATURE: 98.7 F | DIASTOLIC BLOOD PRESSURE: 60 MMHG | WEIGHT: 164 LBS

## 2018-09-13 PROCEDURE — 6370000000 HC RX 637 (ALT 250 FOR IP): Performed by: SURGERY

## 2018-09-13 PROCEDURE — 11042 DBRDMT SUBQ TIS 1ST 20SQCM/<: CPT

## 2018-09-13 RX ORDER — LIDOCAINE HYDROCHLORIDE 40 MG/ML
SOLUTION TOPICAL ONCE
Status: COMPLETED | OUTPATIENT
Start: 2018-09-13 | End: 2018-09-13

## 2018-09-13 RX ADMIN — LIDOCAINE HYDROCHLORIDE 5 ML: 40 SOLUTION TOPICAL at 14:12

## 2018-09-13 ASSESSMENT — PAIN SCALES - GENERAL: PAINLEVEL_OUTOF10: 0

## 2018-09-13 NOTE — PROGRESS NOTES
[facility]   Progress Note and Procedure Note      R Shonda Scherer 1 RECORD NUMBER:  [id number]  AGE: 80 y.o. GENDER: female  : 1927  EPISODE DATE:  2018    Subjective:     Chief Complaint   Patient presents with    Wound Check     right pretib         HISTORY of PRESENT ILLNESS HPI     Jassi Wilson is a 80 y.o. female who presents today for wound/ulcer evaluation.    History of Wound Context: right anterior traumatic wound of lower leg  Wound/Ulcer Pain Timing/Severity: constant  Quality of pain: sharp  Severity:  3 / 10   Modifying Factors: Pain worsens with debridement  Associated Signs/Symptoms: edema    Ulcer Identification:  Ulcer Type: traumatic  Contributing Factors: edema    Wound: N/A        PAST MEDICAL HISTORY        Diagnosis Date    Bipolar disorder ([address]     CHF (congestive heart failure) (HCC)     Chronic kidney disease     [facility]     Coronary artery disease involving native coronary artery of native heart with unstable angina pectoris ([address] 2017    Depression     Esophageal dysphagia     GERD (gastroesophageal reflux disease)     History of skin cancer     nose x3    HTN (hypertension) 2016    Hypothyroidism     MI (myocardial infarction) ([address]     Osteoarthritis     Sleep apnea     Does not use machine       PAST SURGICAL HISTORY    Past Surgical History:   Procedure Laterality Date    BLADDER SUSPENSION      x3    CARDIAC CATHETERIZATION  4-13    severe disease small d1 and PDA    CATARACT REMOVAL Bilateral     CHOLECYSTECTOMY      HYSTERECTOMY      ovaries removed at a later date    NOSE SURGERY      cancer removed    SALPINGO-OOPHORECTOMY      UPPER GASTROINTESTINAL ENDOSCOPY N/A 2018    EGD BIOPSY performed by Alfie Mckeon MD at [facility]    Family History   Problem Relation Age of Onset    Heart Disease Mother     Cancer Father    Misty Conn MULTIVITAMIN-MINERALS) tablet Take 1 tablet by mouth daily. No current facility-administered medications on file prior to encounter. REVIEW OF SYSTEMS    Pertinent items are noted in HPI.     Objective:      /60   Pulse 85   Temp 98.7 °F (37.1 °C) (Tympanic)   Resp 16   Ht 4' 10\" (1.473 m)   Wt 164 lb (74.4 kg)   LMP  (LMP Unknown)   BMI 34.28 kg/m²     Wt Readings from Last 3 Encounters:   09/13/18 164 lb (74.4 kg)   09/10/18 164 lb 6.4 oz (74.6 kg)   09/06/18 165 lb (74.8 kg)       PHYSICAL EXAM    General Appearance: alert and oriented to person, place and time, well developed and well- nourished, in no acute distress  Skin: warm and dry, no rash or erythema  Head: normocephalic and atraumatic  Eyes: pupils equal, round, and reactive to light, extraocular eye movements intact, conjunctivae normal  ENT: tympanic membrane, external ear and ear canal normal bilaterally, nose without deformity, nasal mucosa and turbinates normal without polyps  Neck: supple and non-tender without mass, no thyromegaly or thyroid nodules, no cervical lymphadenopathy  Pulmonary/Chest: clear to auscultation bilaterally- no wheezes, rales or rhonchi, normal air movement, no respiratory distress  Cardiovascular: normal rate, regular rhythm, normal S1 and S2, no murmurs, rubs, clicks, or gallops, distal pulses intact, no carotid bruits  Abdomen: soft, non-tender, non-distended, normal bowel sounds, no masses or organomegaly  Extremities: no cyanosis, clubbing or edema  Musculoskeletal: normal range of motion, no joint swelling, deformity or tenderness  Neurologic: reflexes normal and symmetric, no cranial nerve deficit, gait, coordination and speech normal      Assessment:      Patient Active Problem List   Diagnosis Code    Depression F32.9    Osteoarthritis M19.90    CKD (chronic kidney disease) stage 4, GFR 15-29 ml/min ([facility]) N18.4    Bipolar 1 disorder, depressed ([facility] F31.9    Primary osteoarthritis ROOM TYPE   [x] CHAIR     [] BED   [] EITHER        TIME [] 45 MIN     [] 60 MIN  (Please note your next appointment above and if you are unable to keep, kindly give a 24 hour notice.  Thank you.)     If you experience any of the following, please call the [facility] during business hours:  [phone]     * Increase in Pain  * Temperature over 101  * Increase in drainage from your wound  * Drainage with a foul odor  * Bleeding  * Increase in swelling  * Need for compression bandage changes due to slippage, breakthrough drainage.     If you need medical attention outside of the business hours of the [facility] please contact your PCP or go to the nearest emergency room.     AVS REVIEWED    YES [x]     Patient Signature:__________________________________________________Date:_______  Electronically signed by Nixon Huggins RN on 9/13/2018 at 3:20 PM          Electronically signed by Alba Wright MD on 9/13/2018 at 3:34 PM

## 2018-09-14 DIAGNOSIS — K21.00 GASTROESOPHAGEAL REFLUX DISEASE WITH ESOPHAGITIS: ICD-10-CM

## 2018-09-14 NOTE — TELEPHONE ENCOUNTER
From: Hernandez Peralta  Sent: 9/14/2018 10:55 AM EDT  Subject: Medication Renewal Request    Radha Jaki.  Aneudy would like a refill of the following medications:     sertraline (ZOLOFT) 100 MG tablet [Yusra Gutierrez MD]     famotidine (PEPCID) 20 MG tablet Jake Singh MD]    Preferred pharmacy: [address] [place] [address]

## 2018-09-17 RX ORDER — FAMOTIDINE 20 MG/1
20 TABLET, FILM COATED ORAL 2 TIMES DAILY
Qty: 60 TABLET | Refills: 8 | Status: SHIPPED | OUTPATIENT
Start: 2018-09-17 | End: 2019-01-03

## 2018-09-17 RX ORDER — SERTRALINE HYDROCHLORIDE 100 MG/1
100 TABLET, FILM COATED ORAL DAILY
Qty: 30 TABLET | Refills: 8 | Status: SHIPPED | OUTPATIENT
Start: 2018-09-17 | End: 2019-04-29 | Stop reason: SDUPTHER

## 2018-09-24 ENCOUNTER — TELEPHONE (OUTPATIENT)
Dept: INTERNAL MEDICINE CLINIC | Age: 83
End: 2018-09-24

## 2018-09-24 RX ORDER — LEVOTHYROXINE SODIUM 0.1 MG/1
100 TABLET ORAL DAILY
Qty: 30 TABLET | Refills: 11 | Status: SHIPPED | OUTPATIENT
Start: 2018-09-24 | End: 2018-10-29 | Stop reason: SDUPTHER

## 2018-09-24 NOTE — TELEPHONE ENCOUNTER
Jacky Mistry called to ck on the status of   Plan of Care & Physican Order orginialy faxed back mid Aug and again last week. Could you please return call w/ status?

## 2018-09-27 ENCOUNTER — HOSPITAL ENCOUNTER (OUTPATIENT)
Dept: WOUND CARE | Age: 83
Discharge: HOME | End: 2018-09-27
Payer: COMMERCIAL

## 2018-09-27 VITALS
RESPIRATION RATE: 18 BRPM | TEMPERATURE: 98.4 F | BODY MASS INDEX: 33.67 KG/M2 | WEIGHT: 167 LBS | HEIGHT: 59 IN | HEART RATE: 81 BPM | DIASTOLIC BLOOD PRESSURE: 66 MMHG | SYSTOLIC BLOOD PRESSURE: 118 MMHG

## 2018-09-27 PROCEDURE — 99212 OFFICE O/P EST SF 10 MIN: CPT

## 2018-09-27 RX ORDER — LIDOCAINE HYDROCHLORIDE 40 MG/ML
SOLUTION TOPICAL ONCE
Status: DISCONTINUED | OUTPATIENT
Start: 2018-09-27 | End: 2018-09-28 | Stop reason: HOSPADM

## 2018-09-27 ASSESSMENT — PAIN DESCRIPTION - DESCRIPTORS: DESCRIPTORS: ACHING

## 2018-09-27 ASSESSMENT — PAIN SCALES - GENERAL: PAINLEVEL_OUTOF10: 0

## 2018-09-27 ASSESSMENT — PAIN DESCRIPTION - ONSET: ONSET: ON-GOING

## 2018-09-27 NOTE — PROGRESS NOTES
[facility]   Progress Note and Procedure Note      R Shonda Scherer 1 RECORD NUMBER:  [id number]  AGE: 80 y.o. GENDER: female  : 1927  EPISODE DATE:  2018    Subjective:     Chief Complaint   Patient presents with    Wound Check     right lower leg         HISTORY of PRESENT ILLNESS HPI     Ranjana Garcia is a 80 y.o. female who presents today for wound/ulcer evaluation.    History of Wound Context: right leg traumatic wound  Wound/Ulcer Pain Timing/Severity: none  Quality of pain: N/A  Severity:  0 / 10   Modifying Factors: None  Associated Signs/Symptoms: edema    Ulcer Identification:  Ulcer Type: venous  Contributing Factors: edema and venous stasis    Wound: N/A        PAST MEDICAL HISTORY        Diagnosis Date    [facility]     [facility]congest[facility]) [facility]     Chronic kidney disease     COPD (chronic obstructive pulmonary disease) ([facility])     Coronary artery disease involving native coronary artery of native heart with unstable angina pectoris ([facility] 2017    Depression     Esophageal dysphagia     GERD (gastroesophageal reflux disease)     History of skin cancer     nose x3    HTN (hypertension) 2016    Hypothyroidism     MI [facility]myocardial infarction) [facility]     Osteoarthritis     Sleep apnea     Does not use machine       PAST SURGICAL HISTORY    Past Surgical History:   Procedure Laterality Date    BLADDER SUSPENSION      x3    CARDIAC CATHETERIZATION  4-    severe disease small d1 and PDA    CATARACT REMOVAL Bilateral     CHOLECYSTECTOMY      HYSTERECTOMY      ovaries removed at a later date    NOSE SURGERY      cancer removed    SALPINGO-OOPHORECTOMY      UPPER GASTROINTESTINAL ENDOSCOPY N/A 2018    EGD BIOPSY [facility] James [facility]    Family History   Problem Relation Age of Onset    Heart Disease Mother     Cancer Father     Stroke Sister     Heart tablet by mouth daily. No current facility-administered medications on file prior to encounter. REVIEW OF SYSTEMS    Pertinent items are noted in HPI.     Objective:      /66   Pulse 81   Temp 98.4 °F (36.9 °C) (Tympanic)   Resp 18   Ht 4' 11\" (1.499 m)   Wt 167 lb (75.8 kg)   LMP  (LMP Unknown)   BMI 33.73 kg/m²     Wt Readings from Last 3 Encounters:   09/27/18 167 lb (75.8 kg)   09/14/18 167 lb 9.6 oz (76 kg)   09/13/18 164 lb (74.4 kg)       PHYSICAL EXAM    General Appearance: alert and oriented to person, place and time, well developed and well- nourished, in no acute distress  Skin: warm and dry, no rash or erythema  Head: normocephalic and atraumatic  Eyes: pupils equal, round, and reactive to light, extraocular eye movements intact, conjunctivae normal  ENT: tympanic membrane, external ear and ear canal normal bilaterally, nose without deformity, nasal mucosa and turbinates normal without polyps  Neck: supple and non-tender without mass, no thyromegaly or thyroid nodules, no cervical lymphadenopathy  Pulmonary/Chest: clear to auscultation bilaterally- no wheezes, rales or rhonchi, normal air movement, no respiratory distress  Cardiovascular: normal rate, regular rhythm, normal S1 and S2, no murmurs, rubs, clicks, or gallops, distal pulses intact, no carotid bruits  Abdomen: soft, non-tender, non-distended, normal bowel sounds, no masses or organomegaly  Extremities: no cyanosis, clubbing or edema  Musculoskeletal: normal range of motion, no joint swelling, deformity or tenderness  Neurologic: reflexes normal and symmetric, no cranial nerve deficit, gait, coordination and speech normal      Assessment:      Patient Active Problem List   Diagnosis Code    Depression F32.9    Osteoarthritis M19.90    CKD (chronic kidney disease) stage 4, GFR 15-29 ml/min ([facility] N18.4    Bipolar 1 disorder, depressed ([facility]) F31.9    Primary osteoarthritis involving multiple joints M15.0    GERD

## 2018-10-05 DIAGNOSIS — I50.32 CHRONIC DIASTOLIC HEART FAILURE (HCC): Chronic | ICD-10-CM

## 2018-10-05 RX ORDER — ATORVASTATIN CALCIUM 40 MG/1
40 TABLET, FILM COATED ORAL NIGHTLY
Qty: 30 TABLET | Refills: 3 | Status: SHIPPED | OUTPATIENT
Start: 2018-10-05 | End: 2018-10-15

## 2018-10-15 ENCOUNTER — HOSPITAL ENCOUNTER (OUTPATIENT)
Age: 83
Setting detail: SPECIMEN
Discharge: HOME | End: 2018-10-15
Payer: COMMERCIAL

## 2018-10-15 ENCOUNTER — OFFICE VISIT (OUTPATIENT)
Dept: INTERNAL MEDICINE CLINIC | Age: 83
End: 2018-10-15
Payer: COMMERCIAL

## 2018-10-15 VITALS
HEIGHT: 59 IN | DIASTOLIC BLOOD PRESSURE: 74 MMHG | HEART RATE: 75 BPM | BODY MASS INDEX: 34.07 KG/M2 | WEIGHT: 169 LBS | SYSTOLIC BLOOD PRESSURE: 118 MMHG | OXYGEN SATURATION: 97 %

## 2018-10-15 DIAGNOSIS — F03.90 DEMENTIA WITHOUT BEHAVIORAL DISTURBANCE, UNSPECIFIED DEMENTIA TYPE: ICD-10-CM

## 2018-10-15 DIAGNOSIS — E03.9 HYPOTHYROIDISM, UNSPECIFIED TYPE: ICD-10-CM

## 2018-10-15 DIAGNOSIS — R13.10 DYSPHAGIA, UNSPECIFIED TYPE: Primary | ICD-10-CM

## 2018-10-15 DIAGNOSIS — N18.4 CKD (CHRONIC KIDNEY DISEASE) STAGE 4, GFR 15-29 ML/MIN (HCC): Chronic | ICD-10-CM

## 2018-10-15 DIAGNOSIS — I25.110 CORONARY ARTERY DISEASE INVOLVING NATIVE CORONARY ARTERY OF NATIVE HEART WITH UNSTABLE ANGINA PECTORIS (HCC): Chronic | ICD-10-CM

## 2018-10-15 LAB
FOLATE: >20 NG/ML
TSH SERPL DL<=0.05 MIU/L-ACNC: 1.38 MIU/L (ref 0.3–5)
VITAMIN B-12: 669 PG/ML (ref 232–1245)

## 2018-10-15 PROCEDURE — 99214 OFFICE O/P EST MOD 30 MIN: CPT | Performed by: INTERNAL MEDICINE

## 2018-10-15 RX ORDER — DONEPEZIL HYDROCHLORIDE 5 MG/1
5 TABLET, FILM COATED ORAL NIGHTLY
Qty: 30 TABLET | Refills: 3 | Status: SHIPPED | OUTPATIENT
Start: 2018-10-15 | End: 2018-11-26 | Stop reason: ALTCHOICE

## 2018-10-15 NOTE — PROGRESS NOTES
Subjective:      Patient ID: [place] is a 80 y.o. female. Visit Information    Have you changed or started any medications since your last visit including any over-the-counter medicines, vitamins, or herbal medicines? no   Are you having any side effects from any of your medications? -  no  Have you stopped taking any of your medications? Is so, why? -  no    Have you seen any other physician or provider since your last visit? No  Have you had any other diagnostic tests since your last visit? No  Have you been seen in the emergency room and/or had an admission to a hospital since we last saw you? No  Have you had your routine dental cleaning in the past 6 months? no    Have you activated your [employer] account? If not, what are your barriers? Yes     Patient Care Team:  Janelle Saha MD as PCP - Ana Pearson MD as PCP - S Attributed Provider  Asia Jj DO as Consulting Physician (Obstetrics & Gynecology)  Diana Kuhn MD as Consulting Physician (Nephrology)  Danial Davis MD as Consulting Physician (Gastroenterology)    Medical History Review  Past Medical, Family, and Social History reviewed and does not contribute to the patient presenting condition    Health Maintenance   Topic Date Due    Flu vaccine (1) 09/01/2018    DTaP/Tdap/Td vaccine (1 - Tdap) 08/30/2019 (Originally 9/29/1946)    Shingles Vaccine (1 of 2 - 2 Dose Series) 09/06/2019 (Originally 9/29/1977)    TSH testing  08/01/2019    Potassium monitoring  08/06/2019    Creatinine monitoring  08/06/2019    Pneumococcal high/highest risk  Completed       HPI - patient is here for evolution of multiple medical problems. She had chronic kidney disease, coronary artery disease. Her wound in leg has completely healed. She is still complaining of difficulty in swallowing, choking episodes. She had barium swallow, EGD, which was reported as negative.   She was seen by GI physician, she was advised to chew her for

## 2018-10-24 ENCOUNTER — TELEPHONE (OUTPATIENT)
Dept: INTERNAL MEDICINE CLINIC | Age: 83
End: 2018-10-24

## 2018-10-24 DIAGNOSIS — I50.32 CHRONIC DIASTOLIC HEART FAILURE (HCC): Chronic | ICD-10-CM

## 2018-10-24 RX ORDER — BUMETANIDE 1 MG/1
1 TABLET ORAL DAILY
Qty: 60 TABLET | Refills: 2 | Status: SHIPPED | OUTPATIENT
Start: 2018-10-24 | End: 2019-01-03

## 2018-10-24 NOTE — TELEPHONE ENCOUNTER
Aislinn from [facility] , will have cancel MRI for tomorrow due to incomplete chart notes.

## 2018-10-28 ASSESSMENT — ENCOUNTER SYMPTOMS
CONSTIPATION: 0
COUGH: 0
BLOOD IN STOOL: 0
CHEST TIGHTNESS: 0
ABDOMINAL PAIN: 0
ABDOMINAL DISTENTION: 0
EYE DISCHARGE: 0
COLOR CHANGE: 0
APNEA: 0
EYE PAIN: 0
CHOKING: 0
EYE REDNESS: 0
BACK PAIN: 0
SHORTNESS OF BREATH: 0
DIARRHEA: 0
EYE ITCHING: 0

## 2018-10-29 DIAGNOSIS — I50.32 CHRONIC DIASTOLIC HEART FAILURE (HCC): Chronic | ICD-10-CM

## 2018-10-29 RX ORDER — LEVOTHYROXINE SODIUM 0.1 MG/1
100 TABLET ORAL DAILY
Qty: 30 TABLET | Refills: 11 | Status: SHIPPED | OUTPATIENT
Start: 2018-10-29 | End: 2020-01-16 | Stop reason: SDUPTHER

## 2018-10-29 RX ORDER — ATORVASTATIN CALCIUM 40 MG/1
40 TABLET, FILM COATED ORAL NIGHTLY
Qty: 30 TABLET | Refills: 3 | Status: SHIPPED | OUTPATIENT
Start: 2018-10-29 | End: 2019-01-18

## 2018-11-02 DIAGNOSIS — I10 ESSENTIAL HYPERTENSION: Chronic | ICD-10-CM

## 2018-11-16 ENCOUNTER — TELEPHONE (OUTPATIENT)
Dept: INTERNAL MEDICINE CLINIC | Age: 83
End: 2018-11-16

## 2018-11-16 NOTE — TELEPHONE ENCOUNTER
pts dtr called to ck on the status of where things were at w/ mri of brain that was canceled in Oct d/t incomplete chart notes by Dr. Cookie Morales. Dtr states she thought Dr. Cookie Morales was going to do a peer to peer w/ ins co to get approval this wk. Dee Dee contact [phone] left a message for Guevara Salgado to call & advise where we are at with this matter.

## 2018-11-26 ENCOUNTER — OFFICE VISIT (OUTPATIENT)
Dept: INTERNAL MEDICINE CLINIC | Age: 83
End: 2018-11-26
Payer: COMMERCIAL

## 2018-11-26 VITALS
SYSTOLIC BLOOD PRESSURE: 122 MMHG | DIASTOLIC BLOOD PRESSURE: 78 MMHG | BODY MASS INDEX: 33.87 KG/M2 | HEIGHT: 59 IN | WEIGHT: 168 LBS

## 2018-11-26 DIAGNOSIS — G25.0 ESSENTIAL TREMOR: ICD-10-CM

## 2018-11-26 DIAGNOSIS — F03.90 DEMENTIA WITHOUT BEHAVIORAL DISTURBANCE, UNSPECIFIED DEMENTIA TYPE: ICD-10-CM

## 2018-11-26 DIAGNOSIS — Z23 NEED FOR VACCINATION: Primary | ICD-10-CM

## 2018-11-26 PROCEDURE — 99214 OFFICE O/P EST MOD 30 MIN: CPT | Performed by: INTERNAL MEDICINE

## 2018-11-26 PROCEDURE — G0008 ADMIN INFLUENZA VIRUS VAC: HCPCS | Performed by: INTERNAL MEDICINE

## 2018-11-26 PROCEDURE — 90662 IIV NO PRSV INCREASED AG IM: CPT | Performed by: INTERNAL MEDICINE

## 2018-11-26 RX ORDER — PRIMIDONE 50 MG/1
50 TABLET ORAL 3 TIMES DAILY
Qty: 90 TABLET | Refills: 3 | Status: SHIPPED | OUTPATIENT
Start: 2018-11-26 | End: 2019-04-24 | Stop reason: SDUPTHER

## 2018-11-26 NOTE — PROGRESS NOTES
DTaP/Tdap/Td vaccine (1 - Tdap) 08/30/2019 (Originally 9/29/1946)    Shingles Vaccine (1 of 2 - 2 Dose Series) 09/06/2019 (Originally 9/29/1977)    Potassium monitoring  08/06/2019    Creatinine monitoring  08/06/2019    TSH testing  10/15/2019    Flu vaccine  Completed    Pneumococcal high/highest risk  Completed     HPI- Patient is h here for evaluation of her forget fullness. She mentioned that she is very frustrated as she is forgetting things. 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 She was started on Aricept last visit, she is having side effects, she stopped it already. Patient noticed that she is having shaking in both of her hands. No chest pain, breathing problems. No other complaints  She is requesting a referral to a geriatrician for further workup. Review of Systems   Constitutional: Negative for activity change, appetite change, chills, diaphoresis, fatigue and fever. HENT: Negative for congestion, dental problem, drooling and ear discharge. Eyes: Negative for pain, discharge, redness and itching. Respiratory: Negative for apnea, cough, choking, chest tightness and shortness of breath. Cardiovascular: Negative for chest pain and leg swelling. Gastrointestinal: Negative for abdominal distention, abdominal pain, blood in stool, constipation and diarrhea. Endocrine: Negative for cold intolerance and heat intolerance. Genitourinary: Negative for difficulty urinating, dysuria, enuresis, flank pain and frequency. Musculoskeletal: Negative for arthralgias, back pain, gait problem and joint swelling. Having tremors    Skin: Negative for color change, pallor and rash. Neurological: Negative for dizziness, facial asymmetry, light-headedness, numbness and headaches.         Patient is felling that she forgets things    Psychiatric/Behavioral: Negative for agitation, behavioral problems, confusion, decreased

## 2018-12-05 ASSESSMENT — ENCOUNTER SYMPTOMS
EYE REDNESS: 0
DIARRHEA: 0
CHEST TIGHTNESS: 0
BLOOD IN STOOL: 0
SHORTNESS OF BREATH: 0
BACK PAIN: 0
ABDOMINAL DISTENTION: 0
EYE PAIN: 0
CHOKING: 0
APNEA: 0
COUGH: 0
EYE ITCHING: 0
EYE DISCHARGE: 0
CONSTIPATION: 0
ABDOMINAL PAIN: 0
COLOR CHANGE: 0

## 2018-12-10 RX ORDER — BUMETANIDE 2 MG/1
2 TABLET ORAL DAILY
Qty: 30 TABLET | Refills: 3 | Status: SHIPPED | OUTPATIENT
Start: 2018-12-10 | End: 2019-02-08

## 2019-01-03 ENCOUNTER — OFFICE VISIT (OUTPATIENT)
Dept: INTERNAL MEDICINE CLINIC | Age: 84
End: 2019-01-03
Payer: COMMERCIAL

## 2019-01-03 VITALS
SYSTOLIC BLOOD PRESSURE: 122 MMHG | DIASTOLIC BLOOD PRESSURE: 64 MMHG | OXYGEN SATURATION: 96 % | HEART RATE: 77 BPM | WEIGHT: 169 LBS | BODY MASS INDEX: 34.07 KG/M2 | HEIGHT: 59 IN

## 2019-01-03 DIAGNOSIS — N18.4 CKD (CHRONIC KIDNEY DISEASE), STAGE IV (HCC): ICD-10-CM

## 2019-01-03 DIAGNOSIS — I50.22 CHRONIC SYSTOLIC CONGESTIVE HEART FAILURE (HCC): ICD-10-CM

## 2019-01-03 DIAGNOSIS — R60.0 BILATERAL LEG EDEMA: ICD-10-CM

## 2019-01-03 DIAGNOSIS — I10 ESSENTIAL HYPERTENSION: Chronic | ICD-10-CM

## 2019-01-03 DIAGNOSIS — E03.9 ACQUIRED HYPOTHYROIDISM: Chronic | ICD-10-CM

## 2019-01-03 DIAGNOSIS — N18.30 CHRONIC KIDNEY DISEASE, STAGE III (MODERATE) (HCC): ICD-10-CM

## 2019-01-03 DIAGNOSIS — F31.9 BIPOLAR 1 DISORDER, DEPRESSED (HCC): Primary | Chronic | ICD-10-CM

## 2019-01-03 DIAGNOSIS — F31.31 BIPOLAR AFFECTIVE DISORDER, CURRENTLY DEPRESSED, MILD (HCC): ICD-10-CM

## 2019-01-03 DIAGNOSIS — J43.8 OTHER EMPHYSEMA (HCC): ICD-10-CM

## 2019-01-03 DIAGNOSIS — I50.32 CHRONIC DIASTOLIC HEART FAILURE (HCC): Chronic | ICD-10-CM

## 2019-01-03 DIAGNOSIS — E03.9 HYPOTHYROIDISM, UNSPECIFIED TYPE: ICD-10-CM

## 2019-01-03 DIAGNOSIS — I25.110 CORONARY ARTERY DISEASE INVOLVING NATIVE CORONARY ARTERY OF NATIVE HEART WITH UNSTABLE ANGINA PECTORIS (HCC): Chronic | ICD-10-CM

## 2019-01-03 DIAGNOSIS — R41.89 COGNITIVE IMPAIRMENT: ICD-10-CM

## 2019-01-03 DIAGNOSIS — N39.41 URGE INCONTINENCE OF URINE: ICD-10-CM

## 2019-01-03 PROCEDURE — 99214 OFFICE O/P EST MOD 30 MIN: CPT | Performed by: INTERNAL MEDICINE

## 2019-01-16 ENCOUNTER — HOSPITAL ENCOUNTER (EMERGENCY)
Age: 84
Discharge: HOME | End: 2019-01-16
Attending: EMERGENCY MEDICINE
Payer: COMMERCIAL

## 2019-01-16 ENCOUNTER — APPOINTMENT (OUTPATIENT)
Dept: GENERAL RADIOLOGY | Age: 84
End: 2019-01-16
Payer: COMMERCIAL

## 2019-01-16 VITALS
TEMPERATURE: 97.4 F | WEIGHT: 169 LBS | RESPIRATION RATE: 16 BRPM | DIASTOLIC BLOOD PRESSURE: 47 MMHG | OXYGEN SATURATION: 97 % | HEART RATE: 74 BPM | HEIGHT: 60 IN | BODY MASS INDEX: 33.18 KG/M2 | SYSTOLIC BLOOD PRESSURE: 122 MMHG

## 2019-01-16 DIAGNOSIS — M79.604 RIGHT LEG PAIN: Primary | ICD-10-CM

## 2019-01-16 LAB
ABSOLUTE EOS #: 0.3 K/UL (ref 0–0.4)
ABSOLUTE IMMATURE GRANULOCYTE: ABNORMAL K/UL (ref 0–0.3)
ABSOLUTE LYMPH #: 1.3 K/UL (ref 1–4.8)
ABSOLUTE MONO #: 0.6 K/UL (ref 0.1–1.3)
ALBUMIN SERPL-MCNC: 4.2 G/DL (ref 3.5–5.2)
ALBUMIN/GLOBULIN RATIO: ABNORMAL (ref 1–2.5)
ALP BLD-CCNC: 78 U/L (ref 35–104)
ALT SERPL-CCNC: 20 U/L (ref 5–33)
ANION GAP SERPL CALCULATED.3IONS-SCNC: 12 MMOL/L (ref 9–17)
AST SERPL-CCNC: 30 U/L
BASOPHILS # BLD: 1 % (ref 0–2)
BASOPHILS ABSOLUTE: 0.1 K/UL (ref 0–0.2)
BILIRUB SERPL-MCNC: 0.36 MG/DL (ref 0.3–1.2)
BUN BLDV-MCNC: 28 MG/DL (ref 8–23)
BUN/CREAT BLD: ABNORMAL (ref 9–20)
CALCIUM SERPL-MCNC: 9.2 MG/DL (ref 8.6–10.4)
CHLORIDE BLD-SCNC: 98 MMOL/L (ref 98–107)
CO2: 30 MMOL/L (ref 20–31)
CREAT SERPL-MCNC: 1.49 MG/DL (ref 0.5–0.9)
DIFFERENTIAL TYPE: ABNORMAL
EKG ATRIAL RATE: 70 BPM
EKG P AXIS: 52 DEGREES
EKG P-R INTERVAL: 152 MS
EKG Q-T INTERVAL: 442 MS
EKG QRS DURATION: 82 MS
EKG QTC CALCULATION (BAZETT): 477 MS
EKG R AXIS: 8 DEGREES
EKG T AXIS: 32 DEGREES
EKG VENTRICULAR RATE: 70 BPM
EOSINOPHILS RELATIVE PERCENT: 7 % (ref 0–4)
GFR AFRICAN AMERICAN: 40 ML/MIN
GFR NON-AFRICAN AMERICAN: 33 ML/MIN
GFR SERPL CREATININE-BSD FRML MDRD: ABNORMAL ML/MIN/{1.73_M2}
GFR SERPL CREATININE-BSD FRML MDRD: ABNORMAL ML/MIN/{1.73_M2}
GLUCOSE BLD-MCNC: 88 MG/DL (ref 70–99)
HCT VFR BLD CALC: 38 % (ref 36–46)
HEMOGLOBIN: 12.7 G/DL (ref 12–16)
IMMATURE GRANULOCYTES: ABNORMAL %
LYMPHOCYTES # BLD: 26 % (ref 24–44)
MCH RBC QN AUTO: 31.3 PG (ref 26–34)
MCHC RBC AUTO-ENTMCNC: 33.4 G/DL (ref 31–37)
MCV RBC AUTO: 93.7 FL (ref 80–100)
MONOCYTES # BLD: 12 % (ref 1–7)
NRBC AUTOMATED: ABNORMAL PER 100 WBC
PDW BLD-RTO: 14 % (ref 11.5–14.9)
PLATELET # BLD: 215 K/UL (ref 150–450)
PLATELET ESTIMATE: ABNORMAL
PMV BLD AUTO: 8.6 FL (ref 6–12)
POTASSIUM SERPL-SCNC: 4 MMOL/L (ref 3.7–5.3)
RBC # BLD: 4.05 M/UL (ref 4–5.2)
RBC # BLD: ABNORMAL 10*6/UL
SEG NEUTROPHILS: 54 % (ref 36–66)
SEGMENTED NEUTROPHILS ABSOLUTE COUNT: 2.7 K/UL (ref 1.3–9.1)
SODIUM BLD-SCNC: 140 MMOL/L (ref 135–144)
TOTAL PROTEIN: 6.8 G/DL (ref 6.4–8.3)
WBC # BLD: 5 K/UL (ref 3.5–11)
WBC # BLD: ABNORMAL 10*3/UL

## 2019-01-16 PROCEDURE — 36415 COLL VENOUS BLD VENIPUNCTURE: CPT

## 2019-01-16 PROCEDURE — 83880 ASSAY OF NATRIURETIC PEPTIDE: CPT

## 2019-01-16 PROCEDURE — 99284 EMERGENCY DEPT VISIT MOD MDM: CPT

## 2019-01-16 PROCEDURE — 71046 X-RAY EXAM CHEST 2 VIEWS: CPT

## 2019-01-16 PROCEDURE — 73502 X-RAY EXAM HIP UNI 2-3 VIEWS: CPT

## 2019-01-16 PROCEDURE — 6370000000 HC RX 637 (ALT 250 FOR IP): Performed by: NURSE PRACTITIONER

## 2019-01-16 PROCEDURE — 85025 COMPLETE CBC W/AUTO DIFF WBC: CPT

## 2019-01-16 PROCEDURE — 80053 COMPREHEN METABOLIC PANEL: CPT

## 2019-01-16 PROCEDURE — 93005 ELECTROCARDIOGRAM TRACING: CPT

## 2019-01-16 PROCEDURE — 93971 EXTREMITY STUDY: CPT

## 2019-01-16 RX ORDER — TRAMADOL HYDROCHLORIDE 50 MG/1
50 TABLET ORAL EVERY 6 HOURS PRN
Qty: 20 TABLET | Refills: 0 | Status: SHIPPED | OUTPATIENT
Start: 2019-01-16 | End: 2019-01-19

## 2019-01-16 RX ORDER — TRAMADOL HYDROCHLORIDE 50 MG/1
50 TABLET ORAL ONCE
Status: COMPLETED | OUTPATIENT
Start: 2019-01-16 | End: 2019-01-16

## 2019-01-16 RX ADMIN — TRAMADOL HYDROCHLORIDE 50 MG: 50 TABLET, FILM COATED ORAL at 17:01

## 2019-01-16 ASSESSMENT — ENCOUNTER SYMPTOMS
COUGH: 0
TROUBLE SWALLOWING: 0
VOMITING: 0
SHORTNESS OF BREATH: 0
NAUSEA: 0

## 2019-01-16 ASSESSMENT — PAIN SCALES - GENERAL: PAINLEVEL_OUTOF10: 5

## 2019-01-17 ENCOUNTER — CARE COORDINATION (OUTPATIENT)
Dept: CARE COORDINATION | Age: 84
End: 2019-01-17

## 2019-01-17 LAB
BNP INTERPRETATION: ABNORMAL
PRO-BNP: 601 PG/ML

## 2019-01-18 ENCOUNTER — OFFICE VISIT (OUTPATIENT)
Dept: INTERNAL MEDICINE CLINIC | Age: 84
End: 2019-01-18
Payer: COMMERCIAL

## 2019-01-18 VITALS
HEIGHT: 60 IN | SYSTOLIC BLOOD PRESSURE: 112 MMHG | DIASTOLIC BLOOD PRESSURE: 70 MMHG | BODY MASS INDEX: 33.01 KG/M2 | RESPIRATION RATE: 16 BRPM

## 2019-01-18 DIAGNOSIS — M17.11 ARTHRITIS OF RIGHT KNEE: Primary | ICD-10-CM

## 2019-01-18 PROCEDURE — 99213 OFFICE O/P EST LOW 20 MIN: CPT | Performed by: INTERNAL MEDICINE

## 2019-01-18 RX ORDER — FAMOTIDINE 20 MG/1
TABLET, FILM COATED ORAL
Refills: 8 | COMMUNITY
Start: 2019-01-10 | End: 2019-02-13

## 2019-01-18 RX ORDER — DONEPEZIL HYDROCHLORIDE 5 MG/1
TABLET, FILM COATED ORAL
Refills: 3 | COMMUNITY
Start: 2019-01-10 | End: 2019-01-25 | Stop reason: ALTCHOICE

## 2019-01-18 RX ORDER — PREDNISONE 20 MG/1
20 TABLET ORAL DAILY
Qty: 7 TABLET | Refills: 0 | Status: SHIPPED | OUTPATIENT
Start: 2019-01-18 | End: 2019-01-25

## 2019-01-18 ASSESSMENT — PATIENT HEALTH QUESTIONNAIRE - PHQ9
1. LITTLE INTEREST OR PLEASURE IN DOING THINGS: 0
SUM OF ALL RESPONSES TO PHQ9 QUESTIONS 1 & 2: 0
2. FEELING DOWN, DEPRESSED OR HOPELESS: 0
SUM OF ALL RESPONSES TO PHQ QUESTIONS 1-9: 0
SUM OF ALL RESPONSES TO PHQ QUESTIONS 1-9: 0

## 2019-01-25 ENCOUNTER — OFFICE VISIT (OUTPATIENT)
Dept: INTERNAL MEDICINE CLINIC | Age: 84
End: 2019-01-25
Payer: COMMERCIAL

## 2019-01-25 VITALS
DIASTOLIC BLOOD PRESSURE: 52 MMHG | HEIGHT: 60 IN | HEART RATE: 74 BPM | OXYGEN SATURATION: 98 % | BODY MASS INDEX: 33.96 KG/M2 | SYSTOLIC BLOOD PRESSURE: 120 MMHG | WEIGHT: 173 LBS

## 2019-01-25 DIAGNOSIS — I50.32 CHRONIC DIASTOLIC HEART FAILURE (HCC): Chronic | ICD-10-CM

## 2019-01-25 DIAGNOSIS — N18.4 CKD (CHRONIC KIDNEY DISEASE) STAGE 4, GFR 15-29 ML/MIN (HCC): Primary | Chronic | ICD-10-CM

## 2019-01-25 DIAGNOSIS — F32.A DEPRESSION, UNSPECIFIED DEPRESSION TYPE: Chronic | ICD-10-CM

## 2019-01-25 DIAGNOSIS — J32.9 CHRONIC SINUSITIS, UNSPECIFIED LOCATION: ICD-10-CM

## 2019-01-25 DIAGNOSIS — E03.9 ACQUIRED HYPOTHYROIDISM: Chronic | ICD-10-CM

## 2019-01-25 PROCEDURE — 99214 OFFICE O/P EST MOD 30 MIN: CPT | Performed by: INTERNAL MEDICINE

## 2019-01-25 RX ORDER — ATORVASTATIN CALCIUM 40 MG/1
1 TABLET, FILM COATED ORAL NIGHTLY
Refills: 3 | COMMUNITY
Start: 2019-01-23 | End: 2019-03-05

## 2019-01-25 RX ORDER — FLUTICASONE PROPIONATE 50 MCG
1 SPRAY, SUSPENSION (ML) NASAL DAILY
Qty: 2 BOTTLE | Refills: 1 | Status: SHIPPED | OUTPATIENT
Start: 2019-01-25

## 2019-01-25 ASSESSMENT — ENCOUNTER SYMPTOMS
RHINORRHEA: 1
CONSTIPATION: 0
EYE ITCHING: 0
EYE PAIN: 0
CHEST TIGHTNESS: 0
COUGH: 0
DIARRHEA: 0
ABDOMINAL PAIN: 0
EYE REDNESS: 0
CHOKING: 0
COLOR CHANGE: 0
SHORTNESS OF BREATH: 0
BLOOD IN STOOL: 0
ABDOMINAL DISTENTION: 0
APNEA: 0
BACK PAIN: 0
EYE DISCHARGE: 0

## 2019-02-06 DIAGNOSIS — I50.32 CHRONIC DIASTOLIC HEART FAILURE (HCC): Chronic | ICD-10-CM

## 2019-02-08 ENCOUNTER — HOSPITAL ENCOUNTER (OUTPATIENT)
Age: 84
Setting detail: SPECIMEN
Discharge: HOME | End: 2019-02-08
Payer: COMMERCIAL

## 2019-02-08 DIAGNOSIS — N18.4 CKD (CHRONIC KIDNEY DISEASE) STAGE 4, GFR 15-29 ML/MIN (HCC): Chronic | ICD-10-CM

## 2019-02-08 DIAGNOSIS — Z79.899 MEDICATION DOSE CHANGED: ICD-10-CM

## 2019-02-08 LAB
ANION GAP SERPL CALCULATED.3IONS-SCNC: 14 MMOL/L (ref 9–17)
BUN BLDV-MCNC: 25 MG/DL (ref 8–23)
BUN/CREAT BLD: ABNORMAL (ref 9–20)
CALCIUM SERPL-MCNC: 9.1 MG/DL (ref 8.6–10.4)
CHLORIDE BLD-SCNC: 98 MMOL/L (ref 98–107)
CO2: 27 MMOL/L (ref 20–31)
CREAT SERPL-MCNC: 1.57 MG/DL (ref 0.5–0.9)
CREATININE URINE: 40.9 MG/DL (ref 28–217)
GFR AFRICAN AMERICAN: 37 ML/MIN
GFR NON-AFRICAN AMERICAN: 31 ML/MIN
GFR SERPL CREATININE-BSD FRML MDRD: ABNORMAL ML/MIN/{1.73_M2}
GFR SERPL CREATININE-BSD FRML MDRD: ABNORMAL ML/MIN/{1.73_M2}
GLUCOSE BLD-MCNC: 93 MG/DL (ref 70–99)
POTASSIUM SERPL-SCNC: 4 MMOL/L (ref 3.7–5.3)
SODIUM BLD-SCNC: 139 MMOL/L (ref 135–144)
TOTAL PROTEIN, URINE: 5 MG/DL

## 2019-02-08 RX ORDER — BUMETANIDE 1 MG/1
1 TABLET ORAL DAILY
Qty: 60 TABLET | Refills: 2 | Status: SHIPPED | OUTPATIENT
Start: 2019-02-08 | End: 2019-03-04

## 2019-02-13 ENCOUNTER — HOSPITAL ENCOUNTER (INPATIENT)
Age: 84
LOS: 1 days | Discharge: HOME HEALTH | DRG: 291 | End: 2019-02-15
Attending: EMERGENCY MEDICINE | Admitting: INTERNAL MEDICINE
Payer: COMMERCIAL

## 2019-02-13 ENCOUNTER — APPOINTMENT (OUTPATIENT)
Dept: GENERAL RADIOLOGY | Age: 84
DRG: 291 | End: 2019-02-13
Payer: COMMERCIAL

## 2019-02-13 DIAGNOSIS — I50.33 ACUTE ON CHRONIC DIASTOLIC CONGESTIVE HEART FAILURE (HCC): Primary | ICD-10-CM

## 2019-02-13 PROBLEM — I50.43 CHF (CONGESTIVE HEART FAILURE), NYHA CLASS I, ACUTE ON CHRONIC, COMBINED (HCC): Status: ACTIVE | Noted: 2019-02-13

## 2019-02-13 LAB
ALBUMIN SERPL-MCNC: 4 G/DL (ref 3.5–5.2)
ALBUMIN/GLOBULIN RATIO: ABNORMAL (ref 1–2.5)
ALP BLD-CCNC: 68 U/L (ref 35–104)
ALT SERPL-CCNC: 20 U/L (ref 5–33)
ANION GAP SERPL CALCULATED.3IONS-SCNC: 9 MMOL/L (ref 9–17)
AST SERPL-CCNC: 27 U/L
BILIRUB SERPL-MCNC: 0.27 MG/DL (ref 0.3–1.2)
BNP INTERPRETATION: ABNORMAL
BUN BLDV-MCNC: 28 MG/DL (ref 8–23)
BUN/CREAT BLD: ABNORMAL (ref 9–20)
CALCIUM SERPL-MCNC: 9 MG/DL (ref 8.6–10.4)
CHLORIDE BLD-SCNC: 100 MMOL/L (ref 98–107)
CO2: 29 MMOL/L (ref 20–31)
CREAT SERPL-MCNC: 1.45 MG/DL (ref 0.5–0.9)
GFR AFRICAN AMERICAN: 41 ML/MIN
GFR NON-AFRICAN AMERICAN: 34 ML/MIN
GFR SERPL CREATININE-BSD FRML MDRD: ABNORMAL ML/MIN/{1.73_M2}
GFR SERPL CREATININE-BSD FRML MDRD: ABNORMAL ML/MIN/{1.73_M2}
GLUCOSE BLD-MCNC: 101 MG/DL (ref 70–99)
HCT VFR BLD CALC: 36.1 % (ref 36–46)
HEMOGLOBIN: 12 G/DL (ref 12–16)
MCH RBC QN AUTO: 31.7 PG (ref 26–34)
MCHC RBC AUTO-ENTMCNC: 33.3 G/DL (ref 31–37)
MCV RBC AUTO: 95.2 FL (ref 80–100)
NRBC AUTOMATED: ABNORMAL PER 100 WBC
PDW BLD-RTO: 14.6 % (ref 11.5–14.9)
PLATELET # BLD: 214 K/UL (ref 150–450)
PMV BLD AUTO: 8.1 FL (ref 6–12)
POTASSIUM SERPL-SCNC: 4.3 MMOL/L (ref 3.7–5.3)
PRO-BNP: 362 PG/ML
RBC # BLD: 3.79 M/UL (ref 4–5.2)
SODIUM BLD-SCNC: 138 MMOL/L (ref 135–144)
TOTAL PROTEIN: 6.3 G/DL (ref 6.4–8.3)
TROPONIN INTERP: ABNORMAL
TROPONIN INTERP: ABNORMAL
TROPONIN T: ABNORMAL NG/ML
TROPONIN T: ABNORMAL NG/ML
TROPONIN, HIGH SENSITIVITY: 18 NG/L (ref 0–14)
TROPONIN, HIGH SENSITIVITY: 19 NG/L (ref 0–14)
TSH SERPL DL<=0.05 MIU/L-ACNC: 4.26 MIU/L (ref 0.3–5)
WBC # BLD: 4.4 K/UL (ref 3.5–11)

## 2019-02-13 PROCEDURE — G0378 HOSPITAL OBSERVATION PER HR: HCPCS

## 2019-02-13 PROCEDURE — 85027 COMPLETE CBC AUTOMATED: CPT

## 2019-02-13 PROCEDURE — 83880 ASSAY OF NATRIURETIC PEPTIDE: CPT

## 2019-02-13 PROCEDURE — 96374 THER/PROPH/DIAG INJ IV PUSH: CPT

## 2019-02-13 PROCEDURE — 6370000000 HC RX 637 (ALT 250 FOR IP): Performed by: STUDENT IN AN ORGANIZED HEALTH CARE EDUCATION/TRAINING PROGRAM

## 2019-02-13 PROCEDURE — 84443 ASSAY THYROID STIM HORMONE: CPT

## 2019-02-13 PROCEDURE — 6360000002 HC RX W HCPCS: Performed by: STUDENT IN AN ORGANIZED HEALTH CARE EDUCATION/TRAINING PROGRAM

## 2019-02-13 PROCEDURE — 99285 EMERGENCY DEPT VISIT HI MDM: CPT

## 2019-02-13 PROCEDURE — 2580000003 HC RX 258: Performed by: STUDENT IN AN ORGANIZED HEALTH CARE EDUCATION/TRAINING PROGRAM

## 2019-02-13 PROCEDURE — 2500000003 HC RX 250 WO HCPCS: Performed by: STUDENT IN AN ORGANIZED HEALTH CARE EDUCATION/TRAINING PROGRAM

## 2019-02-13 PROCEDURE — 84484 ASSAY OF TROPONIN QUANT: CPT

## 2019-02-13 PROCEDURE — 99223 1ST HOSP IP/OBS HIGH 75: CPT | Performed by: INTERNAL MEDICINE

## 2019-02-13 PROCEDURE — 80053 COMPREHEN METABOLIC PANEL: CPT

## 2019-02-13 PROCEDURE — 71046 X-RAY EXAM CHEST 2 VIEWS: CPT

## 2019-02-13 PROCEDURE — 36415 COLL VENOUS BLD VENIPUNCTURE: CPT

## 2019-02-13 RX ORDER — BUMETANIDE 1 MG/1
1 TABLET ORAL DAILY
Status: DISCONTINUED | OUTPATIENT
Start: 2019-02-13 | End: 2019-02-13

## 2019-02-13 RX ORDER — M-VIT,TX,IRON,MINS/CALC/FOLIC 27MG-0.4MG
1 TABLET ORAL DAILY
Status: DISCONTINUED | OUTPATIENT
Start: 2019-02-13 | End: 2019-02-13

## 2019-02-13 RX ORDER — DOCUSATE SODIUM 100 MG/1
100 CAPSULE, LIQUID FILLED ORAL DAILY
Status: DISCONTINUED | OUTPATIENT
Start: 2019-02-13 | End: 2019-02-13

## 2019-02-13 RX ORDER — RANOLAZINE 500 MG/1
500 TABLET, EXTENDED RELEASE ORAL 2 TIMES DAILY
Status: DISCONTINUED | OUTPATIENT
Start: 2019-02-13 | End: 2019-02-13

## 2019-02-13 RX ORDER — LEVOTHYROXINE SODIUM 0.1 MG/1
100 TABLET ORAL DAILY
Status: DISCONTINUED | OUTPATIENT
Start: 2019-02-14 | End: 2019-02-15 | Stop reason: HOSPADM

## 2019-02-13 RX ORDER — SERTRALINE HYDROCHLORIDE 100 MG/1
100 TABLET, FILM COATED ORAL DAILY
Status: DISCONTINUED | OUTPATIENT
Start: 2019-02-14 | End: 2019-02-15 | Stop reason: HOSPADM

## 2019-02-13 RX ORDER — DOCUSATE SODIUM 100 MG/1
100 CAPSULE, LIQUID FILLED ORAL DAILY
Status: DISCONTINUED | OUTPATIENT
Start: 2019-02-14 | End: 2019-02-15 | Stop reason: HOSPADM

## 2019-02-13 RX ORDER — ASPIRIN 81 MG/1
81 TABLET ORAL DAILY
Status: DISCONTINUED | OUTPATIENT
Start: 2019-02-14 | End: 2019-02-15 | Stop reason: HOSPADM

## 2019-02-13 RX ORDER — FAMOTIDINE 20 MG/1
20 TABLET, FILM COATED ORAL 2 TIMES DAILY
Status: DISCONTINUED | OUTPATIENT
Start: 2019-02-13 | End: 2019-02-15

## 2019-02-13 RX ORDER — PRIMIDONE 50 MG/1
50 TABLET ORAL 3 TIMES DAILY
Status: DISCONTINUED | OUTPATIENT
Start: 2019-02-13 | End: 2019-02-15 | Stop reason: HOSPADM

## 2019-02-13 RX ORDER — ATORVASTATIN CALCIUM 40 MG/1
40 TABLET, FILM COATED ORAL NIGHTLY
Status: DISCONTINUED | OUTPATIENT
Start: 2019-02-13 | End: 2019-02-15 | Stop reason: HOSPADM

## 2019-02-13 RX ORDER — ONDANSETRON 2 MG/ML
4 INJECTION INTRAMUSCULAR; INTRAVENOUS EVERY 6 HOURS PRN
Status: DISCONTINUED | OUTPATIENT
Start: 2019-02-13 | End: 2019-02-15 | Stop reason: HOSPADM

## 2019-02-13 RX ORDER — SERTRALINE HYDROCHLORIDE 100 MG/1
100 TABLET, FILM COATED ORAL DAILY
Status: DISCONTINUED | OUTPATIENT
Start: 2019-02-13 | End: 2019-02-13

## 2019-02-13 RX ORDER — BUMETANIDE 0.25 MG/ML
2 INJECTION, SOLUTION INTRAMUSCULAR; INTRAVENOUS DAILY
Status: DISCONTINUED | OUTPATIENT
Start: 2019-02-13 | End: 2019-02-15 | Stop reason: HOSPADM

## 2019-02-13 RX ORDER — LEVOTHYROXINE SODIUM 0.1 MG/1
100 TABLET ORAL DAILY
Status: DISCONTINUED | OUTPATIENT
Start: 2019-02-13 | End: 2019-02-13

## 2019-02-13 RX ORDER — ATORVASTATIN CALCIUM 40 MG/1
40 TABLET, FILM COATED ORAL NIGHTLY
Status: DISCONTINUED | OUTPATIENT
Start: 2019-02-13 | End: 2019-02-13

## 2019-02-13 RX ORDER — SODIUM CHLORIDE 0.9 % (FLUSH) 0.9 %
10 SYRINGE (ML) INJECTION EVERY 12 HOURS SCHEDULED
Status: DISCONTINUED | OUTPATIENT
Start: 2019-02-13 | End: 2019-02-15 | Stop reason: HOSPADM

## 2019-02-13 RX ORDER — NITROGLYCERIN 0.4 MG/1
0.4 TABLET SUBLINGUAL EVERY 5 MIN PRN
Status: DISCONTINUED | OUTPATIENT
Start: 2019-02-13 | End: 2019-02-15 | Stop reason: HOSPADM

## 2019-02-13 RX ORDER — PRIMIDONE 50 MG/1
50 TABLET ORAL 3 TIMES DAILY
Status: DISCONTINUED | OUTPATIENT
Start: 2019-02-13 | End: 2019-02-13

## 2019-02-13 RX ORDER — ASPIRIN 81 MG/1
81 TABLET ORAL DAILY
Status: DISCONTINUED | OUTPATIENT
Start: 2019-02-13 | End: 2019-02-13

## 2019-02-13 RX ORDER — RANOLAZINE 500 MG/1
500 TABLET, EXTENDED RELEASE ORAL 2 TIMES DAILY
Status: DISCONTINUED | OUTPATIENT
Start: 2019-02-13 | End: 2019-02-15 | Stop reason: HOSPADM

## 2019-02-13 RX ORDER — MULTIVIT,IRON,MINERALS/LUTEIN
1 TABLET ORAL DAILY
Status: DISCONTINUED | OUTPATIENT
Start: 2019-02-14 | End: 2019-02-15 | Stop reason: HOSPADM

## 2019-02-13 RX ORDER — ACETAMINOPHEN 325 MG/1
650 TABLET ORAL EVERY 4 HOURS PRN
Status: DISCONTINUED | OUTPATIENT
Start: 2019-02-13 | End: 2019-02-15 | Stop reason: HOSPADM

## 2019-02-13 RX ORDER — SODIUM CHLORIDE 0.9 % (FLUSH) 0.9 %
10 SYRINGE (ML) INJECTION PRN
Status: DISCONTINUED | OUTPATIENT
Start: 2019-02-13 | End: 2019-02-15 | Stop reason: HOSPADM

## 2019-02-13 RX ADMIN — PRIMIDONE 50 MG: 50 TABLET ORAL at 21:11

## 2019-02-13 RX ADMIN — Medication 10 ML: at 21:15

## 2019-02-13 RX ADMIN — BUMETANIDE 2 MG: 0.25 INJECTION INTRAMUSCULAR; INTRAVENOUS at 21:03

## 2019-02-13 RX ADMIN — ATORVASTATIN CALCIUM 40 MG: 40 TABLET, FILM COATED ORAL at 21:11

## 2019-02-13 RX ADMIN — FAMOTIDINE 20 MG: 20 TABLET, FILM COATED ORAL at 21:01

## 2019-02-13 RX ADMIN — RANOLAZINE 500 MG: 500 TABLET, EXTENDED RELEASE ORAL at 21:11

## 2019-02-13 ASSESSMENT — PAIN DESCRIPTION - ORIENTATION: ORIENTATION: RIGHT;LEFT

## 2019-02-13 ASSESSMENT — PAIN SCALES - GENERAL
PAINLEVEL_OUTOF10: 2
PAINLEVEL_OUTOF10: 2

## 2019-02-13 ASSESSMENT — ENCOUNTER SYMPTOMS
SHORTNESS OF BREATH: 1
COUGH: 0
NAUSEA: 0
CHEST TIGHTNESS: 0
ABDOMINAL PAIN: 0
ABDOMINAL DISTENTION: 0
WHEEZING: 0
DIARRHEA: 0
VOMITING: 0
CHOKING: 0
BACK PAIN: 0

## 2019-02-13 ASSESSMENT — PAIN DESCRIPTION - LOCATION
LOCATION: LEG
LOCATION: LEG

## 2019-02-13 ASSESSMENT — PAIN DESCRIPTION - PAIN TYPE
TYPE: ACUTE PAIN
TYPE: ACUTE PAIN

## 2019-02-14 ENCOUNTER — APPOINTMENT (OUTPATIENT)
Dept: GENERAL RADIOLOGY | Age: 84
DRG: 291 | End: 2019-02-14
Payer: COMMERCIAL

## 2019-02-14 LAB
ABSOLUTE EOS #: 0.3 K/UL (ref 0–0.4)
ABSOLUTE IMMATURE GRANULOCYTE: ABNORMAL K/UL (ref 0–0.3)
ABSOLUTE LYMPH #: 1.3 K/UL (ref 1–4.8)
ABSOLUTE MONO #: 0.4 K/UL (ref 0.1–1.3)
ANION GAP SERPL CALCULATED.3IONS-SCNC: 10 MMOL/L (ref 9–17)
BASOPHILS # BLD: 1 % (ref 0–2)
BASOPHILS ABSOLUTE: 0 K/UL (ref 0–0.2)
BUN BLDV-MCNC: 25 MG/DL (ref 8–23)
BUN/CREAT BLD: ABNORMAL (ref 9–20)
CALCIUM SERPL-MCNC: 8.8 MG/DL (ref 8.6–10.4)
CHLORIDE BLD-SCNC: 96 MMOL/L (ref 98–107)
CO2: 28 MMOL/L (ref 20–31)
CREAT SERPL-MCNC: 1.44 MG/DL (ref 0.5–0.9)
DIFFERENTIAL TYPE: ABNORMAL
EKG ATRIAL RATE: 75 BPM
EKG P AXIS: 53 DEGREES
EKG P-R INTERVAL: 140 MS
EKG Q-T INTERVAL: 414 MS
EKG QRS DURATION: 82 MS
EKG QTC CALCULATION (BAZETT): 462 MS
EKG R AXIS: 4 DEGREES
EKG T AXIS: 43 DEGREES
EKG VENTRICULAR RATE: 75 BPM
EOSINOPHILS RELATIVE PERCENT: 7 % (ref 0–4)
GFR AFRICAN AMERICAN: 41 ML/MIN
GFR NON-AFRICAN AMERICAN: 34 ML/MIN
GFR SERPL CREATININE-BSD FRML MDRD: ABNORMAL ML/MIN/{1.73_M2}
GFR SERPL CREATININE-BSD FRML MDRD: ABNORMAL ML/MIN/{1.73_M2}
GLUCOSE BLD-MCNC: 93 MG/DL (ref 70–99)
HCT VFR BLD CALC: 36.8 % (ref 36–46)
HEMOGLOBIN: 12.2 G/DL (ref 12–16)
IMMATURE GRANULOCYTES: ABNORMAL %
LV EF: 65 %
LVEF MODALITY: NORMAL
LYMPHOCYTES # BLD: 28 % (ref 24–44)
MCH RBC QN AUTO: 31.6 PG (ref 26–34)
MCHC RBC AUTO-ENTMCNC: 33.2 G/DL (ref 31–37)
MCV RBC AUTO: 95.1 FL (ref 80–100)
MONOCYTES # BLD: 10 % (ref 1–7)
NRBC AUTOMATED: ABNORMAL PER 100 WBC
PDW BLD-RTO: 14.6 % (ref 11.5–14.9)
PLATELET # BLD: 223 K/UL (ref 150–450)
PLATELET ESTIMATE: ABNORMAL
PMV BLD AUTO: 8.2 FL (ref 6–12)
POTASSIUM SERPL-SCNC: 4.5 MMOL/L (ref 3.7–5.3)
RBC # BLD: 3.87 M/UL (ref 4–5.2)
RBC # BLD: ABNORMAL 10*6/UL
SEG NEUTROPHILS: 54 % (ref 36–66)
SEGMENTED NEUTROPHILS ABSOLUTE COUNT: 2.4 K/UL (ref 1.3–9.1)
SODIUM BLD-SCNC: 134 MMOL/L (ref 135–144)
WBC # BLD: 4.4 K/UL (ref 3.5–11)
WBC # BLD: ABNORMAL 10*3/UL

## 2019-02-14 PROCEDURE — 85025 COMPLETE CBC W/AUTO DIFF WBC: CPT

## 2019-02-14 PROCEDURE — 71046 X-RAY EXAM CHEST 2 VIEWS: CPT

## 2019-02-14 PROCEDURE — 2580000003 HC RX 258: Performed by: STUDENT IN AN ORGANIZED HEALTH CARE EDUCATION/TRAINING PROGRAM

## 2019-02-14 PROCEDURE — 2500000003 HC RX 250 WO HCPCS: Performed by: STUDENT IN AN ORGANIZED HEALTH CARE EDUCATION/TRAINING PROGRAM

## 2019-02-14 PROCEDURE — 6370000000 HC RX 637 (ALT 250 FOR IP): Performed by: STUDENT IN AN ORGANIZED HEALTH CARE EDUCATION/TRAINING PROGRAM

## 2019-02-14 PROCEDURE — 96376 TX/PRO/DX INJ SAME DRUG ADON: CPT

## 2019-02-14 PROCEDURE — 93306 TTE W/DOPPLER COMPLETE: CPT

## 2019-02-14 PROCEDURE — 80048 BASIC METABOLIC PNL TOTAL CA: CPT

## 2019-02-14 PROCEDURE — 36415 COLL VENOUS BLD VENIPUNCTURE: CPT

## 2019-02-14 PROCEDURE — 93005 ELECTROCARDIOGRAM TRACING: CPT

## 2019-02-14 PROCEDURE — 1200000000 HC SEMI PRIVATE

## 2019-02-14 RX ADMIN — ACETAMINOPHEN 650 MG: 325 TABLET, FILM COATED ORAL at 06:24

## 2019-02-14 RX ADMIN — ATORVASTATIN CALCIUM 40 MG: 40 TABLET, FILM COATED ORAL at 20:31

## 2019-02-14 RX ADMIN — LEVOTHYROXINE SODIUM 100 MCG: 0.1 TABLET ORAL at 06:24

## 2019-02-14 RX ADMIN — ASPIRIN 81 MG: 81 TABLET ORAL at 08:08

## 2019-02-14 RX ADMIN — Medication 10 ML: at 10:33

## 2019-02-14 RX ADMIN — BUMETANIDE 2 MG: 0.25 INJECTION INTRAMUSCULAR; INTRAVENOUS at 10:33

## 2019-02-14 RX ADMIN — ACETAMINOPHEN 650 MG: 325 TABLET, FILM COATED ORAL at 12:12

## 2019-02-14 RX ADMIN — DOCUSATE SODIUM 100 MG: 100 CAPSULE, LIQUID FILLED ORAL at 08:10

## 2019-02-14 RX ADMIN — Medication 12.5 MG: at 08:09

## 2019-02-14 RX ADMIN — FAMOTIDINE 20 MG: 20 TABLET, FILM COATED ORAL at 08:05

## 2019-02-14 RX ADMIN — PRIMIDONE 50 MG: 50 TABLET ORAL at 20:31

## 2019-02-14 RX ADMIN — RANOLAZINE 500 MG: 500 TABLET, EXTENDED RELEASE ORAL at 20:31

## 2019-02-14 RX ADMIN — Medication 1 TABLET: at 08:11

## 2019-02-14 RX ADMIN — PRIMIDONE 50 MG: 50 TABLET ORAL at 08:08

## 2019-02-14 RX ADMIN — FAMOTIDINE 20 MG: 20 TABLET, FILM COATED ORAL at 20:31

## 2019-02-14 RX ADMIN — RANOLAZINE 500 MG: 500 TABLET, EXTENDED RELEASE ORAL at 08:11

## 2019-02-14 RX ADMIN — Medication 10 ML: at 20:31

## 2019-02-14 RX ADMIN — SERTRALINE HYDROCHLORIDE 100 MG: 100 TABLET, FILM COATED ORAL at 08:12

## 2019-02-14 ASSESSMENT — PAIN SCALES - GENERAL
PAINLEVEL_OUTOF10: 7
PAINLEVEL_OUTOF10: 1
PAINLEVEL_OUTOF10: 3
PAINLEVEL_OUTOF10: 2

## 2019-02-14 ASSESSMENT — PAIN DESCRIPTION - ORIENTATION: ORIENTATION: RIGHT;LEFT

## 2019-02-14 ASSESSMENT — PAIN DESCRIPTION - DESCRIPTORS: DESCRIPTORS: ACHING

## 2019-02-14 ASSESSMENT — PAIN DESCRIPTION - PAIN TYPE: TYPE: CHRONIC PAIN

## 2019-02-14 ASSESSMENT — PAIN DESCRIPTION - FREQUENCY: FREQUENCY: INTERMITTENT

## 2019-02-14 ASSESSMENT — PAIN DESCRIPTION - LOCATION: LOCATION: HEAD;GENERALIZED

## 2019-02-15 VITALS
RESPIRATION RATE: 16 BRPM | BODY MASS INDEX: 35.56 KG/M2 | DIASTOLIC BLOOD PRESSURE: 42 MMHG | WEIGHT: 176.37 LBS | TEMPERATURE: 97.7 F | HEART RATE: 69 BPM | HEIGHT: 59 IN | SYSTOLIC BLOOD PRESSURE: 106 MMHG | OXYGEN SATURATION: 96 %

## 2019-02-15 LAB
ABSOLUTE EOS #: 0.3 K/UL (ref 0–0.4)
ABSOLUTE IMMATURE GRANULOCYTE: ABNORMAL K/UL (ref 0–0.3)
ABSOLUTE LYMPH #: 1.3 K/UL (ref 1–4.8)
ABSOLUTE MONO #: 0.3 K/UL (ref 0.1–1.3)
ANION GAP SERPL CALCULATED.3IONS-SCNC: 10 MMOL/L (ref 9–17)
BASOPHILS # BLD: 1 % (ref 0–2)
BASOPHILS ABSOLUTE: 0.1 K/UL (ref 0–0.2)
BUN BLDV-MCNC: 21 MG/DL (ref 8–23)
BUN/CREAT BLD: ABNORMAL (ref 9–20)
CALCIUM SERPL-MCNC: 8.8 MG/DL (ref 8.6–10.4)
CHLORIDE BLD-SCNC: 100 MMOL/L (ref 98–107)
CO2: 27 MMOL/L (ref 20–31)
CREAT SERPL-MCNC: 1.38 MG/DL (ref 0.5–0.9)
DIFFERENTIAL TYPE: ABNORMAL
EOSINOPHILS RELATIVE PERCENT: 7 % (ref 0–4)
GFR AFRICAN AMERICAN: 43 ML/MIN
GFR NON-AFRICAN AMERICAN: 36 ML/MIN
GFR SERPL CREATININE-BSD FRML MDRD: ABNORMAL ML/MIN/{1.73_M2}
GFR SERPL CREATININE-BSD FRML MDRD: ABNORMAL ML/MIN/{1.73_M2}
GLUCOSE BLD-MCNC: 131 MG/DL (ref 70–99)
HCT VFR BLD CALC: 36.6 % (ref 36–46)
HEMOGLOBIN: 12.4 G/DL (ref 12–16)
IMMATURE GRANULOCYTES: ABNORMAL %
LYMPHOCYTES # BLD: 28 % (ref 24–44)
MCH RBC QN AUTO: 32.4 PG (ref 26–34)
MCHC RBC AUTO-ENTMCNC: 34 G/DL (ref 31–37)
MCV RBC AUTO: 95.3 FL (ref 80–100)
MONOCYTES # BLD: 7 % (ref 1–7)
NRBC AUTOMATED: ABNORMAL PER 100 WBC
PDW BLD-RTO: 14.8 % (ref 11.5–14.9)
PLATELET # BLD: 220 K/UL (ref 150–450)
PLATELET ESTIMATE: ABNORMAL
PMV BLD AUTO: 8 FL (ref 6–12)
POTASSIUM SERPL-SCNC: 3.9 MMOL/L (ref 3.7–5.3)
RBC # BLD: 3.84 M/UL (ref 4–5.2)
RBC # BLD: ABNORMAL 10*6/UL
SEG NEUTROPHILS: 57 % (ref 36–66)
SEGMENTED NEUTROPHILS ABSOLUTE COUNT: 2.6 K/UL (ref 1.3–9.1)
SODIUM BLD-SCNC: 137 MMOL/L (ref 135–144)
WBC # BLD: 4.6 K/UL (ref 3.5–11)
WBC # BLD: ABNORMAL 10*3/UL

## 2019-02-15 PROCEDURE — 2500000003 HC RX 250 WO HCPCS: Performed by: STUDENT IN AN ORGANIZED HEALTH CARE EDUCATION/TRAINING PROGRAM

## 2019-02-15 PROCEDURE — 97161 PT EVAL LOW COMPLEX 20 MIN: CPT

## 2019-02-15 PROCEDURE — 99239 HOSP IP/OBS DSCHRG MGMT >30: CPT | Performed by: INTERNAL MEDICINE

## 2019-02-15 PROCEDURE — 93970 EXTREMITY STUDY: CPT

## 2019-02-15 PROCEDURE — G8978 MOBILITY CURRENT STATUS: HCPCS

## 2019-02-15 PROCEDURE — 97530 THERAPEUTIC ACTIVITIES: CPT

## 2019-02-15 PROCEDURE — 96376 TX/PRO/DX INJ SAME DRUG ADON: CPT

## 2019-02-15 PROCEDURE — 80048 BASIC METABOLIC PNL TOTAL CA: CPT

## 2019-02-15 PROCEDURE — 85025 COMPLETE CBC W/AUTO DIFF WBC: CPT

## 2019-02-15 PROCEDURE — 6370000000 HC RX 637 (ALT 250 FOR IP): Performed by: STUDENT IN AN ORGANIZED HEALTH CARE EDUCATION/TRAINING PROGRAM

## 2019-02-15 PROCEDURE — 36415 COLL VENOUS BLD VENIPUNCTURE: CPT

## 2019-02-15 PROCEDURE — 2580000003 HC RX 258: Performed by: STUDENT IN AN ORGANIZED HEALTH CARE EDUCATION/TRAINING PROGRAM

## 2019-02-15 PROCEDURE — G8979 MOBILITY GOAL STATUS: HCPCS

## 2019-02-15 RX ORDER — POTASSIUM CHLORIDE 20 MEQ/1
20 TABLET, EXTENDED RELEASE ORAL DAILY
Qty: 90 TABLET | Refills: 1 | Status: SHIPPED | OUTPATIENT
Start: 2019-02-15 | End: 2019-05-13 | Stop reason: SDUPTHER

## 2019-02-15 RX ORDER — DOXYCYCLINE HYCLATE 100 MG
100 TABLET ORAL 2 TIMES DAILY
Qty: 14 TABLET | Refills: 0 | Status: SHIPPED | OUTPATIENT
Start: 2019-02-15 | End: 2019-02-22

## 2019-02-15 RX ORDER — FAMOTIDINE 20 MG/1
20 TABLET, FILM COATED ORAL DAILY
Status: DISCONTINUED | OUTPATIENT
Start: 2019-02-16 | End: 2019-02-15 | Stop reason: HOSPADM

## 2019-02-15 RX ADMIN — Medication 1 TABLET: at 09:31

## 2019-02-15 RX ADMIN — ASPIRIN 81 MG: 81 TABLET ORAL at 09:29

## 2019-02-15 RX ADMIN — ACETAMINOPHEN 650 MG: 325 TABLET, FILM COATED ORAL at 04:12

## 2019-02-15 RX ADMIN — PRIMIDONE 50 MG: 50 TABLET ORAL at 09:26

## 2019-02-15 RX ADMIN — FAMOTIDINE 20 MG: 20 TABLET, FILM COATED ORAL at 09:24

## 2019-02-15 RX ADMIN — LEVOTHYROXINE SODIUM 100 MCG: 0.1 TABLET ORAL at 05:37

## 2019-02-15 RX ADMIN — Medication 12.5 MG: at 09:27

## 2019-02-15 RX ADMIN — RANOLAZINE 500 MG: 500 TABLET, EXTENDED RELEASE ORAL at 09:26

## 2019-02-15 RX ADMIN — ACETAMINOPHEN 650 MG: 325 TABLET, FILM COATED ORAL at 09:47

## 2019-02-15 RX ADMIN — SERTRALINE HYDROCHLORIDE 100 MG: 100 TABLET, FILM COATED ORAL at 09:36

## 2019-02-15 RX ADMIN — DOCUSATE SODIUM 100 MG: 100 CAPSULE, LIQUID FILLED ORAL at 09:28

## 2019-02-15 RX ADMIN — BUMETANIDE 2 MG: 0.25 INJECTION INTRAMUSCULAR; INTRAVENOUS at 09:23

## 2019-02-15 RX ADMIN — Medication 10 ML: at 09:25

## 2019-02-15 ASSESSMENT — PAIN SCALES - GENERAL
PAINLEVEL_OUTOF10: 1
PAINLEVEL_OUTOF10: 5
PAINLEVEL_OUTOF10: 3

## 2019-02-16 ENCOUNTER — CARE COORDINATION (OUTPATIENT)
Dept: CASE MANAGEMENT | Age: 84
End: 2019-02-16

## 2019-02-16 DIAGNOSIS — I50.43 CHF (CONGESTIVE HEART FAILURE), NYHA CLASS I, ACUTE ON CHRONIC, COMBINED (HCC): Primary | ICD-10-CM

## 2019-02-16 PROCEDURE — 1111F DSCHRG MED/CURRENT MED MERGE: CPT

## 2019-02-20 RX ORDER — NITROGLYCERIN 0.4 MG/1
TABLET SUBLINGUAL
Qty: 25 TABLET | Refills: 3 | Status: SHIPPED | OUTPATIENT
Start: 2019-02-20 | End: 2020-01-16 | Stop reason: SDUPTHER

## 2019-02-21 ENCOUNTER — HOSPITAL ENCOUNTER (OUTPATIENT)
Age: 84
Setting detail: SPECIMEN
Discharge: HOME | End: 2019-02-21
Payer: COMMERCIAL

## 2019-02-21 DIAGNOSIS — N18.4 CKD (CHRONIC KIDNEY DISEASE) STAGE 4, GFR 15-29 ML/MIN (HCC): Chronic | ICD-10-CM

## 2019-02-21 LAB
ANION GAP SERPL CALCULATED.3IONS-SCNC: 21 MMOL/L (ref 9–17)
BUN BLDV-MCNC: 32 MG/DL (ref 8–23)
BUN/CREAT BLD: ABNORMAL (ref 9–20)
CALCIUM SERPL-MCNC: 9.4 MG/DL (ref 8.6–10.4)
CHLORIDE BLD-SCNC: 94 MMOL/L (ref 98–107)
CO2: 24 MMOL/L (ref 20–31)
CREAT SERPL-MCNC: 1.58 MG/DL (ref 0.5–0.9)
GFR AFRICAN AMERICAN: 37 ML/MIN
GFR NON-AFRICAN AMERICAN: 31 ML/MIN
GFR SERPL CREATININE-BSD FRML MDRD: ABNORMAL ML/MIN/{1.73_M2}
GFR SERPL CREATININE-BSD FRML MDRD: ABNORMAL ML/MIN/{1.73_M2}
GLUCOSE BLD-MCNC: 93 MG/DL (ref 70–99)
POTASSIUM SERPL-SCNC: 4.6 MMOL/L (ref 3.7–5.3)
SODIUM BLD-SCNC: 139 MMOL/L (ref 135–144)

## 2019-02-22 ENCOUNTER — CARE COORDINATION (OUTPATIENT)
Dept: CASE MANAGEMENT | Age: 84
End: 2019-02-22

## 2019-02-25 ENCOUNTER — CARE COORDINATION (OUTPATIENT)
Dept: CASE MANAGEMENT | Age: 84
End: 2019-02-25

## 2019-02-26 ENCOUNTER — TELEPHONE (OUTPATIENT)
Dept: INTERNAL MEDICINE CLINIC | Age: 84
End: 2019-02-26

## 2019-02-26 DIAGNOSIS — K59.00 CONSTIPATION, UNSPECIFIED CONSTIPATION TYPE: Primary | ICD-10-CM

## 2019-02-28 RX ORDER — SODIUM PHOSPHATE, DIBASIC AND SODIUM PHOSPHATE, MONOBASIC 7; 19 G/133ML; G/133ML
1 ENEMA RECTAL
Qty: 1 BOTTLE | Refills: 1 | COMMUNITY
Start: 2019-02-28 | End: 2019-02-28

## 2019-03-01 ENCOUNTER — HOSPITAL ENCOUNTER (OUTPATIENT)
Age: 84
Setting detail: SPECIMEN
Discharge: HOME | End: 2019-03-01
Payer: COMMERCIAL

## 2019-03-01 ENCOUNTER — CARE COORDINATION (OUTPATIENT)
Dept: CASE MANAGEMENT | Age: 84
End: 2019-03-01

## 2019-03-01 DIAGNOSIS — N18.4 CKD (CHRONIC KIDNEY DISEASE), STAGE IV (HCC): ICD-10-CM

## 2019-03-01 DIAGNOSIS — F31.9 BIPOLAR 1 DISORDER, DEPRESSED (HCC): Chronic | ICD-10-CM

## 2019-03-01 DIAGNOSIS — E03.9 HYPOTHYROIDISM, UNSPECIFIED TYPE: ICD-10-CM

## 2019-03-01 DIAGNOSIS — I50.32 CHRONIC DIASTOLIC HEART FAILURE (HCC): Chronic | ICD-10-CM

## 2019-03-01 DIAGNOSIS — I25.110 CORONARY ARTERY DISEASE INVOLVING NATIVE CORONARY ARTERY OF NATIVE HEART WITH UNSTABLE ANGINA PECTORIS (HCC): Chronic | ICD-10-CM

## 2019-03-01 DIAGNOSIS — R60.0 BILATERAL LEG EDEMA: ICD-10-CM

## 2019-03-01 LAB
ALBUMIN SERPL-MCNC: 4.1 G/DL (ref 3.5–5.2)
ALBUMIN/GLOBULIN RATIO: 1.7 (ref 1–2.5)
ALP BLD-CCNC: 85 U/L (ref 35–104)
ALT SERPL-CCNC: 20 U/L (ref 5–33)
ANION GAP SERPL CALCULATED.3IONS-SCNC: 12 MMOL/L (ref 9–17)
AST SERPL-CCNC: 28 U/L
BILIRUB SERPL-MCNC: 0.23 MG/DL (ref 0.3–1.2)
BNP INTERPRETATION: NORMAL
BUN BLDV-MCNC: 33 MG/DL (ref 8–23)
BUN/CREAT BLD: ABNORMAL (ref 9–20)
CALCIUM IONIZED: 1.18 MMOL/L (ref 1.13–1.33)
CALCIUM SERPL-MCNC: 9.3 MG/DL (ref 8.6–10.4)
CHLORIDE BLD-SCNC: 86 MMOL/L (ref 98–107)
CO2: 33 MMOL/L (ref 20–31)
CREAT SERPL-MCNC: 1.54 MG/DL (ref 0.5–0.9)
GFR AFRICAN AMERICAN: 38 ML/MIN
GFR NON-AFRICAN AMERICAN: 32 ML/MIN
GFR SERPL CREATININE-BSD FRML MDRD: ABNORMAL ML/MIN/{1.73_M2}
GFR SERPL CREATININE-BSD FRML MDRD: ABNORMAL ML/MIN/{1.73_M2}
GLUCOSE BLD-MCNC: 101 MG/DL (ref 70–99)
HCT VFR BLD CALC: 38.6 % (ref 36.3–47.1)
HEMOGLOBIN: 12.6 G/DL (ref 11.9–15.1)
MAGNESIUM: 2.6 MG/DL (ref 1.6–2.6)
MCH RBC QN AUTO: 31.4 PG (ref 25.2–33.5)
MCHC RBC AUTO-ENTMCNC: 32.6 G/DL (ref 28.4–34.8)
MCV RBC AUTO: 96.3 FL (ref 82.6–102.9)
NRBC AUTOMATED: 0 PER 100 WBC
PDW BLD-RTO: 13.3 % (ref 11.8–14.4)
PHOSPHORUS: 3.7 MG/DL (ref 2.6–4.5)
PLATELET # BLD: 244 K/UL (ref 138–453)
PMV BLD AUTO: 10.9 FL (ref 8.1–13.5)
POTASSIUM SERPL-SCNC: 3.6 MMOL/L (ref 3.7–5.3)
PRO-BNP: 291 PG/ML
PTH INTACT: 67.42 PG/ML (ref 15–65)
RBC # BLD: 4.01 M/UL (ref 3.95–5.11)
SODIUM BLD-SCNC: 131 MMOL/L (ref 135–144)
TOTAL PROTEIN: 6.5 G/DL (ref 6.4–8.3)
TSH SERPL DL<=0.05 MIU/L-ACNC: 1.3 MIU/L (ref 0.3–5)
WBC # BLD: 5.7 K/UL (ref 3.5–11.3)

## 2019-03-04 RX ORDER — BUMETANIDE 2 MG/1
TABLET ORAL
Qty: 30 TABLET | Refills: 6 | Status: SHIPPED | OUTPATIENT
Start: 2019-03-04 | End: 2019-03-05 | Stop reason: SDUPTHER

## 2019-03-05 ENCOUNTER — OFFICE VISIT (OUTPATIENT)
Dept: INTERNAL MEDICINE CLINIC | Age: 84
End: 2019-03-05
Payer: COMMERCIAL

## 2019-03-05 VITALS
SYSTOLIC BLOOD PRESSURE: 110 MMHG | DIASTOLIC BLOOD PRESSURE: 40 MMHG | HEIGHT: 59 IN | BODY MASS INDEX: 35.28 KG/M2 | WEIGHT: 175 LBS

## 2019-03-05 DIAGNOSIS — R53.83 FATIGUE, UNSPECIFIED TYPE: ICD-10-CM

## 2019-03-05 DIAGNOSIS — F32.A DEPRESSION, UNSPECIFIED DEPRESSION TYPE: ICD-10-CM

## 2019-03-05 DIAGNOSIS — E03.9 HYPOTHYROIDISM, UNSPECIFIED TYPE: ICD-10-CM

## 2019-03-05 DIAGNOSIS — I50.9 CONGESTIVE HEART FAILURE, UNSPECIFIED HF CHRONICITY, UNSPECIFIED HEART FAILURE TYPE (HCC): Primary | ICD-10-CM

## 2019-03-05 DIAGNOSIS — N18.9 CHRONIC KIDNEY DISEASE, UNSPECIFIED CKD STAGE: ICD-10-CM

## 2019-03-05 PROCEDURE — 99214 OFFICE O/P EST MOD 30 MIN: CPT | Performed by: NURSE PRACTITIONER

## 2019-03-05 RX ORDER — BUMETANIDE 2 MG/1
TABLET ORAL
Qty: 30 TABLET | Refills: 3 | Status: SHIPPED | OUTPATIENT
Start: 2019-03-05 | End: 2019-03-26

## 2019-03-05 ASSESSMENT — ENCOUNTER SYMPTOMS
ABDOMINAL PAIN: 0
CONSTIPATION: 0
WHEEZING: 0
SINUS PRESSURE: 0
TROUBLE SWALLOWING: 0
EYE REDNESS: 0
SHORTNESS OF BREATH: 1
COUGH: 0
BACK PAIN: 1
EYE PAIN: 0

## 2019-03-19 ENCOUNTER — HOSPITAL ENCOUNTER (OUTPATIENT)
Age: 84
Setting detail: SPECIMEN
Discharge: HOME | End: 2019-03-19
Payer: COMMERCIAL

## 2019-03-19 ENCOUNTER — OFFICE VISIT (OUTPATIENT)
Dept: INTERNAL MEDICINE CLINIC | Age: 84
End: 2019-03-19
Payer: COMMERCIAL

## 2019-03-19 VITALS
BODY MASS INDEX: 36.41 KG/M2 | TEMPERATURE: 97.7 F | HEIGHT: 59 IN | HEART RATE: 76 BPM | WEIGHT: 180.6 LBS | OXYGEN SATURATION: 96 % | SYSTOLIC BLOOD PRESSURE: 112 MMHG | DIASTOLIC BLOOD PRESSURE: 60 MMHG

## 2019-03-19 DIAGNOSIS — R45.86 MOOD CHANGE: ICD-10-CM

## 2019-03-19 DIAGNOSIS — R30.0 DYSURIA: Primary | ICD-10-CM

## 2019-03-19 DIAGNOSIS — R53.1 GENERAL WEAKNESS: ICD-10-CM

## 2019-03-19 DIAGNOSIS — R30.0 DYSURIA: ICD-10-CM

## 2019-03-19 LAB
BILIRUBIN URINE: NEGATIVE
COLOR: YELLOW
COMMENT UA: NORMAL
GLUCOSE URINE: NEGATIVE
KETONES, URINE: NEGATIVE
LEUKOCYTE ESTERASE, URINE: NEGATIVE
NITRITE, URINE: NEGATIVE
PH UA: 7.5 (ref 5–8)
PROTEIN UA: NEGATIVE
SPECIFIC GRAVITY UA: 1.01 (ref 1–1.03)
TURBIDITY: CLEAR
URINE HGB: NEGATIVE
UROBILINOGEN, URINE: NORMAL

## 2019-03-19 PROCEDURE — 99213 OFFICE O/P EST LOW 20 MIN: CPT | Performed by: NURSE PRACTITIONER

## 2019-03-19 ASSESSMENT — ENCOUNTER SYMPTOMS
CONSTIPATION: 1
COLOR CHANGE: 0
NAUSEA: 0
COUGH: 1
SHORTNESS OF BREATH: 1
ABDOMINAL PAIN: 0

## 2019-03-26 ENCOUNTER — HOSPITAL ENCOUNTER (OUTPATIENT)
Dept: OTHER | Age: 84
Discharge: HOME | End: 2019-03-26
Payer: COMMERCIAL

## 2019-03-26 VITALS
DIASTOLIC BLOOD PRESSURE: 60 MMHG | SYSTOLIC BLOOD PRESSURE: 128 MMHG | RESPIRATION RATE: 20 BRPM | BODY MASS INDEX: 36.36 KG/M2 | WEIGHT: 180.1 LBS | OXYGEN SATURATION: 98 % | HEART RATE: 73 BPM

## 2019-03-26 LAB
ANION GAP SERPL CALCULATED.3IONS-SCNC: 12 MMOL/L (ref 9–17)
BNP INTERPRETATION: ABNORMAL
BUN BLDV-MCNC: 19 MG/DL (ref 8–23)
CHLORIDE BLD-SCNC: 100 MMOL/L (ref 98–107)
CO2: 27 MMOL/L (ref 20–31)
CREAT SERPL-MCNC: 1.51 MG/DL (ref 0.5–0.9)
GFR AFRICAN AMERICAN: 39 ML/MIN
GFR NON-AFRICAN AMERICAN: 32 ML/MIN
GFR SERPL CREATININE-BSD FRML MDRD: ABNORMAL ML/MIN/{1.73_M2}
GFR SERPL CREATININE-BSD FRML MDRD: ABNORMAL ML/MIN/{1.73_M2}
POTASSIUM SERPL-SCNC: 4 MMOL/L (ref 3.7–5.3)
PRO-BNP: 552 PG/ML
SODIUM BLD-SCNC: 139 MMOL/L (ref 135–144)

## 2019-03-26 PROCEDURE — 83880 ASSAY OF NATRIURETIC PEPTIDE: CPT

## 2019-03-26 PROCEDURE — 99212 OFFICE O/P EST SF 10 MIN: CPT

## 2019-03-26 PROCEDURE — 82565 ASSAY OF CREATININE: CPT

## 2019-03-26 PROCEDURE — 80051 ELECTROLYTE PANEL: CPT

## 2019-03-26 PROCEDURE — 84520 ASSAY OF UREA NITROGEN: CPT

## 2019-03-26 PROCEDURE — 36415 COLL VENOUS BLD VENIPUNCTURE: CPT

## 2019-04-03 ENCOUNTER — HOSPITAL ENCOUNTER (OUTPATIENT)
Age: 84
Setting detail: SPECIMEN
Discharge: HOME | End: 2019-04-03
Payer: COMMERCIAL

## 2019-04-04 ENCOUNTER — HOSPITAL ENCOUNTER (OUTPATIENT)
Age: 84
Setting detail: SPECIMEN
Discharge: HOME | End: 2019-04-04
Payer: COMMERCIAL

## 2019-04-04 LAB
ABSOLUTE EOS #: 0.35 K/UL (ref 0–0.44)
ABSOLUTE IMMATURE GRANULOCYTE: <0.03 K/UL (ref 0–0.3)
ABSOLUTE LYMPH #: 1.11 K/UL (ref 1.1–3.7)
ABSOLUTE MONO #: 0.62 K/UL (ref 0.1–1.2)
ANION GAP SERPL CALCULATED.3IONS-SCNC: 17 MMOL/L (ref 9–17)
BASOPHILS # BLD: 1 % (ref 0–2)
BASOPHILS ABSOLUTE: 0.07 K/UL (ref 0–0.2)
BUN BLDV-MCNC: 29 MG/DL (ref 8–23)
BUN/CREAT BLD: ABNORMAL (ref 9–20)
CALCIUM IONIZED: 1.15 MMOL/L (ref 1.13–1.33)
CALCIUM SERPL-MCNC: 9.4 MG/DL (ref 8.6–10.4)
CHLORIDE BLD-SCNC: 92 MMOL/L (ref 98–107)
CO2: 29 MMOL/L (ref 20–31)
CREAT SERPL-MCNC: 1.42 MG/DL (ref 0.5–0.9)
CREATININE URINE: 25.3 MG/DL (ref 28–217)
DIFFERENTIAL TYPE: ABNORMAL
EOSINOPHILS RELATIVE PERCENT: 7 % (ref 1–4)
GFR AFRICAN AMERICAN: 42 ML/MIN
GFR NON-AFRICAN AMERICAN: 35 ML/MIN
GFR SERPL CREATININE-BSD FRML MDRD: ABNORMAL ML/MIN/{1.73_M2}
GFR SERPL CREATININE-BSD FRML MDRD: ABNORMAL ML/MIN/{1.73_M2}
GLUCOSE BLD-MCNC: 81 MG/DL (ref 70–99)
HCT VFR BLD CALC: 39.2 % (ref 36.3–47.1)
HEMOGLOBIN: 12.8 G/DL (ref 11.9–15.1)
IMMATURE GRANULOCYTES: 0 %
LYMPHOCYTES # BLD: 23 % (ref 24–43)
MCH RBC QN AUTO: 31.6 PG (ref 25.2–33.5)
MCHC RBC AUTO-ENTMCNC: 32.7 G/DL (ref 28.4–34.8)
MCV RBC AUTO: 96.8 FL (ref 82.6–102.9)
MONOCYTES # BLD: 13 % (ref 3–12)
NRBC AUTOMATED: 0 PER 100 WBC
PDW BLD-RTO: 13.8 % (ref 11.8–14.4)
PHOSPHORUS: 3.8 MG/DL (ref 2.6–4.5)
PLATELET # BLD: 258 K/UL (ref 138–453)
PLATELET ESTIMATE: ABNORMAL
PMV BLD AUTO: 10.8 FL (ref 8.1–13.5)
POTASSIUM SERPL-SCNC: 3.8 MMOL/L (ref 3.7–5.3)
PTH INTACT: 98.61 PG/ML (ref 15–65)
RBC # BLD: 4.05 M/UL (ref 3.95–5.11)
RBC # BLD: ABNORMAL 10*6/UL
SEG NEUTROPHILS: 56 % (ref 36–65)
SEGMENTED NEUTROPHILS ABSOLUTE COUNT: 2.71 K/UL (ref 1.5–8.1)
SODIUM BLD-SCNC: 138 MMOL/L (ref 135–144)
TOTAL PROTEIN, URINE: <4 MG/DL
VITAMIN D 25-HYDROXY: 45.9 NG/ML (ref 30–100)
WBC # BLD: 4.9 K/UL (ref 3.5–11.3)
WBC # BLD: ABNORMAL 10*3/UL

## 2019-04-24 ENCOUNTER — HOSPITAL ENCOUNTER (OUTPATIENT)
Age: 84
Setting detail: SPECIMEN
Discharge: HOME | End: 2019-04-24
Payer: COMMERCIAL

## 2019-04-24 DIAGNOSIS — G25.0 ESSENTIAL TREMOR: ICD-10-CM

## 2019-04-24 DIAGNOSIS — N18.4 CKD (CHRONIC KIDNEY DISEASE) STAGE 4, GFR 15-29 ML/MIN (HCC): ICD-10-CM

## 2019-04-24 RX ORDER — PRIMIDONE 50 MG/1
50 TABLET ORAL 3 TIMES DAILY
Qty: 90 TABLET | Refills: 3 | Status: SHIPPED | OUTPATIENT
Start: 2019-04-24 | End: 2019-11-11 | Stop reason: SDUPTHER

## 2019-04-25 LAB
ANION GAP SERPL CALCULATED.3IONS-SCNC: 13 MMOL/L (ref 9–17)
BUN BLDV-MCNC: 36 MG/DL (ref 8–23)
BUN/CREAT BLD: ABNORMAL (ref 9–20)
CALCIUM SERPL-MCNC: 9.7 MG/DL (ref 8.6–10.4)
CHLORIDE BLD-SCNC: 88 MMOL/L (ref 98–107)
CO2: 34 MMOL/L (ref 20–31)
CREAT SERPL-MCNC: 1.5 MG/DL (ref 0.5–0.9)
GFR AFRICAN AMERICAN: 39 ML/MIN
GFR NON-AFRICAN AMERICAN: 33 ML/MIN
GFR SERPL CREATININE-BSD FRML MDRD: ABNORMAL ML/MIN/{1.73_M2}
GFR SERPL CREATININE-BSD FRML MDRD: ABNORMAL ML/MIN/{1.73_M2}
GLUCOSE BLD-MCNC: 99 MG/DL (ref 70–99)
POTASSIUM SERPL-SCNC: 3.7 MMOL/L (ref 3.7–5.3)
SODIUM BLD-SCNC: 135 MMOL/L (ref 135–144)

## 2019-04-29 NOTE — TELEPHONE ENCOUNTER
Medication: Sertraline  Last visit: 3/19/19  Next visit: Visit date not found  Last refill: 9/17/18  Pharmacy: [facility]

## 2019-04-30 ENCOUNTER — OFFICE VISIT (OUTPATIENT)
Dept: INTERNAL MEDICINE CLINIC | Age: 84
End: 2019-04-30
Payer: COMMERCIAL

## 2019-04-30 VITALS
HEIGHT: 59 IN | WEIGHT: 178 LBS | SYSTOLIC BLOOD PRESSURE: 106 MMHG | DIASTOLIC BLOOD PRESSURE: 60 MMHG | BODY MASS INDEX: 35.88 KG/M2

## 2019-04-30 DIAGNOSIS — I50.9 CONGESTIVE HEART FAILURE, UNSPECIFIED HF CHRONICITY, UNSPECIFIED HEART FAILURE TYPE (HCC): ICD-10-CM

## 2019-04-30 DIAGNOSIS — R53.83 FATIGUE, UNSPECIFIED TYPE: ICD-10-CM

## 2019-04-30 DIAGNOSIS — N18.4 CKD (CHRONIC KIDNEY DISEASE) STAGE 4, GFR 15-29 ML/MIN (HCC): Chronic | ICD-10-CM

## 2019-04-30 DIAGNOSIS — F32.A DEPRESSION, UNSPECIFIED DEPRESSION TYPE: Primary | ICD-10-CM

## 2019-04-30 PROCEDURE — 99214 OFFICE O/P EST MOD 30 MIN: CPT | Performed by: PHYSICIAN ASSISTANT

## 2019-04-30 RX ORDER — SERTRALINE HYDROCHLORIDE 100 MG/1
100 TABLET, FILM COATED ORAL DAILY
Qty: 90 TABLET | Refills: 3 | Status: SHIPPED | OUTPATIENT
Start: 2019-04-30 | End: 2019-05-24 | Stop reason: SDUPTHER

## 2019-04-30 ASSESSMENT — ENCOUNTER SYMPTOMS
EYE REDNESS: 0
ABDOMINAL PAIN: 0
SINUS PRESSURE: 0
CONSTIPATION: 0
BACK PAIN: 0
NAUSEA: 0
SHORTNESS OF BREATH: 0
WHEEZING: 0
EYE PAIN: 0
COUGH: 0
VOMITING: 0
TROUBLE SWALLOWING: 0
DIARRHEA: 0

## 2019-04-30 NOTE — PROGRESS NOTES
[facility] PHYSICIANS  [facility]  [address]  [address] [address]  Dept: [phone]  Dept Fax: [phone]    OfficeProgress/Follow Up Note  Date of patient's visit: 4/30/2019  Patient's Name:  Latisha Carroll YOB: 1927            Patient Care Team:  Roslyn Garrido MD as PCP - General (Internal Medicine)  Dennise Lundborg, MD as PCP - S Attributed Provider  Ghassan Serrano DO as Consulting Physician (Obstetrics & Gynecology)  Kody Knutson MD as Consulting Physician (Nephrology)  Simone Lund MD as Consulting Physician (Gastroenterology)    REASON FOR VISIT:  Follow up    HISTORY OF PRESENT ILLNESS:      Chief Complaint   Patient presents with    Results     review labs, had appt with Dr. Valdo Ayala but it was canceled. History was obtained from the patient. Latisha Carroll is a 80 y.o. female here today for above. Fatigue. Depression. Patient recently had labs. TSH normal. Hgb normal. Cr to baseline. Patient reports increasing depression. On Zoloft. Patient isolating to room. No thoughts to harm self. MEDICATIONS:      Current Outpatient Medications   Medication Sig Dispense Refill    primidone (MYSOLINE) 50 MG tablet Take 1 tablet by mouth 3 times daily 90 tablet 3    metolazone (ZAROXOLYN) 2.5 MG tablet Take one tablet every other day 15 tablet 5    bumetanide (BUMEX) 1 MG tablet Take 3 tablets in am and 2 tablets in pm (Patient taking differently: Take 1 mg by mouth Take 3 tablets in am and 3 tablets in pm) 150 tablet 5    nitroGLYCERIN (NITROSTAT) 0.4 MG SL tablet up to max of 3 total doses.  If no relief after 1 dose, call 911. 25 tablet 3    potassium chloride (KLOR-CON M) 20 MEQ extended release tablet Take 1 tablet by mouth daily 90 tablet 1    fluticasone (FLONASE) 50 MCG/ACT nasal spray 1 spray by Each Nare route daily 1 Spray in each nostril (Patient taking differently: 1 spray by Each Nare route daily as needed 1 [place] in each nostril) 2 Bottle 1    metoprolol tartrate (LOPRESSOR) 25 MG tablet TAKE 0.5 TABLETS BY MOUTH DAILY 30 tablet 11    levothyroxine (SYNTHROID) 100 MCG tablet Take 1 tablet by mouth daily 30 tablet 11    sertraline (ZOLOFT) 100 MG tablet Take 1 tablet by mouth daily 30 tablet 8    ranolazine (RANEXA) 500 MG extended release tablet Take 500 mg by mouth 2 times daily      docusate sodium (COLACE, DULCOLAX) 100 MG CAPS Take 100 mg by mouth daily 30 capsule 3    aspirin 81 MG tablet Take 81 mg by mouth daily.  Multiple Vitamins-Minerals (THERAPEUTIC MULTIVITAMIN-MINERALS) tablet Take 1 tablet by mouth daily. No current facility-administered medications for this visit. ALLERGIES:      Allergies   Allergen Reactions    Dye [Iodides] Hives     IVP dye caused reaction with hives      SOCIAL HISTORY   Reviewed and no change from previous record. [place]  reports that she has quit smoking. She has never used smokeless tobacco.    FAMILY HISTORY:    Reviewed and No change from previous visit    REVIEW OF SYSTEMS:    Review of Systems   Constitutional: Positive for fatigue. Negative for chills and fever. HENT: Positive for hearing loss. Negative for sinus pressure and trouble swallowing. Eyes: Negative for pain and redness. Respiratory: Negative for cough, shortness of breath and wheezing. Cardiovascular: Positive for leg swelling. Negative for chest pain and palpitations. Gastrointestinal: Negative for abdominal pain, constipation, diarrhea, nausea and vomiting. Genitourinary: Negative for dysuria, flank pain, frequency, hematuria and pelvic pain. Musculoskeletal: Negative for arthralgias, back pain and gait problem. Neurological: Negative for dizziness and headaches. Psychiatric/Behavioral: Positive for dysphoric mood. Negative for sleep disturbance and suicidal ideas. The patient is not nervous/anxious.        PHYSICAL EXAM:      Vitals:    04/30/19 1325   BP: 106/60   Weight: 178 lb (80.7 kg)   Height: 4' 11.02\" (1.499 m)     General -alert, well appearing, and in no distress  Skin - normal coloration and turgor, no rashes  Eyes - pupils equal and reactive, extraocular eye movementsintact  Mouth - mucous membranes are moist, pharynx normal without lesions  Neck - supple, no significant adenopathy  Lymphatics - no palpable lymphadenopathy, no hepatosplenomegaly  Chest - clear to auscultation,  symmetric air entry  Heart - normal rate, regular rhythm, no murmur  Abdomen - soft, nontender, nondistended, no masses or organomegaly  Neurological - alert, oriented, normal speech, no focal sensory or motor deficit  Extremities - peripheral pulses normal, 1+ edema bilaterally    ASSESSMENT AND PLAN:      1. Depression, unspecified depression type  - Discussed with patient and daughter, continue Zoloft  - Patient encouraged to participate in group activities in assisted living facility    2. Fatigue, unspecified type  - Reviewed recent labs, within normal  - Cr to baseline  - TSH and hgb normal    3. Congestive heart failure, unspecified HF chronicity, unspecified heart failure type ([address]  - Stable  - Continue present management    4. CKD (chronic kidney disease) stage 4, GFR 15-29 ml/min (HCC)  - Stable  - Continue present management    FOLLOW UP AND INSTRUCTIONS:   Return in about 1 month (around 5/28/2019) for follow up on fatigue/depression. Discussed use, benefit, and side effects of prescribed medications. All patient questions answered. Patient voiced understanding. Patient given educational materials - see patient instructions    LIA Walker [facility]  4/30/2019, 2:37 PM    Please note that this chart wasgenerated using voice recognition Dragon dictation software. Although every effort was made to ensure the accuracy of this automated transcription, some errors in transcription may have occurred.

## 2019-04-30 NOTE — PROGRESS NOTES
Visit Information    Have you changed or started any medications since your last visit including any over-the-counter medicines, vitamins, or herbal medicines? no   Have you stopped taking any of your medications? Is so, why? -  no  Are you having any side effects from any of your medications? - no    Have you seen any other physician or provider since your last visit?  no   Have you had any other diagnostic tests since your last visit?  no   Have you been seen in the emergency room and/or had an admission in a hospital since we last saw you?  no   Have you had your routine dental cleaning in the past 6 months?  no     Do you have an active MyChart account? If no, what is the barrier?   Yes    Patient Care Team:  Shanna Hawkins MD as PCP - General (Internal Medicine)  Alex Strong MD as PCP - S Attributed Provider  Jeannette Rock DO as Consulting Physician (Obstetrics & Gynecology)  Cruz Tejada MD as Consulting Physician (Nephrology)  Mehul Forrest MD as Consulting Physician (Gastroenterology)    Medical History Review  Past Medical, Family, and Social History reviewed and does not contribute to the patient presenting condition    Health Maintenance   Topic Date Due    Hepatitis B Vaccine (1 of 3 - Risk 3-dose series) 09/29/1946    DTaP/Tdap/Td vaccine (1 - Tdap) 08/30/2019 (Originally 9/29/1946)    Shingles Vaccine (1 of 2) 09/06/2019 (Originally 9/29/1977)    TSH testing  03/01/2020    Potassium monitoring  04/24/2020    Creatinine monitoring  04/24/2020    Flu vaccine  Completed    Pneumococcal 65+ years Vaccine  Completed    HPV vaccine  Aged Out

## 2019-05-14 RX ORDER — POTASSIUM CHLORIDE 20 MEQ/1
20 TABLET, EXTENDED RELEASE ORAL DAILY
Qty: 90 TABLET | Refills: 1 | Status: SHIPPED | OUTPATIENT
Start: 2019-05-14 | End: 2019-06-24 | Stop reason: SDUPTHER

## 2019-05-28 RX ORDER — SERTRALINE HYDROCHLORIDE 100 MG/1
100 TABLET, FILM COATED ORAL DAILY
Qty: 90 TABLET | Refills: 3 | Status: SHIPPED | OUTPATIENT
Start: 2019-05-28 | End: 2019-06-24 | Stop reason: SDUPTHER

## 2019-05-29 ENCOUNTER — OFFICE VISIT (OUTPATIENT)
Dept: INTERNAL MEDICINE CLINIC | Age: 84
End: 2019-05-29
Payer: COMMERCIAL

## 2019-05-29 VITALS
HEIGHT: 59 IN | BODY MASS INDEX: 35.88 KG/M2 | DIASTOLIC BLOOD PRESSURE: 64 MMHG | SYSTOLIC BLOOD PRESSURE: 118 MMHG | WEIGHT: 178 LBS | HEART RATE: 71 BPM | OXYGEN SATURATION: 97 %

## 2019-05-29 DIAGNOSIS — I10 ESSENTIAL HYPERTENSION: ICD-10-CM

## 2019-05-29 DIAGNOSIS — F03.90 DEMENTIA WITHOUT BEHAVIORAL DISTURBANCE, UNSPECIFIED DEMENTIA TYPE: Primary | ICD-10-CM

## 2019-05-29 DIAGNOSIS — E03.9 HYPOTHYROIDISM, UNSPECIFIED TYPE: ICD-10-CM

## 2019-05-29 DIAGNOSIS — G25.0 ESSENTIAL TREMOR: ICD-10-CM

## 2019-05-29 DIAGNOSIS — F32.A DEPRESSION, UNSPECIFIED DEPRESSION TYPE: ICD-10-CM

## 2019-05-29 DIAGNOSIS — I25.110 CORONARY ARTERY DISEASE INVOLVING NATIVE CORONARY ARTERY OF NATIVE HEART WITH UNSTABLE ANGINA PECTORIS (HCC): ICD-10-CM

## 2019-05-29 DIAGNOSIS — N18.4 CKD (CHRONIC KIDNEY DISEASE), STAGE IV (HCC): ICD-10-CM

## 2019-05-29 DIAGNOSIS — I50.43 CHF (CONGESTIVE HEART FAILURE), NYHA CLASS I, ACUTE ON CHRONIC, COMBINED (HCC): ICD-10-CM

## 2019-05-29 DIAGNOSIS — M17.0 ARTHRITIS OF BOTH KNEES: ICD-10-CM

## 2019-05-29 DIAGNOSIS — J41.0 SIMPLE CHRONIC BRONCHITIS (HCC): ICD-10-CM

## 2019-05-29 PROCEDURE — 99214 OFFICE O/P EST MOD 30 MIN: CPT | Performed by: INTERNAL MEDICINE

## 2019-05-29 NOTE — PROGRESS NOTES
Visit Information    Have you changed or started any medications since your last visit including any over-the-counter medicines, vitamins, or herbal medicines? no   Are you having any side effects from any of your medications? -  no  Have you stopped taking any of your medications? Is so, why? -  no    Have you seen any other physician or provider since your last visit? No  Have you had any other diagnostic tests since your last visit? No  Have you been seen in the emergency room and/or had an admission to a hospital since we last saw you? No  Have you had your routine dental cleaning in the past 6 months? no    Have you activated your [employer] account? If not, what are your barriers? Yes     Patient Care Team:  Adriana Dueñas MD as PCP - General (Internal Medicine)  Nadia Brown MD as PCP - S Attributed Provider  Dixon Ho DO as Consulting Physician (Obstetrics & Gynecology)  Brett Mandel MD as Consulting Physician (Nephrology)  Lavinia Justin MD as Consulting Physician (Gastroenterology)    Medical History Review  Past Medical, Family, and Social History reviewed and does not contribute to the patient presenting condition    Health Maintenance   Topic Date Due    Hepatitis B Vaccine (1 of 3 - Risk 3-dose series) [birth year]1946    DTaP/Tdap/Td vaccine (1 - Tdap) 08/30/2019 (Originally 9/29/1946)    Shingles Vaccine (1 of 2) 09/06/2019 (Originally 9/29/1977)    TSH testing  03/01/2020    Potassium monitoring  04/24/2020    Creatinine monitoring  04/24/2020    Flu vaccine  Completed    Pneumococcal 65+ years Vaccine  Completed    HPV vaccine  Aged Out     Chief Complaint   Patient presents with    Fatigue     falls asleep often at home, has no energy to walk, was on b 12 years ago would like to have it checked     Weight Gain     co that she is gaining weight      . OFFICE VISIT  PROGRESS NOTE         Date of patient's visit: 5/29/2019  Patient's Name:  Peggy Rincon  YOB: 1927       Patient Care Team:  Franky Arriaga MD as PCP - General (Internal Medicine)  Rosalba Champion MD as PCP - S Attributed Provider  Freddy Murdock DO as Consulting Physician (Obstetrics & Gynecology)  Agustín Bryant MD as Consulting Physician (Nephrology)  Lisa Waters MD as Consulting Physician (Gastroenterology)        SUBJECTIVE     HISTORY           History of present illness     Pertinent details  added to ,       Chief Complaint   Patient presents with    Fatigue     falls asleep often at home, has no energy to walk, was on b 12 years ago would like to have it checked     Weight Gain     co that she is gaining weight           Encounter Diagnoses   Name Primary?  Dementia without behavioral disturbance, unspecified dementia type Yes  Unchanged    Depression, unspecified depression type  improved    CKD (chronic kidney disease), stage IV (HCC)  asymptomatic    CHF (congestive heart failure), NYHA class I, acute on chronic, combined (HCC)  asymptomatic    Hypothyroidism, unspecified type  worsening fatigue and weight gain noted    Coronary artery disease involving native coronary artery of native heart with unstable angina pectoris (HCC)  no chest pain    Essential hypertension  no shortness of breath    Essential tremor  unchanged    Simple chronic bronchitis (HCC)            Symptom / problem          ---                      ROS AS NOTED IN HPI ,    Other  positive-- Review of Systems    ALL OTHER  SYSTEM REVIEW NEGATIVE. Allergies; medicatons; past medical, surgical, family, and social history; and problem list reviewed by me, as indicated in this encounter  .         OBJECTIVE      Physical exam      Vitals:    05/29/19 1129   BP: 118/64   Pulse: 71   SpO2: 97%   Weight: 178 lb (80.7 kg)   Height: 4' 11.02\" (1.499 m)        Estimated body mass index is 35.93 kg/m² as calculated from the following:    Height as of this encounter: 4' 11.02\" (1.499 m). Weight as of this encounter: 178 lb (80.7 kg). Physical Exam   Vitals:  /64   Pulse 71   Ht 4' 11.02\" (1.499 m)   Wt 178 lb (80.7 kg)   LMP  (LMP Unknown)   SpO2 97%   BMI 35.93 kg/m²                 Body mass index is 35.93 kg/m². General Appearance:   Alert , CO-OPERATIVE ,     H E:E N T      No icterus, no pallor . No ptosis. No gross asymmetry  Or abnormality face            Skin:                             No rash or erythema  Neck:                            No mass , no thyroid enlargement                                           Pulmonary/Chest:        Clear to auscultation bilaterally . No wheezes, rales or rhonchi . Cardiovascular:            Normal rate, regular rhythm,                                          No murmur or  Gallop . Abdomen:                       Soft, non-tender                                           Normal bowels sounds,                                             Extremities:                    No  Edema .                                                         Anastasiya Harrah / JOSE / Andreia Bonds    [x] Reviewed       Labs      URINE ANALYSIS: No results found for: LABURIN     CBC:  Lab Results   Component Value Date    WBC 4.9 04/03/2019    HGB 12.8 04/03/2019     04/03/2019     06/03/2012        BMP:    Lab Results   Component Value Date     04/24/2019    K 3.7 04/24/2019    CL 88 04/24/2019    CO2 34 04/24/2019    BUN 36 04/24/2019    CREATININE 1.50 04/24/2019    GLUCOSE 99 04/24/2019    GLUCOSE 94 06/03/2012        No components found for: LDLC  Lab Results   Component Value Date    LABA1C 5.0 There are no Patient Instructions on file for this visit. Medication List           Accurate as of 5/29/19 11:48 AM. If you have any questions, ask your nurse or doctor. CHANGE how you take these medications    bumetanide 1 MG tablet  Commonly known as:  BUMEX  Take 3 tablets in am and 2 tablets in pm  What changed:    · how much to take  · how to take this  · additional instructions     fluticasone 50 MCG/ACT nasal spray  Commonly known as:  FLONASE  1 spray by Each Nare route daily 1 Spray in each nostril  What changed:    · when to take this  · reasons to take this  · additional instructions        CONTINUE taking these medications    aspirin 81 MG tablet     docusate 100 MG Caps  Commonly known as:  COLACE, DULCOLAX  Take 100 mg by mouth daily     levothyroxine 100 MCG tablet  Commonly known as:  SYNTHROID  Take 1 tablet by mouth daily     metolazone 2.5 MG tablet  Commonly known as:  ZAROXOLYN  Take one tablet every other day     metoprolol tartrate 25 MG tablet  Commonly known as:  LOPRESSOR  TAKE 0.5 TABLETS BY MOUTH DAILY     nitroGLYCERIN 0.4 MG SL tablet  Commonly known as:  NITROSTAT  up to max of 3 total doses. If no relief after 1 dose, call 911. potassium chloride 20 MEQ extended release tablet  Commonly known as:  KLOR-CON M  TAKE 1 TABLET BY MOUTH DAILY     primidone 50 MG tablet  Commonly known as: MYSOLINE  Take 1 tablet by mouth 3 times daily     ranolazine 500 MG extended release tablet  Commonly known as:  RANEXA     sertraline 100 MG tablet  Commonly known as:  ZOLOFT  Take 1 tablet by mouth daily     therapeutic multivitamin-minerals tablet                FOLLOW UP  PLANS     No follow-ups on file. MD STANISLAV Brock [address], [address] [place] [address].    Phone [phone]   Fax: [phone]  Answering Service: [phone]

## 2019-05-30 ENCOUNTER — HOSPITAL ENCOUNTER (OUTPATIENT)
Age: 84
Setting detail: SPECIMEN
Discharge: HOME | End: 2019-05-30
Payer: COMMERCIAL

## 2019-05-30 LAB
ALT SERPL-CCNC: 16 U/L (ref 5–33)
AST SERPL-CCNC: 25 U/L
CHOLESTEROL/HDL RATIO: 4.1
CHOLESTEROL: 236 MG/DL
HDLC SERPL-MCNC: 57 MG/DL
LDL CHOLESTEROL: 148 MG/DL (ref 0–130)
TOTAL CK: 85 U/L (ref 26–192)
TRIGL SERPL-MCNC: 154 MG/DL
VLDLC SERPL CALC-MCNC: ABNORMAL MG/DL (ref 1–30)

## 2019-06-03 ENCOUNTER — TELEPHONE (OUTPATIENT)
Dept: INTERNAL MEDICINE CLINIC | Age: 84
End: 2019-06-03

## 2019-06-03 RX ORDER — AZITHROMYCIN 250 MG/1
250 TABLET, FILM COATED ORAL SEE ADMIN INSTRUCTIONS
Qty: 6 TABLET | Refills: 0 | Status: SHIPPED | OUTPATIENT
Start: 2019-06-03 | End: 2019-06-08

## 2019-06-03 NOTE — TELEPHONE ENCOUNTER
Sick Call    [place]   9/29/1927   [id number]   Kane Amos MD   Allergies   Allergen Reactions    Dye [Iodides] Hives     IVP dye caused reaction with hives         Last Visit:  05/29/19 Dr Tonya Sawyer    Reason for No Same Day Appt: was just here last week    Complaint: coughing up a lot of mucous--slightly colored--Saw her Cardiologist today and he did not hear anything in her lungs but suggested she leave a message with PCP to see if a chest xray should be ordered and if something should be prescribed. Pharmacy: [facility] patient but this needs addressed today.

## 2019-06-06 DIAGNOSIS — I50.32 CHRONIC DIASTOLIC HEART FAILURE (HCC): Chronic | ICD-10-CM

## 2019-06-09 ENCOUNTER — APPOINTMENT (OUTPATIENT)
Dept: GENERAL RADIOLOGY | Age: 84
End: 2019-06-09
Payer: COMMERCIAL

## 2019-06-09 ENCOUNTER — HOSPITAL ENCOUNTER (EMERGENCY)
Age: 84
Discharge: HOME | End: 2019-06-09
Attending: EMERGENCY MEDICINE
Payer: COMMERCIAL

## 2019-06-09 VITALS
RESPIRATION RATE: 19 BRPM | HEART RATE: 73 BPM | HEIGHT: 59 IN | WEIGHT: 178 LBS | SYSTOLIC BLOOD PRESSURE: 149 MMHG | DIASTOLIC BLOOD PRESSURE: 65 MMHG | OXYGEN SATURATION: 98 % | BODY MASS INDEX: 35.88 KG/M2 | TEMPERATURE: 97.9 F

## 2019-06-09 DIAGNOSIS — Z87.19 HISTORY OF DYSPHAGIA: Primary | ICD-10-CM

## 2019-06-09 PROCEDURE — 71046 X-RAY EXAM CHEST 2 VIEWS: CPT

## 2019-06-09 PROCEDURE — 99284 EMERGENCY DEPT VISIT MOD MDM: CPT

## 2019-06-09 ASSESSMENT — ENCOUNTER SYMPTOMS
SHORTNESS OF BREATH: 0
COUGH: 1
ABDOMINAL PAIN: 0

## 2019-06-09 NOTE — ED PROVIDER NOTES
[address] ED  eMERGENCY dEPARTMENT eNCOUnter   Attending Attestation     Pt Name: Ji Hartman  MRN: [id number]  [place] 9/29/1927  Date of evaluation: 6/9/19       Ji Hartman is a 80 y.o. female who presents with Dysphagia      History:   Patient has had complaints of dysphagia. Patient states that she feels like stuff is stuck in her throat all the time. Patient has had this for some time and there is some concern that maybe a couple days ago she felt something got stuck. Patient does struggle to swallow sometimes but otherwise has not been drooling as no airway compromise and is able to continue eating and drinking. Patient has had an EGD in the past.    Exam: Vitals:   Vitals:    06/09/19 1558   BP: (!) 149/65   Pulse: 73   Resp: 19   Temp: 97.9 °F (36.6 °C)   TempSrc: Oral   SpO2: 98%   Weight: 178 lb (80.7 kg)   Height: 4' 11\" (1.499 m)         I performed a history and physical examination of the patient and discussed management with the resident. I reviewed the residents note and agree with the documented findings and plan of care. Any areas of disagreement are noted on the chart. I was personally present for the key portions of any procedures. I have documented in the chart those procedures where I was not present during the key portions. I have personally reviewed all images and agree with the resident's interpretation. I have reviewed the emergency nurses triage note. I agree with the chief complaint, past medical history, past surgical history, allergies, medications, social and family history as documented unless otherwise noted below. Documentation of the HPI, Physical Exam and Medical Decision Making performed by medical students or scribes is based on my personal performance of the HPI, PE and MDM. I personally evaluated and examined the patient in conjunction with the APC and agree with the assessment, treatment plan, and disposition of the patient as recorded by the APC. Additional findings are as noted.     Alejandro Fowler MD  Attending Emergency  Physician              Chastity Monique MD  06/09/19 6782

## 2019-06-10 ENCOUNTER — TELEPHONE (OUTPATIENT)
Dept: INTERNAL MEDICINE CLINIC | Age: 84
End: 2019-06-10

## 2019-06-11 RX ORDER — BUMETANIDE 1 MG/1
1 TABLET ORAL DAILY
Qty: 60 TABLET | Refills: 2 | Status: SHIPPED | OUTPATIENT
Start: 2019-06-11 | End: 2019-07-08 | Stop reason: DRUGHIGH

## 2019-06-13 ENCOUNTER — OFFICE VISIT (OUTPATIENT)
Dept: GASTROENTEROLOGY | Age: 84
End: 2019-06-13
Payer: COMMERCIAL

## 2019-06-13 VITALS
WEIGHT: 177.8 LBS | DIASTOLIC BLOOD PRESSURE: 60 MMHG | HEART RATE: 82 BPM | BODY MASS INDEX: 35.91 KG/M2 | SYSTOLIC BLOOD PRESSURE: 123 MMHG

## 2019-06-13 DIAGNOSIS — Q39.6 ESOPHAGEAL DIVERTICULUM: ICD-10-CM

## 2019-06-13 DIAGNOSIS — R13.19 ESOPHAGEAL DYSPHAGIA: Primary | ICD-10-CM

## 2019-06-13 PROCEDURE — 99214 OFFICE O/P EST MOD 30 MIN: CPT | Performed by: INTERNAL MEDICINE

## 2019-06-13 ASSESSMENT — ENCOUNTER SYMPTOMS
VOMITING: 0
SHORTNESS OF BREATH: 1
ABDOMINAL PAIN: 0
DIARRHEA: 0
CHOKING: 1
NAUSEA: 0
CONSTIPATION: 1
EYES NEGATIVE: 1
TROUBLE SWALLOWING: 1
BLOOD IN STOOL: 0
BACK PAIN: 1
RECTAL PAIN: 0
ABDOMINAL DISTENTION: 0
ANAL BLEEDING: 0
COUGH: 1

## 2019-06-13 NOTE — PROGRESS NOTES
Subjective:      Patient ID: [place] is a 80 y.o. female. HPI    Dr. Maylene Ormond, MD our mutual patient [place] was seen  for   1. Esophageal dysphagia    2. Esophageal diverticulum     . She is seen in the office with two daughters  She had EGD in past and had a Developing diverticulum  MRI was negative  Swallowing  Has some thick secretions  Has clearing of the throat  No bleeding  No melena      Past Medical, Family, and Social History reviewed and does contribute to the patient presenting condition. patient\"s PMH/PSH,SH,PSYCH hx, MEDs, ALLERGIES, and ROS was all reviewed and updated ion the appropriate sections    Review of Systems   HENT: Positive for trouble swallowing (ok with food and liquid, trouble with flem). Eyes: Negative. Respiratory: Positive for cough, choking (on flem) and shortness of breath. Cardiovascular: Positive for leg swelling. Gastrointestinal: Positive for constipation (off and on). Negative for abdominal distention, abdominal pain, anal bleeding, blood in stool, diarrhea, nausea, rectal pain and vomiting. Endocrine: Negative. Genitourinary: Positive for frequency and urgency. Musculoskeletal: Positive for arthralgias, back pain, gait problem and neck pain. Skin: Negative. Allergic/Immunologic: Positive for environmental allergies (dust and smoke). Neurological: Positive for weakness and light-headedness. Hematological: Bruises/bleeds easily. Psychiatric/Behavioral: Positive for sleep disturbance. Reviewed and agree  Objective:   Physical Exam   Constitutional: She is oriented to person, place, and time. She appears well-developed and well-nourished. Elderly anxious     HENT:   Head: Normocephalic and atraumatic. Mouth/Throat: Oropharynx is clear and moist.   Eyes: Pupils are equal, round, and reactive to light. Conjunctivae are normal.   Neck: Normal range of motion. Neck supple.    Cardiovascular: Normal heart sounds and intact distal pulses. Pulmonary/Chest: Effort normal. She has rales. Abdominal: Soft. Bowel sounds are normal.   Musculoskeletal: Normal range of motion. She exhibits edema. Neurological: She is alert and oriented to person, place, and time. Skin: Skin is warm. Psychiatric: Her behavior is normal.   Vitals reviewed. Assessment:      Patient Active Problem List   Diagnosis Code    Depression F32.9    Osteoarthritis M19.90    CKD (chronic kidney disease) stage 4, GFR 15-29 ml/min ([facility]) N18.4    Primary osteoarthritis involving multiple joints M15.0    GERD (gastroesophageal reflux disease) K21.9    History of total abdominal hysterectomy Z90.710    History of skin cancer Z85.828    Sleep apnea G47.30    HTN (hypertension) I10    Acquired hypothyroidism E03.9    Unilateral inguinal hernia without obstruction or gangrene K40.90    Coronary artery disease involving native coronary artery of native heart with unstable angina pectoris ([facility]) I25.110    Chronic diastolic heart failure ([facility]) I50.32    Hordeolum externum of right upper eyelid H00.011    Esophageal dysphagia R13.10    Constipation K59.00    MI (myocardial infarction) ([facility] I21.9    Hypothyroidism E03.9    COPD (chronic obstructive pulmonary disease) ([facility]) J44.9    Chronic kidney disease N18.9    CHF (congestive heart failure) ([facility]) I50.9    Leg wound, right, initial encounter S81.801A    Throat discomfort R07.0    CHF (congestive heart failure), NYHA class I, acute on chronic, combined ([facility]) I50.43           Plan:      Refer to Dr Yessi Parker ENT    Seems PND    Anti allergies may help    Pt was asked to chew food well  Take time in eating  Sit up or prop up when eating  Don't talk when eating  Walk after finish eating  Use liquids with meals if has issues  The patient has verbalized understanding and agreemenet to this.     Ranitidine of PRN anti acid    More than half of patient's clinic visit time was spent in counseling

## 2019-06-14 ENCOUNTER — TELEPHONE (OUTPATIENT)
Dept: GASTROENTEROLOGY | Age: 84
End: 2019-06-14

## 2019-06-14 DIAGNOSIS — Q39.6 ESOPHAGEAL DIVERTICULUM: ICD-10-CM

## 2019-06-14 DIAGNOSIS — K21.9 GASTROESOPHAGEAL REFLUX DISEASE WITHOUT ESOPHAGITIS: Chronic | ICD-10-CM

## 2019-06-14 DIAGNOSIS — R07.0 THROAT DISCOMFORT: ICD-10-CM

## 2019-06-14 DIAGNOSIS — R13.19 ESOPHAGEAL DYSPHAGIA: Primary | ICD-10-CM

## 2019-06-16 RX ORDER — GUAIFENESIN 600 MG/1
600 TABLET, EXTENDED RELEASE ORAL 2 TIMES DAILY
Qty: 60 TABLET | Refills: 2 | Status: SHIPPED | OUTPATIENT
Start: 2019-06-16 | End: 2019-07-08

## 2019-06-24 RX ORDER — POTASSIUM CHLORIDE 20 MEQ/1
20 TABLET, EXTENDED RELEASE ORAL DAILY
Qty: 90 TABLET | Refills: 1 | Status: ON HOLD | OUTPATIENT
Start: 2019-06-24 | End: 2019-11-22 | Stop reason: HOSPADM

## 2019-06-24 RX ORDER — SERTRALINE HYDROCHLORIDE 100 MG/1
100 TABLET, FILM COATED ORAL DAILY
Qty: 90 TABLET | Refills: 3 | Status: SHIPPED | OUTPATIENT
Start: 2019-06-24 | End: 2019-07-08 | Stop reason: DRUGHIGH

## 2019-07-08 ENCOUNTER — HOSPITAL ENCOUNTER (OUTPATIENT)
Age: 84
Setting detail: OBSERVATION
Discharge: NURSING, REHAB OR LONG-TERM CARE | End: 2019-07-12
Attending: EMERGENCY MEDICINE | Admitting: INTERNAL MEDICINE
Payer: COMMERCIAL

## 2019-07-08 ENCOUNTER — APPOINTMENT (OUTPATIENT)
Dept: GENERAL RADIOLOGY | Age: 84
End: 2019-07-08
Payer: COMMERCIAL

## 2019-07-08 DIAGNOSIS — E87.70 HYPERVOLEMIA, UNSPECIFIED HYPERVOLEMIA TYPE: Primary | ICD-10-CM

## 2019-07-08 PROBLEM — I50.9 CONGESTIVE HEART FAILURE (HCC): Status: ACTIVE | Noted: 2019-07-08

## 2019-07-08 LAB
-: NORMAL
ABSOLUTE EOS #: 0.4 K/UL (ref 0–0.4)
ABSOLUTE IMMATURE GRANULOCYTE: ABNORMAL K/UL (ref 0–0.3)
ABSOLUTE LYMPH #: 1 K/UL (ref 1–4.8)
ABSOLUTE MONO #: 0.5 K/UL (ref 0.1–1.3)
AMORPHOUS: NORMAL
ANION GAP SERPL CALCULATED.3IONS-SCNC: 13 MMOL/L (ref 9–17)
BACTERIA: NORMAL
BASOPHILS # BLD: 1 % (ref 0–2)
BASOPHILS ABSOLUTE: 0.1 K/UL (ref 0–0.2)
BILIRUBIN URINE: NEGATIVE
BNP INTERPRETATION: ABNORMAL
BUN BLDV-MCNC: 30 MG/DL (ref 8–23)
BUN/CREAT BLD: ABNORMAL (ref 9–20)
CALCIUM SERPL-MCNC: 8.8 MG/DL (ref 8.6–10.4)
CASTS UA: NORMAL /LPF
CHLORIDE BLD-SCNC: 99 MMOL/L (ref 98–107)
CO2: 27 MMOL/L (ref 20–31)
COLOR: YELLOW
COMMENT UA: NORMAL
COMMENT UA: NORMAL
CREAT SERPL-MCNC: 1.56 MG/DL (ref 0.5–0.9)
CRYSTALS, UA: NORMAL /HPF
DIFFERENTIAL TYPE: ABNORMAL
EOSINOPHILS RELATIVE PERCENT: 8 % (ref 0–4)
EPITHELIAL CELLS UA: NORMAL /HPF
GFR AFRICAN AMERICAN: 38 ML/MIN
GFR NON-AFRICAN AMERICAN: 31 ML/MIN
GFR SERPL CREATININE-BSD FRML MDRD: ABNORMAL ML/MIN/{1.73_M2}
GFR SERPL CREATININE-BSD FRML MDRD: ABNORMAL ML/MIN/{1.73_M2}
GLUCOSE BLD-MCNC: 104 MG/DL (ref 70–99)
GLUCOSE URINE: NEGATIVE
HCT VFR BLD CALC: 34.6 % (ref 36–46)
HEMOGLOBIN: 11.8 G/DL (ref 12–16)
IMMATURE GRANULOCYTES: ABNORMAL %
KETONES, URINE: NEGATIVE
LEUKOCYTE ESTERASE, URINE: NEGATIVE
LYMPHOCYTES # BLD: 22 % (ref 24–44)
MCH RBC QN AUTO: 31.8 PG (ref 26–34)
MCHC RBC AUTO-ENTMCNC: 34.1 G/DL (ref 31–37)
MCV RBC AUTO: 93.2 FL (ref 80–100)
MONOCYTES # BLD: 12 % (ref 1–7)
MUCUS: NORMAL
NITRITE, URINE: NEGATIVE
NRBC AUTOMATED: ABNORMAL PER 100 WBC
OTHER OBSERVATIONS UA: NORMAL
PDW BLD-RTO: 14.9 % (ref 11.5–14.9)
PH UA: 6 (ref 5–8)
PLATELET # BLD: 258 K/UL (ref 150–450)
PLATELET ESTIMATE: ABNORMAL
PMV BLD AUTO: 7.2 FL (ref 6–12)
POTASSIUM SERPL-SCNC: 4.1 MMOL/L (ref 3.7–5.3)
PRO-BNP: 643 PG/ML
PROTEIN UA: NEGATIVE
RBC # BLD: 3.71 M/UL (ref 4–5.2)
RBC # BLD: ABNORMAL 10*6/UL
RBC UA: NORMAL /HPF
RENAL EPITHELIAL, UA: NORMAL /HPF
SEG NEUTROPHILS: 57 % (ref 36–66)
SEGMENTED NEUTROPHILS ABSOLUTE COUNT: 2.7 K/UL (ref 1.3–9.1)
SODIUM BLD-SCNC: 139 MMOL/L (ref 135–144)
SPECIFIC GRAVITY UA: 1.01 (ref 1–1.03)
TRICHOMONAS: NORMAL
TROPONIN INTERP: ABNORMAL
TROPONIN INTERP: ABNORMAL
TROPONIN T: ABNORMAL NG/ML
TROPONIN T: ABNORMAL NG/ML
TROPONIN, HIGH SENSITIVITY: 24 NG/L (ref 0–14)
TROPONIN, HIGH SENSITIVITY: 25 NG/L (ref 0–14)
TURBIDITY: CLEAR
URINE HGB: NEGATIVE
UROBILINOGEN, URINE: NORMAL
WBC # BLD: 4.7 K/UL (ref 3.5–11)
WBC # BLD: ABNORMAL 10*3/UL
WBC UA: NORMAL /HPF
YEAST: NORMAL

## 2019-07-08 PROCEDURE — 80048 BASIC METABOLIC PNL TOTAL CA: CPT

## 2019-07-08 PROCEDURE — 83880 ASSAY OF NATRIURETIC PEPTIDE: CPT

## 2019-07-08 PROCEDURE — 36415 COLL VENOUS BLD VENIPUNCTURE: CPT

## 2019-07-08 PROCEDURE — 2580000003 HC RX 258: Performed by: INTERNAL MEDICINE

## 2019-07-08 PROCEDURE — 6370000000 HC RX 637 (ALT 250 FOR IP): Performed by: INTERNAL MEDICINE

## 2019-07-08 PROCEDURE — G0378 HOSPITAL OBSERVATION PER HR: HCPCS

## 2019-07-08 PROCEDURE — 6370000000 HC RX 637 (ALT 250 FOR IP): Performed by: EMERGENCY MEDICINE

## 2019-07-08 PROCEDURE — 2500000003 HC RX 250 WO HCPCS: Performed by: EMERGENCY MEDICINE

## 2019-07-08 PROCEDURE — 96374 THER/PROPH/DIAG INJ IV PUSH: CPT

## 2019-07-08 PROCEDURE — 2500000003 HC RX 250 WO HCPCS: Performed by: INTERNAL MEDICINE

## 2019-07-08 PROCEDURE — 81001 URINALYSIS AUTO W/SCOPE: CPT

## 2019-07-08 PROCEDURE — 71045 X-RAY EXAM CHEST 1 VIEW: CPT

## 2019-07-08 PROCEDURE — 85025 COMPLETE CBC W/AUTO DIFF WBC: CPT

## 2019-07-08 PROCEDURE — 84484 ASSAY OF TROPONIN QUANT: CPT

## 2019-07-08 PROCEDURE — 96376 TX/PRO/DX INJ SAME DRUG ADON: CPT

## 2019-07-08 PROCEDURE — 93005 ELECTROCARDIOGRAM TRACING: CPT | Performed by: EMERGENCY MEDICINE

## 2019-07-08 PROCEDURE — 99285 EMERGENCY DEPT VISIT HI MDM: CPT

## 2019-07-08 RX ORDER — BUMETANIDE 0.25 MG/ML
1 INJECTION, SOLUTION INTRAMUSCULAR; INTRAVENOUS ONCE
Status: COMPLETED | OUTPATIENT
Start: 2019-07-08 | End: 2019-07-08

## 2019-07-08 RX ORDER — SERTRALINE HYDROCHLORIDE 100 MG/1
100 TABLET, FILM COATED ORAL NIGHTLY
COMMUNITY
End: 2020-02-11 | Stop reason: SINTOL

## 2019-07-08 RX ORDER — LANOLIN ALCOHOL/MO/W.PET/CERES
1000 CREAM (GRAM) TOPICAL DAILY
Status: DISCONTINUED | OUTPATIENT
Start: 2019-07-08 | End: 2019-07-12 | Stop reason: HOSPADM

## 2019-07-08 RX ORDER — ONDANSETRON 2 MG/ML
4 INJECTION INTRAMUSCULAR; INTRAVENOUS EVERY 6 HOURS PRN
Status: DISCONTINUED | OUTPATIENT
Start: 2019-07-08 | End: 2019-07-12 | Stop reason: HOSPADM

## 2019-07-08 RX ORDER — SODIUM CHLORIDE 0.9 % (FLUSH) 0.9 %
10 SYRINGE (ML) INJECTION PRN
Status: DISCONTINUED | OUTPATIENT
Start: 2019-07-08 | End: 2019-07-12 | Stop reason: HOSPADM

## 2019-07-08 RX ORDER — BUMETANIDE 1 MG/1
3 TABLET ORAL 2 TIMES DAILY
Status: ON HOLD | COMMUNITY
End: 2019-11-22 | Stop reason: HOSPADM

## 2019-07-08 RX ORDER — ASPIRIN 81 MG/1
81 TABLET ORAL DAILY
Status: DISCONTINUED | OUTPATIENT
Start: 2019-07-08 | End: 2019-07-12 | Stop reason: HOSPADM

## 2019-07-08 RX ORDER — SODIUM CHLORIDE 0.9 % (FLUSH) 0.9 %
10 SYRINGE (ML) INJECTION EVERY 12 HOURS SCHEDULED
Status: DISCONTINUED | OUTPATIENT
Start: 2019-07-08 | End: 2019-07-12 | Stop reason: HOSPADM

## 2019-07-08 RX ORDER — METOLAZONE 2.5 MG/1
2.5 TABLET ORAL DAILY
Status: DISCONTINUED | OUTPATIENT
Start: 2019-07-08 | End: 2019-07-09

## 2019-07-08 RX ORDER — SERTRALINE HYDROCHLORIDE 100 MG/1
100 TABLET, FILM COATED ORAL NIGHTLY
Status: DISCONTINUED | OUTPATIENT
Start: 2019-07-08 | End: 2019-07-12 | Stop reason: HOSPADM

## 2019-07-08 RX ORDER — RANOLAZINE 500 MG/1
500 TABLET, EXTENDED RELEASE ORAL 2 TIMES DAILY
Status: DISCONTINUED | OUTPATIENT
Start: 2019-07-08 | End: 2019-07-12 | Stop reason: HOSPADM

## 2019-07-08 RX ORDER — FLUTICASONE PROPIONATE 50 MCG
1 SPRAY, SUSPENSION (ML) NASAL DAILY
Status: DISCONTINUED | OUTPATIENT
Start: 2019-07-08 | End: 2019-07-12 | Stop reason: HOSPADM

## 2019-07-08 RX ORDER — POTASSIUM CHLORIDE 20 MEQ/1
20 TABLET, EXTENDED RELEASE ORAL DAILY
Status: DISCONTINUED | OUTPATIENT
Start: 2019-07-08 | End: 2019-07-12 | Stop reason: HOSPADM

## 2019-07-08 RX ORDER — FAMOTIDINE 20 MG/1
20 TABLET, FILM COATED ORAL 2 TIMES DAILY
COMMUNITY
End: 2019-10-10

## 2019-07-08 RX ORDER — PRIMIDONE 50 MG/1
50 TABLET ORAL 3 TIMES DAILY
Status: DISCONTINUED | OUTPATIENT
Start: 2019-07-08 | End: 2019-07-12 | Stop reason: HOSPADM

## 2019-07-08 RX ORDER — NITROGLYCERIN 0.4 MG/1
0.4 TABLET SUBLINGUAL EVERY 5 MIN PRN
Status: DISCONTINUED | OUTPATIENT
Start: 2019-07-08 | End: 2019-07-12 | Stop reason: HOSPADM

## 2019-07-08 RX ORDER — BUMETANIDE 0.25 MG/ML
1 INJECTION, SOLUTION INTRAMUSCULAR; INTRAVENOUS EVERY 8 HOURS
Status: DISCONTINUED | OUTPATIENT
Start: 2019-07-08 | End: 2019-07-09

## 2019-07-08 RX ORDER — LEVOTHYROXINE SODIUM 0.1 MG/1
100 TABLET ORAL DAILY
Status: DISCONTINUED | OUTPATIENT
Start: 2019-07-09 | End: 2019-07-12 | Stop reason: HOSPADM

## 2019-07-08 RX ORDER — FAMOTIDINE 20 MG/1
20 TABLET, FILM COATED ORAL DAILY
Status: DISCONTINUED | OUTPATIENT
Start: 2019-07-08 | End: 2019-07-12 | Stop reason: HOSPADM

## 2019-07-08 RX ORDER — DOCUSATE SODIUM 100 MG/1
100 CAPSULE, LIQUID FILLED ORAL DAILY
Status: DISCONTINUED | OUTPATIENT
Start: 2019-07-08 | End: 2019-07-12 | Stop reason: HOSPADM

## 2019-07-08 RX ADMIN — SERTRALINE HYDROCHLORIDE 100 MG: 100 TABLET, FILM COATED ORAL at 22:02

## 2019-07-08 RX ADMIN — Medication 10 ML: at 22:00

## 2019-07-08 RX ADMIN — METOPROLOL TARTRATE 12.5 MG: 25 TABLET ORAL at 22:21

## 2019-07-08 RX ADMIN — RANOLAZINE 500 MG: 500 TABLET, EXTENDED RELEASE ORAL at 22:00

## 2019-07-08 RX ADMIN — PRIMIDONE 50 MG: 50 TABLET ORAL at 22:01

## 2019-07-08 RX ADMIN — BUMETANIDE 1 MG: 0.25 INJECTION INTRAMUSCULAR; INTRAVENOUS at 21:59

## 2019-07-08 RX ADMIN — BUMETANIDE 1 MG: 0.25 INJECTION INTRAMUSCULAR; INTRAVENOUS at 19:30

## 2019-07-08 RX ADMIN — NITROGLYCERIN 0.4 MG: 0.4 TABLET SUBLINGUAL at 15:17

## 2019-07-08 ASSESSMENT — ENCOUNTER SYMPTOMS
SORE THROAT: 0
TROUBLE SWALLOWING: 0
CONSTIPATION: 0
ABDOMINAL PAIN: 0
BACK PAIN: 0
COUGH: 0
NAUSEA: 0
COLOR CHANGE: 0
VOMITING: 0
DIARRHEA: 0
BLOOD IN STOOL: 0
SHORTNESS OF BREATH: 1

## 2019-07-08 NOTE — ED NOTES
Pt and family updated; pt resting quietly with eyes closed; resp even unlaored     Francesco Candelario RN  07/08/19 5838

## 2019-07-09 LAB
ANION GAP SERPL CALCULATED.3IONS-SCNC: 13 MMOL/L (ref 9–17)
BUN BLDV-MCNC: 26 MG/DL (ref 8–23)
BUN/CREAT BLD: ABNORMAL (ref 9–20)
CALCIUM SERPL-MCNC: 8.8 MG/DL (ref 8.6–10.4)
CHLORIDE BLD-SCNC: 98 MMOL/L (ref 98–107)
CO2: 26 MMOL/L (ref 20–31)
CREAT SERPL-MCNC: 1.61 MG/DL (ref 0.5–0.9)
EKG ATRIAL RATE: 74 BPM
EKG P AXIS: 46 DEGREES
EKG P-R INTERVAL: 150 MS
EKG Q-T INTERVAL: 422 MS
EKG QRS DURATION: 82 MS
EKG QTC CALCULATION (BAZETT): 468 MS
EKG R AXIS: -4 DEGREES
EKG T AXIS: 28 DEGREES
EKG VENTRICULAR RATE: 74 BPM
GFR AFRICAN AMERICAN: 36 ML/MIN
GFR NON-AFRICAN AMERICAN: 30 ML/MIN
GFR SERPL CREATININE-BSD FRML MDRD: ABNORMAL ML/MIN/{1.73_M2}
GFR SERPL CREATININE-BSD FRML MDRD: ABNORMAL ML/MIN/{1.73_M2}
GLUCOSE BLD-MCNC: 127 MG/DL (ref 70–99)
POTASSIUM SERPL-SCNC: 3.8 MMOL/L (ref 3.7–5.3)
SODIUM BLD-SCNC: 137 MMOL/L (ref 135–144)

## 2019-07-09 PROCEDURE — 2500000003 HC RX 250 WO HCPCS: Performed by: NURSE PRACTITIONER

## 2019-07-09 PROCEDURE — 2500000003 HC RX 250 WO HCPCS: Performed by: INTERNAL MEDICINE

## 2019-07-09 PROCEDURE — 96376 TX/PRO/DX INJ SAME DRUG ADON: CPT

## 2019-07-09 PROCEDURE — 36415 COLL VENOUS BLD VENIPUNCTURE: CPT

## 2019-07-09 PROCEDURE — 2580000003 HC RX 258: Performed by: INTERNAL MEDICINE

## 2019-07-09 PROCEDURE — 99220 PR INITIAL OBSERVATION CARE/DAY 70 MINUTES: CPT | Performed by: INTERNAL MEDICINE

## 2019-07-09 PROCEDURE — 6360000002 HC RX W HCPCS: Performed by: INTERNAL MEDICINE

## 2019-07-09 PROCEDURE — 93970 EXTREMITY STUDY: CPT

## 2019-07-09 PROCEDURE — 96372 THER/PROPH/DIAG INJ SC/IM: CPT

## 2019-07-09 PROCEDURE — 80048 BASIC METABOLIC PNL TOTAL CA: CPT

## 2019-07-09 PROCEDURE — G0378 HOSPITAL OBSERVATION PER HR: HCPCS

## 2019-07-09 PROCEDURE — 6370000000 HC RX 637 (ALT 250 FOR IP): Performed by: INTERNAL MEDICINE

## 2019-07-09 PROCEDURE — 93010 ELECTROCARDIOGRAM REPORT: CPT | Performed by: INTERNAL MEDICINE

## 2019-07-09 RX ORDER — BUMETANIDE 0.25 MG/ML
1 INJECTION, SOLUTION INTRAMUSCULAR; INTRAVENOUS 3 TIMES DAILY
Status: DISCONTINUED | OUTPATIENT
Start: 2019-07-09 | End: 2019-07-10

## 2019-07-09 RX ORDER — METOLAZONE 2.5 MG/1
2.5 TABLET ORAL DAILY
Status: DISCONTINUED | OUTPATIENT
Start: 2019-07-10 | End: 2019-07-10

## 2019-07-09 RX ORDER — BUMETANIDE 0.25 MG/ML
1 INJECTION, SOLUTION INTRAMUSCULAR; INTRAVENOUS 2 TIMES DAILY
Status: DISCONTINUED | OUTPATIENT
Start: 2019-07-09 | End: 2019-07-09

## 2019-07-09 RX ADMIN — METOLAZONE 2.5 MG: 2.5 TABLET ORAL at 08:41

## 2019-07-09 RX ADMIN — CYANOCOBALAMIN TAB 1000 MCG 1000 MCG: 1000 TAB at 08:41

## 2019-07-09 RX ADMIN — Medication 10 ML: at 21:24

## 2019-07-09 RX ADMIN — Medication 1 SPRAY: at 08:41

## 2019-07-09 RX ADMIN — MICONAZOLE NITRATE: 20.6 POWDER TOPICAL at 08:42

## 2019-07-09 RX ADMIN — MICONAZOLE NITRATE: 20.6 POWDER TOPICAL at 21:24

## 2019-07-09 RX ADMIN — POTASSIUM CHLORIDE 20 MEQ: 20 TABLET, EXTENDED RELEASE ORAL at 08:41

## 2019-07-09 RX ADMIN — Medication 10 ML: at 08:41

## 2019-07-09 RX ADMIN — PRIMIDONE 50 MG: 50 TABLET ORAL at 21:24

## 2019-07-09 RX ADMIN — ENOXAPARIN SODIUM 30 MG: 30 INJECTION SUBCUTANEOUS at 08:41

## 2019-07-09 RX ADMIN — MAGNESIUM HYDROXIDE 30 ML: 400 SUSPENSION ORAL at 21:39

## 2019-07-09 RX ADMIN — DOCUSATE SODIUM 100 MG: 100 CAPSULE, LIQUID FILLED ORAL at 08:41

## 2019-07-09 RX ADMIN — BUMETANIDE 1 MG: 0.25 INJECTION INTRAMUSCULAR; INTRAVENOUS at 14:40

## 2019-07-09 RX ADMIN — PRIMIDONE 50 MG: 50 TABLET ORAL at 08:41

## 2019-07-09 RX ADMIN — FAMOTIDINE 20 MG: 20 TABLET, FILM COATED ORAL at 08:41

## 2019-07-09 RX ADMIN — ASPIRIN 81 MG: 81 TABLET ORAL at 08:41

## 2019-07-09 RX ADMIN — BUMETANIDE 1 MG: 0.25 INJECTION INTRAMUSCULAR; INTRAVENOUS at 05:26

## 2019-07-09 RX ADMIN — METOPROLOL TARTRATE 12.5 MG: 25 TABLET ORAL at 21:23

## 2019-07-09 RX ADMIN — RANOLAZINE 500 MG: 500 TABLET, EXTENDED RELEASE ORAL at 21:24

## 2019-07-09 RX ADMIN — RANOLAZINE 500 MG: 500 TABLET, EXTENDED RELEASE ORAL at 08:41

## 2019-07-09 RX ADMIN — SERTRALINE HYDROCHLORIDE 100 MG: 100 TABLET, FILM COATED ORAL at 21:24

## 2019-07-09 RX ADMIN — LEVOTHYROXINE SODIUM 100 MCG: 0.1 TABLET ORAL at 05:26

## 2019-07-09 RX ADMIN — PRIMIDONE 50 MG: 50 TABLET ORAL at 14:40

## 2019-07-09 RX ADMIN — BUMETANIDE 1 MG: 0.25 INJECTION INTRAMUSCULAR; INTRAVENOUS at 21:23

## 2019-07-09 ASSESSMENT — ENCOUNTER SYMPTOMS
CONSTIPATION: 0
WHEEZING: 0
STRIDOR: 0
VOMITING: 0
DIARRHEA: 0
SHORTNESS OF BREATH: 1
NAUSEA: 0
COUGH: 0

## 2019-07-09 NOTE — CARE COORDINATION
[address] Transitions Interview     2019    Patient: Gilbert Morel Patient : 1927   MRN: [id number]  Reason for Admission: CHF  RARS: Readmission Risk Score: 0         Spoke with: Noelle Saleh, palliative care to see patient, patient observation status at this time, will continue to follow for discharge plan//JU      Readmission Risk  Patient Active Problem List   Diagnosis    Depression    Osteoarthritis    CKD (chronic kidney disease) stage 4, GFR 15-29 ml/min (HCC)    Primary osteoarthritis involving multiple joints    GERD (gastroesophageal reflux disease)    History of total abdominal hysterectomy    History of skin cancer    Sleep apnea    HTN (hypertension)    Acquired hypothyroidism    Unilateral inguinal hernia without obstruction or gangrene    Coronary artery disease involving native coronary artery of native heart with unstable angina pectoris (HCC)    Chronic diastolic heart failure ([facility]    Hordeolum externum of right upper eyelid    Esophageal dysphagia    Constipation    MI (myocardial infarction) ([address]    Hypothyroidism    COPD (chronic obstructive pulmonary disease) ([address]    Chronic kidney disease    CHF (congestive heart failure) ([address]    Leg wound, right, initial encounter    Throat discomfort    CHF (congestive heart failure), NYHA class I, acute on chronic, combined ([address]    Congestive heart failure [facility])       Inpatient Assessment  Care Transitions Summary    Care Transitions Inpatient Review  Medication Review  Do you have all of your prescriptions and are they filled?:  Yes   Housing Review  Social Support  Durable Medical Equipment  Functional Review  Hearing and Vision  Care Transitions Interventions         Follow Up  Future Appointments   Date Time Provider Anika Celaya   2019  3:10 PM Cristobal Osorio MD [facility] None   10/9/2019  1:15 PM Kary Kimball MD [address]   2019  3:15 PM Vernon Heredia MD [facility] [address] Maintenance  There are no preventive care reminders to display for this patient.     Amina Keith RN

## 2019-07-09 NOTE — PLAN OF CARE
Problem: HEMODYNAMIC STATUS  Goal: Patient has stable vital signs and fluid balance  Outcome: Met This Shift     Problem: FLUID AND ELECTROLYTE IMBALANCE  Goal: Fluid and electrolyte balance are achieved/maintained  Outcome: Met This Shift     Problem: Risk for Impaired Skin Integrity  Goal: Tissue integrity - skin and mucous membranes  Description  Structural intactness and normal physiological function of skin and  mucous membranes.   Outcome: Ongoing     Problem: ACTIVITY INTOLERANCE/IMPAIRED MOBILITY  Goal: Mobility/activity is maintained at optimum level for patient  Outcome: Ongoing

## 2019-07-09 NOTE — CONSULTS
Attestation signed by      Attending Physician Statement:    I have discussed the care of  Gisela Gentile , including pertinent history and exam findings, with the Cardiology fellow/resident. I have seen and examined the patient and the key elements of all parts of the encounter have been performed by me. I agree with the assessment, plan and orders as documented by the fellow/resident, after I modified exam findings and plan of treatments, and the final version is my approved version of the assessment. Additional Comments:   Acute on chronic Diastolic HF, HFpEF- improving  - continue Bumex 1 mg tid  - agree with metalozone 2.5 mg qd  - monitor Is/Os. - family would like to speak to  regarding possible placement. - potentially home tomorrow. D/w patient, family, nurse    Thank you for allowing me to participate in the care of this patient, please do not hesitate to call if you have any questions. Guillermo Rock DO, [facility] [place], [place] [address] [facility]  [facility] [employer]  [phone]     [facility] Cardiology Cardiology    Consult / H&P               Today's Date: 7/9/2019  Patient Name: Gisela Gentile  Date of admission: 7/8/2019  1:45 PM  Patient's age: 80 y.o., 9/29/1927  Admission Dx: Congestive heart failure, unspecified HF chronicity, unspecified heart failure type ([facility] [address] [I50.9]    Reason for Consult:  Cardiac evaluation    Requesting Physician: Pa Ch MD    CHIEF COMPLAINT:  Increased swelling in bilateral legs     History Obtained From:  patient    HISTORY OF PRESENT ILLNESS:      The patient is a 80 y.o.  female who is admitted to the hospital for Congestive Heart Failure  Patient presents for re-evaluation of congestive heart failure. Patient's current complaints are worsening lower extremity edema over the past few days associated with worsening dyspnea, productive cough and recent weight gain (~10lbs in short amount of time).  She denies chest

## 2019-07-09 NOTE — H&P
Internal Medicine         History and Physical Examination         NAME:  Dodie Ortiz  MRN:  [id number]  Acct: [de-identified]  : 1927  PCP:Luan Gonzáles MD  Admit date:2019  History Obtained From:  Patient     CHIEF COMPLAINT:  \"shortness of breath and leg swelling\"    HISTORY OF PRESENT ILLNESS:    The patient is a 80 y.o. female with significant past medical history of CHF, COPD, hypertension, depression presented with worsening shortness of breath and lower extremity swelling. As per the patient her legs have been swelling up over the last few days now and she has gained several pounds of weight. She states that she has been compliant with her diuretics. Her last echo was in A. fib , ejection fraction was 65% and RVSP of 24 mmHg. Chest x-ray on this admission was benign. BNP was 643. Creatinine was 1.6 which is at baseline she has a history of CKD. REVIEW OF SYSTEMS:  Review of Systems   Constitutional: Negative. HENT: Negative. Respiratory: Positive for shortness of breath. Negative for cough, wheezing and stridor. Cardiovascular: Positive for leg swelling. Negative for chest pain and palpitations. Gastrointestinal: Negative for constipation, diarrhea, nausea and vomiting. Endocrine: Negative. Musculoskeletal: Negative. Skin: Negative. Neurological: Negative for dizziness, syncope, light-headedness and numbness.        PAST MEDICAL HISTORY:     Past Medical History:   Diagnosis Date    [facility] ([facility] [address]     CHF (congestive heart failure) (HCC)     Chronic kidney disease     COPD (chronic obstructive pulmonary disease) ([facility] [address]     Coronary artery disease involving native coronary artery of native heart with unstable angina pectoris ([facility] [address] 2017    Depression     Esophageal dysphagia     GERD (gastroesophageal reflux disease)     History of skin cancer     nose x3    HTN (hypertension) 2016    Hypothyroidism     MI (myocardial infarction)

## 2019-07-09 NOTE — CARE COORDINATION
KEITH received a request to see this patient and family who was currently present in the room. KEITH did go to speak with them and they daughter was really unclear as to what the best decision for this patient is. She wasn't sure they should \"put her through therapy\" or what the best plan is at this point. KEITH asked the daughter if she would like to first speak to Palliative care Nurse to see what options are available if she would not get therapy. The daughter was very interested in this option. KEITH spoke with Anayeli who is going to see the patient and her daughter. KEITH will continue to follow.

## 2019-07-09 NOTE — CONSULTS
bumetanide (BUMEX) 1 MG tablet Take 3 mg by mouth 2 times daily      metoprolol tartrate (LOPRESSOR) 25 MG tablet Take 12.5 mg by mouth nightly      sertraline (ZOLOFT) 100 MG tablet Take 100 mg by mouth nightly      famotidine (PEPCID) 20 MG tablet Take 20 mg by mouth 2 times daily      cyanocobalamin 1000 MCG tablet Take 1,000 mcg by mouth daily      potassium chloride (KLOR-CON M) 20 MEQ extended release tablet Take 1 tablet by mouth daily 90 tablet 1    primidone (MYSOLINE) 50 MG tablet Take 1 tablet by mouth 3 times daily 90 tablet 3    metolazone (ZAROXOLYN) 2.5 MG tablet Take one tablet every other day 15 tablet 5    nitroGLYCERIN (NITROSTAT) 0.4 MG SL tablet up to max of 3 total doses. If no relief after 1 dose, call 911. 25 tablet 3    fluticasone (FLONASE) 50 MCG/ACT nasal spray 1 spray by Each Nare route daily 1 Spray in each nostril 2 Bottle 1    levothyroxine (SYNTHROID) 100 MCG tablet Take 1 tablet by mouth daily 30 tablet 11    ranolazine (RANEXA) 500 MG extended release tablet Take 500 mg by mouth 2 times daily      docusate sodium (COLACE, DULCOLAX) 100 MG CAPS Take 100 mg by mouth daily 30 capsule 3    aspirin 81 MG EC tablet Take 81 mg by mouth daily       Multiple Vitamins-Minerals (THERAPEUTIC MULTIVITAMIN-MINERALS) tablet Take 1 tablet by mouth daily.          Data         BP (!) 102/45   Pulse 74   Temp 98.7 °F (37.1 °C) (Oral)   Resp 18   Ht 4' 11\" (1.499 m)   Wt 186 lb 4.6 oz (84.5 kg)   LMP  (LMP Unknown)   SpO2 94%   BMI 37.63 kg/m²     Wt Readings from Last 3 Encounters:   07/09/19 186 lb 4.6 oz (84.5 kg)   06/13/19 177 lb 12.8 oz (80.6 kg)   06/09/19 178 lb (80.7 kg)        Code Status: DNR-CCA     ADVANCED CARE PLANNING:  Patient has capacity for medical decisions: yes  Health Care Power of : yes  Living Will: yes     Personal, Social, and Family History  Marital Status:   Living situation:alone  Kallie/Spiritual History:     Psychological Distress: hospice. An order was received for the [facility] and I gave the original copy to the family. The daughters have [facility] for medical as well. I offered emotional support to all. They are all very appreciative for the visit and the information as well as the support. Will follow for goals of care and support. Contact information given to paige Elaine. DNRCC-A  Continue current medical treatment  Provided support for distress of the patient and family  Will follow for support and goals of care. Principle Problem/Diagnosis:  Congestive heart failure, unspecified HF chronicity, unspecified heart failure type ([facility] [address] [I50.9]    Goals of care evaluation   The patient goals of care are improve or maintain function/quality of life   Goals of care discussed with:    [] Patient independently    [x] Patient and Family    [] Family or Healthcare DPOA independently    [] Unable to discuss with patient, family/DPOA not present    Code Status  DNR-CCA    Other recommendations  Please call with any palliative questions or concerns. Palliative Care Team is available via [employer] or via phone. Palliative Care will continue to follow Ms. Amado's care as needed. Thank you for allowing Palliative Care to participate in the care of Ms. Amado .     Electronically signed by   Horacio Paige RN  Palliative Care Team  on 7/9/2019 at 5:33 PM    Palliative care office: [phone]

## 2019-07-09 NOTE — CARE COORDINATION
24%; LUBNA NEEDS SIGNED/COMPLETED//FORREST                 Electronically signed by:  Chao Hemphill RN on 7/9/2019 at 10:00 AM

## 2019-07-10 LAB
ABSOLUTE EOS #: 0.4 K/UL (ref 0–0.4)
ABSOLUTE IMMATURE GRANULOCYTE: ABNORMAL K/UL (ref 0–0.3)
ABSOLUTE LYMPH #: 1.3 K/UL (ref 1–4.8)
ABSOLUTE MONO #: 0.5 K/UL (ref 0.1–1.3)
ANION GAP SERPL CALCULATED.3IONS-SCNC: 13 MMOL/L (ref 9–17)
BASOPHILS # BLD: 1 % (ref 0–2)
BASOPHILS ABSOLUTE: 0.1 K/UL (ref 0–0.2)
BUN BLDV-MCNC: 24 MG/DL (ref 8–23)
BUN/CREAT BLD: ABNORMAL (ref 9–20)
CALCIUM SERPL-MCNC: 8.9 MG/DL (ref 8.6–10.4)
CHLORIDE BLD-SCNC: 93 MMOL/L (ref 98–107)
CO2: 26 MMOL/L (ref 20–31)
CREAT SERPL-MCNC: 1.48 MG/DL (ref 0.5–0.9)
DIFFERENTIAL TYPE: ABNORMAL
EOSINOPHILS RELATIVE PERCENT: 9 % (ref 0–4)
GFR AFRICAN AMERICAN: 40 ML/MIN
GFR NON-AFRICAN AMERICAN: 33 ML/MIN
GFR SERPL CREATININE-BSD FRML MDRD: ABNORMAL ML/MIN/{1.73_M2}
GFR SERPL CREATININE-BSD FRML MDRD: ABNORMAL ML/MIN/{1.73_M2}
GLUCOSE BLD-MCNC: 95 MG/DL (ref 70–99)
HCT VFR BLD CALC: 36 % (ref 36–46)
HEMOGLOBIN: 12 G/DL (ref 12–16)
IMMATURE GRANULOCYTES: ABNORMAL %
LYMPHOCYTES # BLD: 27 % (ref 24–44)
MCH RBC QN AUTO: 31.2 PG (ref 26–34)
MCHC RBC AUTO-ENTMCNC: 33.3 G/DL (ref 31–37)
MCV RBC AUTO: 93.5 FL (ref 80–100)
MONOCYTES # BLD: 11 % (ref 1–7)
NRBC AUTOMATED: ABNORMAL PER 100 WBC
PDW BLD-RTO: 14.6 % (ref 11.5–14.9)
PLATELET # BLD: 264 K/UL (ref 150–450)
PLATELET ESTIMATE: ABNORMAL
PMV BLD AUTO: 7.3 FL (ref 6–12)
POTASSIUM SERPL-SCNC: 4.2 MMOL/L (ref 3.7–5.3)
RBC # BLD: 3.85 M/UL (ref 4–5.2)
RBC # BLD: ABNORMAL 10*6/UL
SEG NEUTROPHILS: 52 % (ref 36–66)
SEGMENTED NEUTROPHILS ABSOLUTE COUNT: 2.4 K/UL (ref 1.3–9.1)
SODIUM BLD-SCNC: 132 MMOL/L (ref 135–144)
WBC # BLD: 4.7 K/UL (ref 3.5–11)
WBC # BLD: ABNORMAL 10*3/UL

## 2019-07-10 PROCEDURE — 97162 PT EVAL MOD COMPLEX 30 MIN: CPT

## 2019-07-10 PROCEDURE — 6370000000 HC RX 637 (ALT 250 FOR IP): Performed by: INTERNAL MEDICINE

## 2019-07-10 PROCEDURE — 96372 THER/PROPH/DIAG INJ SC/IM: CPT

## 2019-07-10 PROCEDURE — 97116 GAIT TRAINING THERAPY: CPT

## 2019-07-10 PROCEDURE — 2580000003 HC RX 258: Performed by: INTERNAL MEDICINE

## 2019-07-10 PROCEDURE — 6370000000 HC RX 637 (ALT 250 FOR IP): Performed by: STUDENT IN AN ORGANIZED HEALTH CARE EDUCATION/TRAINING PROGRAM

## 2019-07-10 PROCEDURE — 6360000002 HC RX W HCPCS: Performed by: INTERNAL MEDICINE

## 2019-07-10 PROCEDURE — 99225 PR SBSQ OBSERVATION CARE/DAY 25 MINUTES: CPT | Performed by: INTERNAL MEDICINE

## 2019-07-10 PROCEDURE — 2500000003 HC RX 250 WO HCPCS: Performed by: INTERNAL MEDICINE

## 2019-07-10 PROCEDURE — 97530 THERAPEUTIC ACTIVITIES: CPT

## 2019-07-10 PROCEDURE — 36415 COLL VENOUS BLD VENIPUNCTURE: CPT

## 2019-07-10 PROCEDURE — 85025 COMPLETE CBC W/AUTO DIFF WBC: CPT

## 2019-07-10 PROCEDURE — G0378 HOSPITAL OBSERVATION PER HR: HCPCS

## 2019-07-10 PROCEDURE — 96376 TX/PRO/DX INJ SAME DRUG ADON: CPT

## 2019-07-10 PROCEDURE — 80048 BASIC METABOLIC PNL TOTAL CA: CPT

## 2019-07-10 RX ORDER — BUMETANIDE 1 MG/1
3 TABLET ORAL 2 TIMES DAILY
Status: DISCONTINUED | OUTPATIENT
Start: 2019-07-10 | End: 2019-07-12 | Stop reason: HOSPADM

## 2019-07-10 RX ORDER — ACETAMINOPHEN 325 MG/1
650 TABLET ORAL EVERY 6 HOURS PRN
Status: DISCONTINUED | OUTPATIENT
Start: 2019-07-10 | End: 2019-07-12 | Stop reason: HOSPADM

## 2019-07-10 RX ORDER — METOLAZONE 2.5 MG/1
2.5 TABLET ORAL EVERY OTHER DAY
Status: DISCONTINUED | OUTPATIENT
Start: 2019-07-12 | End: 2019-07-12 | Stop reason: HOSPADM

## 2019-07-10 RX ADMIN — RANOLAZINE 500 MG: 500 TABLET, EXTENDED RELEASE ORAL at 07:59

## 2019-07-10 RX ADMIN — LEVOTHYROXINE SODIUM 100 MCG: 0.1 TABLET ORAL at 05:36

## 2019-07-10 RX ADMIN — FAMOTIDINE 20 MG: 20 TABLET, FILM COATED ORAL at 08:00

## 2019-07-10 RX ADMIN — ACETAMINOPHEN 650 MG: 325 TABLET, FILM COATED ORAL at 12:43

## 2019-07-10 RX ADMIN — PRIMIDONE 50 MG: 50 TABLET ORAL at 07:59

## 2019-07-10 RX ADMIN — ACETAMINOPHEN 650 MG: 325 TABLET, FILM COATED ORAL at 23:22

## 2019-07-10 RX ADMIN — PRIMIDONE 50 MG: 50 TABLET ORAL at 23:31

## 2019-07-10 RX ADMIN — CYANOCOBALAMIN TAB 1000 MCG 1000 MCG: 1000 TAB at 08:00

## 2019-07-10 RX ADMIN — ENOXAPARIN SODIUM 30 MG: 30 INJECTION SUBCUTANEOUS at 08:00

## 2019-07-10 RX ADMIN — PRIMIDONE 50 MG: 50 TABLET ORAL at 14:47

## 2019-07-10 RX ADMIN — DOCUSATE SODIUM 100 MG: 100 CAPSULE, LIQUID FILLED ORAL at 08:00

## 2019-07-10 RX ADMIN — Medication 10 ML: at 08:00

## 2019-07-10 RX ADMIN — BUMETANIDE 3 MG: 1 TABLET ORAL at 17:09

## 2019-07-10 RX ADMIN — Medication 10 ML: at 23:33

## 2019-07-10 RX ADMIN — ASPIRIN 81 MG: 81 TABLET ORAL at 08:00

## 2019-07-10 RX ADMIN — METOLAZONE 2.5 MG: 2.5 TABLET ORAL at 08:00

## 2019-07-10 RX ADMIN — SERTRALINE HYDROCHLORIDE 100 MG: 100 TABLET, FILM COATED ORAL at 23:30

## 2019-07-10 RX ADMIN — RANOLAZINE 500 MG: 500 TABLET, EXTENDED RELEASE ORAL at 23:31

## 2019-07-10 RX ADMIN — MICONAZOLE NITRATE: 20.6 POWDER TOPICAL at 08:05

## 2019-07-10 RX ADMIN — BUMETANIDE 1 MG: 0.25 INJECTION INTRAMUSCULAR; INTRAVENOUS at 08:01

## 2019-07-10 RX ADMIN — MICONAZOLE NITRATE: 20.6 POWDER TOPICAL at 23:33

## 2019-07-10 RX ADMIN — Medication 1 SPRAY: at 08:00

## 2019-07-10 RX ADMIN — METOPROLOL TARTRATE 12.5 MG: 25 TABLET ORAL at 23:23

## 2019-07-10 RX ADMIN — POTASSIUM CHLORIDE 20 MEQ: 20 TABLET, EXTENDED RELEASE ORAL at 08:00

## 2019-07-10 ASSESSMENT — ENCOUNTER SYMPTOMS
DIARRHEA: 0
VOMITING: 0
SHORTNESS OF BREATH: 1
STRIDOR: 0
CONSTIPATION: 0
NAUSEA: 0
WHEEZING: 0
COUGH: 0

## 2019-07-10 ASSESSMENT — PAIN SCALES - GENERAL
PAINLEVEL_OUTOF10: 6
PAINLEVEL_OUTOF10: 10
PAINLEVEL_OUTOF10: 0
PAINLEVEL_OUTOF10: 5

## 2019-07-10 ASSESSMENT — PAIN DESCRIPTION - DESCRIPTORS: DESCRIPTORS: SHARP;ACHING;DULL

## 2019-07-10 ASSESSMENT — PAIN DESCRIPTION - PAIN TYPE: TYPE: CHRONIC PAIN

## 2019-07-10 ASSESSMENT — PAIN DESCRIPTION - PROGRESSION: CLINICAL_PROGRESSION: GRADUALLY WORSENING

## 2019-07-10 ASSESSMENT — PAIN DESCRIPTION - ORIENTATION: ORIENTATION: RIGHT

## 2019-07-10 ASSESSMENT — PAIN DESCRIPTION - ONSET: ONSET: GRADUAL

## 2019-07-10 ASSESSMENT — PAIN DESCRIPTION - FREQUENCY: FREQUENCY: INTERMITTENT

## 2019-07-10 ASSESSMENT — PAIN DESCRIPTION - LOCATION: LOCATION: SHOULDER

## 2019-07-10 NOTE — PROGRESS NOTES
General appearance: alert and cooperative with exam  HEENT: Head: Normocephalic, no lesions, without obvious abnormality.   Neck: no adenopathy, no carotid bruit, no JVD, supple, symmetrical, trachea midline and thyroid not enlarged, symmetric, no tenderness/mass/nodules  Lungs: clear to auscultation bilaterally  Heart: regular rate and rhythm, S1, S2 normal, no murmur, click, rub or gallop  Abdomen: soft, non-tender; bowel sounds normal; no masses,  no organomegaly  Extremities: extremities normal, atraumatic, no cyanosis or edema  Neurologic: Mental status: Alert, oriented, thought content appropriate    EKG:   Results for orders placed or performed during the hospital encounter of 07/08/19   EKG 12 Lead   Result Value Ref Range    Ventricular Rate 74 BPM    Atrial Rate 74 BPM    P-R Interval 150 ms    QRS Duration 82 ms    Q-T Interval 422 ms    QTc Calculation (Bazett) 468 ms    P Axis 46 degrees    R Axis -4 degrees    T Axis 28 degrees    Narrative    Normal sinus rhythm  Normal ECG  When compared with ECG of 13-FEB-2019 13:28,  No significant change was found     ECHO:   Results for orders placed or performed during the hospital encounter of 02/13/19   ECHO Complete 2D W Doppler W Color   Result Value Ref Range    Left Ventricular Ejection Fraction 65     LVEF MODALITY ECHO     Narrative    [facility]    Transthoracic Echocardiography Report (TTE)     Patient Name [address]       Date of Study               02/14/2019                [facility]      Date of      09/29/1927  Gender                      Female   Birth      Age          80 year(s)  Race                              Room Number  2057        Height:                     59.06 inch, 150 cm      Corporate ID [id number]  Weight:                     176 pounds, 79.8 kg   #      Patient Acct [de-identified]   BSA:          1.75 m^2      BMI:       35.48   #                                                               kg/m^2      MR #

## 2019-07-10 NOTE — H&P
Internal Medicine         History and Physical Examination         NAME:  Severo Barker  MRN:  [id number]  Acct: [de-identified]  : 1927  PCP:Luan Beatty MD  Admit date:2019  History Obtained From:  Patient     CHIEF COMPLAINT:  \"shortness of breath and leg swelling\"    HISTORY OF PRESENT ILLNESS:    The patient is a 80 y.o. female with significant past medical history of CHF, COPD, hypertension, depression presented with worsening shortness of breath and lower extremity swelling. As per the patient her legs have been swelling up over the last few days now and she has gained several pounds of weight. She states that she has been compliant with her diuretics. Her last echo was in A. 2019, ejection fraction was 65% and RVSP of 24 mmHg. Chest x-ray on this admission was benign. BNP was 643. Creatinine was 1.6 which is at baseline she has a history of CKD. REVIEW OF SYSTEMS:  Review of Systems   Constitutional: Negative. HENT: Negative. Respiratory: Positive for shortness of breath. Negative for cough, wheezing and stridor. Cardiovascular: Positive for leg swelling. Negative for chest pain and palpitations. Gastrointestinal: Negative for constipation, diarrhea, nausea and vomiting. Endocrine: Negative. Musculoskeletal: Negative. Skin: Negative. Neurological: Negative for dizziness, syncope, light-headedness and numbness.        PAST MEDICAL HISTORY:     Past Medical History:   Diagnosis Date    B[facility] disorder ([facility] [address]     [facility]     Chronic kidney disease     COPD (chronic obstructive pulmonary disease) ([facility] [address]     Coronary artery disease involving native coronary artery of native heart with unstable angina pectoris ([facility] [address] 2017    Depression     Esophageal dysphagia     GERD (gastroesophageal reflux disease)     History of skin cancer     nose x3    HTN (hypertension) 2016    Hypothyroidism     MI (myocardial infarction)

## 2019-07-10 NOTE — PROGRESS NOTES
CMS 0-100% Score: 58.44 (07/10/19 1017)  Mobility Inpatient without Stair CMS G-Code Modifier : CK (07/10/19 1017)       Goals  Short term goals  Time Frame for Short term goals: 5 visits  Short term goal 1: Pt demonstrates IND for bed mobility. Short term goal 2: Pt demonstrates SBA for all transfers with RW. Short term goal 3: Pt to amb 76' with RW and SBA. Short term goal 4: Pt to improve B LE strength by 1/2 MMT grade.   Short term goal 5: Pt to improve standing balance to Good-  Patient Goals   Patient goals : Pt did not state       Therapy Time   Individual Concurrent Group Co-treatment   Time In 1017         Time Out 1058         Minutes 41         Timed Code Treatment Minutes: 25 Minutes       Keyur Ponce, PT

## 2019-07-10 NOTE — CONSULTS
melena, dysphagia hematemesis,constipation, abdominal bloating, flank pain  Neuro:  No CVA, TIA or seizure like activity. Skin:   No rashes, no itching. :   No hematuria, no pyuria, no dysuria, no flank pain. Extremities:  No swelling or joint pains. Objective:  CURRENT TEMPERATURE:  Temp: 97.7 °F (36.5 °C)  MAXIMUM TEMPERATURE OVER 24HRS:  Temp (24hrs), Av.8 °F (36.6 °C), Min:97.3 °F (36.3 °C), Max:98.3 °F (36.8 °C)    CURRENT RESPIRATORY RATE:  Resp: 16  CURRENT PULSE:  Pulse: 73  CURRENT BLOOD PRESSURE:  BP: (!) 115/49  24HR BLOOD PRESSURE RANGE:  Systolic (38WJG), EOR:325 , Min:111 , NNF:074   ; Diastolic (13CUC), UIH:56, Min:40, Max:50    24HR INTAKE/OUTPUT:      Intake/Output Summary (Last 24 hours) at 7/10/2019 1733  Last data filed at 7/10/2019 1221  Gross per 24 hour   Intake 945 ml   Output 600 ml   Net 345 ml     Patient Vitals for the past 96 hrs (Last 3 readings):   Weight   19 2352 187 lb 2.7 oz (84.9 kg)   19 0007 186 lb 4.6 oz (84.5 kg)   19 1346 177 lb (80.3 kg)         Physical Exam:  GENERAL APPEARANCE: Alert and cooperative, and appears to be in no acute distress. HEAD: normocephalic  EYES: PERRL, EOMI. Not pale, anicteric   NOSE:  No nasal discharge. THROAT:  Oral cavity and pharynx normal. Moist  NECK: Neck supple, non-tender without lymphadenopathy, masses or thyromegaly. JVD-neg  CARDIAC: Normal S1 and S2. No S3, S4 or murmurs. Rhythm is regular. LUNGS: Clear to auscultation and percussion without rales, rhonchi, wheezing or diminished breath sounds. ABD-Soft non distended, BS+ Non tender no organomegally  BACK: Examination of the spine reveals  no spinal deformity, without tenderness,   MUSKULOSKELETAL: Adequately aligned spine. No joint erythema or tenderness. EXTREMITIES: No edema. Peripheral pulses intact.    NEURO:Alert oriented x 3 ,power 5/5 in all extremities      Labs:   CBC:  Recent Labs     19  1435 07/10/19  0535   WBC 4.7 4.7   RBC

## 2019-07-11 LAB
ANION GAP SERPL CALCULATED.3IONS-SCNC: 14 MMOL/L (ref 9–17)
BUN BLDV-MCNC: 26 MG/DL (ref 8–23)
BUN/CREAT BLD: ABNORMAL (ref 9–20)
CALCIUM SERPL-MCNC: 8.8 MG/DL (ref 8.6–10.4)
CHLORIDE BLD-SCNC: 95 MMOL/L (ref 98–107)
CO2: 24 MMOL/L (ref 20–31)
CREAT SERPL-MCNC: 1.54 MG/DL (ref 0.5–0.9)
GFR AFRICAN AMERICAN: 38 ML/MIN
GFR NON-AFRICAN AMERICAN: 32 ML/MIN
GFR SERPL CREATININE-BSD FRML MDRD: ABNORMAL ML/MIN/{1.73_M2}
GFR SERPL CREATININE-BSD FRML MDRD: ABNORMAL ML/MIN/{1.73_M2}
GLUCOSE BLD-MCNC: 115 MG/DL (ref 70–99)
POTASSIUM SERPL-SCNC: 3.3 MMOL/L (ref 3.7–5.3)
POTASSIUM SERPL-SCNC: 3.7 MMOL/L (ref 3.7–5.3)
SODIUM BLD-SCNC: 133 MMOL/L (ref 135–144)

## 2019-07-11 PROCEDURE — 2580000003 HC RX 258: Performed by: INTERNAL MEDICINE

## 2019-07-11 PROCEDURE — G0378 HOSPITAL OBSERVATION PER HR: HCPCS

## 2019-07-11 PROCEDURE — 6360000002 HC RX W HCPCS: Performed by: INTERNAL MEDICINE

## 2019-07-11 PROCEDURE — 99232 SBSQ HOSP IP/OBS MODERATE 35: CPT | Performed by: INTERNAL MEDICINE

## 2019-07-11 PROCEDURE — 96372 THER/PROPH/DIAG INJ SC/IM: CPT

## 2019-07-11 PROCEDURE — 97166 OT EVAL MOD COMPLEX 45 MIN: CPT

## 2019-07-11 PROCEDURE — 36415 COLL VENOUS BLD VENIPUNCTURE: CPT

## 2019-07-11 PROCEDURE — 97530 THERAPEUTIC ACTIVITIES: CPT

## 2019-07-11 PROCEDURE — 97116 GAIT TRAINING THERAPY: CPT

## 2019-07-11 PROCEDURE — 80048 BASIC METABOLIC PNL TOTAL CA: CPT

## 2019-07-11 PROCEDURE — 97110 THERAPEUTIC EXERCISES: CPT

## 2019-07-11 PROCEDURE — 84132 ASSAY OF SERUM POTASSIUM: CPT

## 2019-07-11 PROCEDURE — 6370000000 HC RX 637 (ALT 250 FOR IP): Performed by: INTERNAL MEDICINE

## 2019-07-11 RX ORDER — POTASSIUM CHLORIDE 7.45 MG/ML
10 INJECTION INTRAVENOUS PRN
Status: DISCONTINUED | OUTPATIENT
Start: 2019-07-11 | End: 2019-07-12 | Stop reason: HOSPADM

## 2019-07-11 RX ORDER — POTASSIUM CHLORIDE 20 MEQ/1
40 TABLET, EXTENDED RELEASE ORAL ONCE
Status: COMPLETED | OUTPATIENT
Start: 2019-07-11 | End: 2019-07-11

## 2019-07-11 RX ORDER — POTASSIUM CHLORIDE 20 MEQ/1
40 TABLET, EXTENDED RELEASE ORAL PRN
Status: DISCONTINUED | OUTPATIENT
Start: 2019-07-11 | End: 2019-07-12 | Stop reason: HOSPADM

## 2019-07-11 RX ADMIN — POTASSIUM CHLORIDE 20 MEQ: 20 TABLET, EXTENDED RELEASE ORAL at 09:12

## 2019-07-11 RX ADMIN — RANOLAZINE 500 MG: 500 TABLET, EXTENDED RELEASE ORAL at 09:12

## 2019-07-11 RX ADMIN — BUMETANIDE 3 MG: 1 TABLET ORAL at 16:44

## 2019-07-11 RX ADMIN — MICONAZOLE NITRATE: 20.6 POWDER TOPICAL at 23:11

## 2019-07-11 RX ADMIN — ENOXAPARIN SODIUM 30 MG: 30 INJECTION SUBCUTANEOUS at 09:12

## 2019-07-11 RX ADMIN — Medication 10 ML: at 23:04

## 2019-07-11 RX ADMIN — PRIMIDONE 50 MG: 50 TABLET ORAL at 23:09

## 2019-07-11 RX ADMIN — MICONAZOLE NITRATE: 20.6 POWDER TOPICAL at 09:12

## 2019-07-11 RX ADMIN — PRIMIDONE 50 MG: 50 TABLET ORAL at 15:28

## 2019-07-11 RX ADMIN — DOCUSATE SODIUM 100 MG: 100 CAPSULE, LIQUID FILLED ORAL at 09:12

## 2019-07-11 RX ADMIN — Medication 1 SPRAY: at 09:09

## 2019-07-11 RX ADMIN — SERTRALINE HYDROCHLORIDE 100 MG: 100 TABLET, FILM COATED ORAL at 23:09

## 2019-07-11 RX ADMIN — POTASSIUM CHLORIDE 40 MEQ: 20 TABLET, EXTENDED RELEASE ORAL at 12:39

## 2019-07-11 RX ADMIN — ACETAMINOPHEN 650 MG: 325 TABLET, FILM COATED ORAL at 16:44

## 2019-07-11 RX ADMIN — CYANOCOBALAMIN TAB 1000 MCG 1000 MCG: 1000 TAB at 09:12

## 2019-07-11 RX ADMIN — ASPIRIN 81 MG: 81 TABLET ORAL at 09:12

## 2019-07-11 RX ADMIN — RANOLAZINE 500 MG: 500 TABLET, EXTENDED RELEASE ORAL at 23:09

## 2019-07-11 RX ADMIN — PRIMIDONE 50 MG: 50 TABLET ORAL at 09:12

## 2019-07-11 RX ADMIN — FAMOTIDINE 20 MG: 20 TABLET, FILM COATED ORAL at 09:12

## 2019-07-11 RX ADMIN — Medication 10 ML: at 09:12

## 2019-07-11 RX ADMIN — METOPROLOL TARTRATE 12.5 MG: 25 TABLET ORAL at 23:02

## 2019-07-11 RX ADMIN — BUMETANIDE 3 MG: 1 TABLET ORAL at 09:12

## 2019-07-11 ASSESSMENT — PAIN DESCRIPTION - LOCATION
LOCATION: SHOULDER
LOCATION: KNEE;SHOULDER;NECK

## 2019-07-11 ASSESSMENT — PAIN DESCRIPTION - FREQUENCY: FREQUENCY: INTERMITTENT

## 2019-07-11 ASSESSMENT — PAIN DESCRIPTION - PROGRESSION: CLINICAL_PROGRESSION: GRADUALLY WORSENING

## 2019-07-11 ASSESSMENT — ENCOUNTER SYMPTOMS
COUGH: 0
WHEEZING: 0
CONSTIPATION: 0
SHORTNESS OF BREATH: 1
VOMITING: 0
DIARRHEA: 0
NAUSEA: 0
STRIDOR: 0

## 2019-07-11 ASSESSMENT — PAIN SCALES - GENERAL
PAINLEVEL_OUTOF10: 6
PAINLEVEL_OUTOF10: 4

## 2019-07-11 ASSESSMENT — PAIN DESCRIPTION - DESCRIPTORS
DESCRIPTORS: ACHING
DESCRIPTORS: ACHING;SHOOTING

## 2019-07-11 ASSESSMENT — PAIN DESCRIPTION - ORIENTATION
ORIENTATION: RIGHT;LEFT
ORIENTATION: RIGHT

## 2019-07-11 ASSESSMENT — PAIN DESCRIPTION - PAIN TYPE
TYPE: CHRONIC PAIN
TYPE: CHRONIC PAIN

## 2019-07-11 ASSESSMENT — PAIN SCALES - WONG BAKER: WONGBAKER_NUMERICALRESPONSE: 6

## 2019-07-11 NOTE — H&P
Internal Medicine         History and Physical Examination         NAME:  Serge West  MRN:  [id number]  Acct: [de-identified]  : 1927  PCP:Luan Reagan MD  Admit date:2019  History Obtained From:  Patient     CHIEF COMPLAINT:  \"shortness of breath and leg swelling\"    HISTORY OF PRESENT ILLNESS:    The patient is a 80 y.o. female with significant past medical history of CHF, COPD, hypertension, depression presented with worsening shortness of breath and lower extremity swelling. As per the patient her legs have been swelling up over the last few days now and she has gained several pounds of weight. She states that she has been compliant with her diuretics. Her last echo was in A. 2019, ejection fraction was 65% and RVSP of 24 mmHg. Chest x-ray on this admission was benign. BNP was 643. Creatinine was 1.6 which is at baseline she has a history of CKD. REVIEW OF SYSTEMS:  Review of Systems   Constitutional: Negative. HENT: Negative. Respiratory: Positive for shortness of breath. Negative for cough, wheezing and stridor. Cardiovascular: Positive for leg swelling. Negative for chest pain and palpitations. Gastrointestinal: Negative for constipation, diarrhea, nausea and vomiting. Endocrine: Negative. Musculoskeletal: Negative. Skin: Negative. Neurological: Negative for dizziness, syncope, light-headedness and numbness.        PAST MEDICAL HISTORY:     Past Medical History:   Diagnosis Date    B[facility] disorder ([facility] [address]     [facility]     Chronic kidney disease     COPD (chronic obstructive pulmonary disease) ([facility] [address]     Coronary artery disease involving native coronary artery of native heart with unstable angina pectoris ([facility] [address] 2017    Depression     Esophageal dysphagia     GERD (gastroesophageal reflux disease)     History of skin cancer     nose x3    HTN (hypertension) 2016    Hypothyroidism     [facility]    Osteoarthritis     Sleep apnea     Does not use machine       PAST SURGICAL HISTORY:   Past Surgical History:   Procedure Laterality Date    BLADDER SUSPENSION      x3    CARDIAC CATHETERIZATION  4-13    severe disease small d1 and PDA    CATARACT REMOVAL Bilateral     CHOLECYSTECTOMY      HYSTERECTOMY      ovaries removed at a later date    NOSE SURGERY      cancer removed    SALPINGO-OOPHORECTOMY      UPPER GASTROINTESTINAL ENDOSCOPY N/A 9/4/2018    EGD BIOPSY performed by Aliya Harrison MD at [facility]:  Medications Prior to Admission: bumetanide (BUMEX) 1 MG tablet, Take 3 mg by mouth 2 times daily  metoprolol tartrate (LOPRESSOR) 25 MG tablet, Take 12.5 mg by mouth nightly  sertraline (ZOLOFT) 100 MG tablet, Take 100 mg by mouth nightly  famotidine (PEPCID) 20 MG tablet, Take 20 mg by mouth 2 times daily  cyanocobalamin 1000 MCG tablet, Take 1,000 mcg by mouth daily  potassium chloride (KLOR-CON M) 20 MEQ extended release tablet, Take 1 tablet by mouth daily  primidone (MYSOLINE) 50 MG tablet, Take 1 tablet by mouth 3 times daily  metolazone (ZAROXOLYN) 2.5 MG tablet, Take one tablet every other day  nitroGLYCERIN (NITROSTAT) 0.4 MG SL tablet, up to max of 3 total doses. If no relief after 1 dose, call 911. fluticasone (FLONASE) 50 MCG/ACT nasal spray, 1 spray by Each Nare route daily 1 Spray in each nostril  levothyroxine (SYNTHROID) 100 MCG tablet, Take 1 tablet by mouth daily  ranolazine (RANEXA) 500 MG extended release tablet, Take 500 mg by mouth 2 times daily  docusate sodium (COLACE, DULCOLAX) 100 MG CAPS, Take 100 mg by mouth daily  aspirin 81 MG EC tablet, Take 81 mg by mouth daily   Multiple Vitamins-Minerals (THERAPEUTIC MULTIVITAMIN-MINERALS) tablet, Take 1 tablet by mouth daily. ALLERGIES:  Dye [iodides]    SOCIAL HISTORY:  Social History     Socioeconomic History    Marital status:       Spouse name: None    Number of children:

## 2019-07-11 NOTE — PROGRESS NOTES
Physical Therapy  [address] [facility]    Physical Therapy Progress Note    Date: 19  Patient Name: Nicole Morales       Room:   MRN: [id number]   Account: [de-identified]   : 1927  (80 y.o.)   Gender: female     Discharge Recommendations   Subacute/Skilled Nursing Facility, Continue to assess pending progress, 24 hour supervision or assist  Equipment Needed: No  Other: Has 4WW. Referring Practitioner: Dr Sharon Laura   Diagnosis: CHF ;  hypokalemia   Restrictions/Precautions: General Precautions, Fall Risk  Implants present? : (pt denies)  Other position/activity restrictions: up with assistance   Past Medical History:  has a past medical history of Bipolar disorder ([facility] [address] CHF (congestive heart failure) ([facility] [address] Chronic kidney disease, COPD (chronic obstructive pulmonary disease) ([facility] [address] Coronary artery disease involving native coronary artery of native heart with unstable angina pectoris ([facility] [address] Depression, Esophageal dysphagia, GERD (gastroesophageal reflux disease), History of skin cancer, HTN (hypertension), Hypothyroidism, MI (myocardial infarction) ([facility] [address] Osteoarthritis, and Sleep apnea. Past Surgical History:   has a past surgical history that includes Cholecystectomy; bladder suspension; Nose surgery; Cataract removal (Bilateral); Hysterectomy; Salpingo-oophorectomy; Cardiac catheterization (-); and Upper gastrointestinal endoscopy (N/A, 2018). 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 : (pt denies)  Position Activity Restriction  Other position/activity restrictions: up with assistance    Subjective: Pt agreeable to PT at this time, c/o pain in B knees, shoulder, neck. Comments: ANGELA CASTILLO'moreno aaron, informed of pt need for new external cath, Tylenol.      Vital 2: Seated B LE ther ex, 10x each, AROM/orange (min) resistance band. Other exercises 3: Bed mobility    Assessment  Activity Tolerance: Patient limited by endurance; Patient limited by pain   Body structures, Functions, Activity limitations: Decreased functional mobility ; Decreased strength;Decreased safe awareness;Decreased endurance;Decreased balance; Increased Pain  Specific instructions for Next Treatment: ambulation, strength, balance/fall prevention  Discharge Recommendations: Subacute/Skilled Nursing Facility;Continue to assess pending progress;24 hour supervision or assist     Type of devices: Call light within reach;Gait belt;Patient at risk for falls; Left in bed;Nurse notified; All fall risk precautions in place     Plan  Times per week: 5-7x/week  Current Treatment Recommendations: Strengthening, Balance Training, Functional Mobility Training, Transfer Training, Endurance Training, Gait Training, Home Exercise Program, Safety Education & Training, Patient/Caregiver Education & Training, Equipment Evaluation, Education, & procurement, Positioning, Neuromuscular Re-education    Patient Education  New Education Provided: Gait training  Raenelle Render and patient  Method: demonstration and explanation       Outcome: verbalized concerns and needs reinforcement     Goals  Short term goals  Time Frame for Short term goals: 5 visits  Short term goal 1: Pt demonstrates IND for bed mobility. Short term goal 2: Pt demonstrates SBA for all transfers with RW. Short term goal 3: Pt to amb 76' with RW and SBA. Short term goal 4: Pt to improve B LE strength by 1/2 MMT grade.   Short term goal 5: Pt to improve standing balance to Good-    PT Individual Minutes  Time In: 8035  Time Out: 1630  Minutes: 55    Electronically signed by Michael Mendoza PTA on 7/11/19 at 5:09 PM

## 2019-07-11 NOTE — PROGRESS NOTES
Orientation: Right  Pain Descriptors: Aching  Pain Frequency: Intermittent    Objective      Cognition  Overall Cognitive Status: WFL  Cognition Comment: Reports her memory is starting to fade         ADL  Feeding: Increased time to complete, Setup(Container management  )  Grooming: Minimal assistance(impaied right shoulder function )  UE Bathing: Minimal assistance  LE Bathing: Moderate assistance  UE Dressing: Minimal assistance  LE Dressing: Moderate assistance  Toileting: Minimal assistance, Contact guard assistance    UE Function  Hand Dominance  Hand Dominance: Right        LUE Strength  Gross LUE Strength: WFL  L Hand General: 4/5  Left Hand Strength -  (lbs)  Handle Setting 2: 34 (Norms 31-50# )   LUE Tone: Normotonic     LUE AROM (degrees)  LUE AROM : WFL     Left Hand AROM (degrees)  Left Hand AROM: WFL  RUE Strength  Gross RUE Strength: WFL  R Hand General: 4/5   Right Hand Strength -  (lbs)  Handle Setting 2: 28# (norms 31-50# )   RUE Tone: Normotonic     RUE AROM (degrees)  RUE AROM : Exceptions  RUE General AROM: Impaired shoulder function      Right Hand AROM (degrees)  Right Hand AROM: WFL    Fine Motor Skills  Coordination  Coordination and Movement description: Gross motor impairments, Right UE                      Other Assessment: 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     Mobility  Sit to Supine: Minimal assistance       Balance  Sitting Balance: Modified independent         Bed mobility  Rolling to Left: Contact guard assistance  Sit to Supine: Minimal assistance            Assessment  Performance deficits / Impairments: Decreased functional mobility , Decreased ADL status, Decreased strength, Decreased safe awareness, Decreased coordination, Decreased endurance  Treatment Diagnosis: Altered Tolerance to performing care tasks  Prognosis: Good  Decision Making: Medium Complexity  History:  Moderate chart review   Exam: 6 areas of altered performance   Patient

## 2019-07-11 NOTE — CONSULTS
NEPHROLOGY CONSULT       Reason for Consult: Renal insufficiency      Chief Complaint: Shortness of breath and leg swelling  History Obtained From:  Patient in E HR records  History of Present Illness: This is a 80 y.o. female significant past medical history of CHF With diastolic dysfunction EF 33% on last echo in 2019 with mild pulmonary hypertension. , COPD, hypertension, who  presented with worsening shortness of breath and lower extremity swelling. As per the patient her legs have been swelling up over the last few days now and she has gained several pounds of weight. She states that she has been compliant with her diuretics. He has CKD stage III with baseline creatinine of 1.4 to 1.6 mg/dL secondary to nephrosclerosis. He has been on a high-dose of diuretics Bumex 3 mg twice daily at home and Metolazone 2.5 mg every other day. She follows with [facility], her nephrologist.States she has been compliant with medications.   She has serum sodium of 132 today with creatinine improved at 1.48 mg/dL. Chest x-ray showed no acute cardiopulmonary disease and venous duplex ultrasound of lower extremities were negative. Interval history  Patient's shortness of breath improving gradually. Denies chest pain leg swelling is improving. Potassium is 3.3.     Current Medications:      potassium chloride (KLOR-CON M) extended release tablet 40 mEq PRN   Or    potassium bicarb-citric acid (EFFER-K) effervescent tablet 40 mEq PRN   Or    potassium chloride 10 mEq/100 mL IVPB (Peripheral Line) PRN   potassium chloride 10 mEq/100 mL IVPB (Peripheral Line) PRN   [START ON 7/12/2019] metolazone (ZAROXOLYN) tablet 2.5 mg Every Other Day   bumetanide (BUMEX) tablet 3 mg BID   acetaminophen (TYLENOL) tablet 650 mg Q6H PRN   miconazole (MICOTIN) 2 % powder BID   nitroGLYCERIN (NITROSTAT) SL tablet 0.4 mg Q5 Min PRN   sodium chloride flush 0.9 % injection 10 mL 2 times per day   sodium chloride flush 0.9 % injection 10 mL PRN magnesium hydroxide (MILK OF MAGNESIA) 400 MG/5ML suspension 30 mL Daily PRN   ondansetron (ZOFRAN) injection 4 mg Q6H PRN   enoxaparin (LOVENOX) injection 30 mg Daily   aspirin EC tablet 81 mg Daily   vitamin B-12 (CYANOCOBALAMIN) tablet 1,000 mcg Daily   docusate sodium (COLACE) capsule 100 mg Daily   famotidine (PEPCID) tablet 20 mg Daily   fluticasone (FLONASE) 50 MCG/ACT nasal spray 1 spray Daily   levothyroxine (SYNTHROID) tablet 100 mcg Daily   potassium chloride (KLOR-CON M) extended release tablet 20 mEq Daily   primidone (MYSOLINE) tablet 50 mg TID   ranolazine (RANEXA) extended release tablet 500 mg BID   sertraline (ZOLOFT) tablet 100 mg Nightly   metoprolol tartrate (LOPRESSOR) tablet 12.5 mg Nightly       Allergies:  Dye [iodides]      Objective:  CURRENT TEMPERATURE:  Temp: 98.1 °F (36.7 °C)  MAXIMUM TEMPERATURE OVER 24HRS:  Temp (24hrs), Av.9 °F (36.6 °C), Min:97.5 °F (36.4 °C), Max:98.1 °F (36.7 °C)    CURRENT RESPIRATORY RATE:  Resp: 18  CURRENT PULSE:  Pulse: 86  CURRENT BLOOD PRESSURE:  BP: (!) 126/40  24HR BLOOD PRESSURE RANGE:  Systolic (72CUE), XDM:711 , Min:116 , BDN:397   ; Diastolic (28ZXN), KYP:63, Min:40, Max:68    24HR INTAKE/OUTPUT:      Intake/Output Summary (Last 24 hours) at 2019 1621  Last data filed at 2019 1153  Gross per 24 hour   Intake 485 ml   Output 1200 ml   Net -715 ml     Patient Vitals for the past 96 hrs (Last 3 readings):   Weight   19 0444 178 lb 12.7 oz (81.1 kg)   19 2352 187 lb 2.7 oz (84.9 kg)   19 0007 186 lb 4.6 oz (84.5 kg)         Physical Exam:  GENERAL APPEARANCE: Alert and cooperative, and appears to be in no acute distress. HEAD: normocephalic  EYES: PERRL, EOMI. Not pale, anicteric   NOSE:  No nasal discharge. THROAT:  Oral cavity and pharynx normal. Moist  NECK: Neck supple, non-tender without lymphadenopathy, masses or thyromegaly. JVD-neg  CARDIAC: Normal S1 and S2. No S3, S4 or murmurs. Rhythm is regular.   LUNGS: Clear to auscultation and percussion without rales, rhonchi, wheezing or diminished breath sounds. ABD-Soft non distended, BS+ Non tender no organomegally  BACK: Examination of the spine reveals  no spinal deformity, without tenderness,   MUSKULOSKELETAL: Adequately aligned spine. No joint erythema or tenderness. EXTREMITIES: No edema. Peripheral pulses intact. NEURO:Alert oriented x 3 ,power 5/5 in all extremities      Labs:   CBC:  Recent Labs     07/10/19  0535   WBC 4.7   RBC 3.85*   HGB 12.0   HCT 36.0   MCV 93.5   MCH 31.2   MCHC 33.3   RDW 14.6      MPV 7.3      BMP:   Recent Labs     07/09/19  1018 07/10/19  0535 07/11/19  1005 07/11/19  1154    132* 133*  --    K 3.8 4.2 3.3* 3.7   CL 98 93* 95*  --    CO2 26 26 24  --    BUN 26* 24* 26*  --    CREATININE 1.61* 1.48* 1.54*  --    GLUCOSE 127* 95 115*  --    CALCIUM 8.8 8.9 8.8  --         Phosphorus:  No results for input(s): PHOS in the last 72 hours. Magnesium: No results for input(s): MG in the last 72 hours. Albumin: No results for input(s): LABALBU in the last 72 hours. IRON:  No results found for: IRON  Iron Saturation:  No components found for: PERCENTFE  TIBC:  No results found for: TIBC  FERRITIN:  No results found for: FERRITIN  SPEP:   Lab Results   Component Value Date    PROT 6.5 03/01/2019    PATH NOT REPORTED 09/12/2017     UPEP: No results found for: TPU     C3:   Lab Results   Component Value Date    C3 125 02/11/2014     C4:   Lab Results   Component Value Date    C4 25 02/11/2014     MPO ANCA:  No results found for: MPO .   PR3 ANCA:  No results found for: PR3  Urine Sodium:  No results found for: SUZAN   Urine Potassium:  No results found for: KUR  Urine Chloride:  No components found for: CLU  Urine Ph:  No components found for: PO4U  Urine Osmolarity:  No results found for: OSMOU  Urine Creatinine:    Lab Results   Component Value Date    LABCREA 25.3 04/04/2019     Urine Eosinophils: No components found for:

## 2019-07-12 VITALS
HEIGHT: 59 IN | OXYGEN SATURATION: 95 % | HEART RATE: 90 BPM | SYSTOLIC BLOOD PRESSURE: 128 MMHG | WEIGHT: 175.49 LBS | BODY MASS INDEX: 35.38 KG/M2 | DIASTOLIC BLOOD PRESSURE: 80 MMHG | RESPIRATION RATE: 19 BRPM | TEMPERATURE: 98 F

## 2019-07-12 PROCEDURE — 99239 HOSP IP/OBS DSCHRG MGMT >30: CPT | Performed by: INTERNAL MEDICINE

## 2019-07-12 PROCEDURE — 6360000002 HC RX W HCPCS: Performed by: INTERNAL MEDICINE

## 2019-07-12 PROCEDURE — 6370000000 HC RX 637 (ALT 250 FOR IP): Performed by: INTERNAL MEDICINE

## 2019-07-12 PROCEDURE — G0378 HOSPITAL OBSERVATION PER HR: HCPCS

## 2019-07-12 PROCEDURE — 96372 THER/PROPH/DIAG INJ SC/IM: CPT

## 2019-07-12 PROCEDURE — 2580000003 HC RX 258: Performed by: INTERNAL MEDICINE

## 2019-07-12 RX ADMIN — CYANOCOBALAMIN TAB 1000 MCG 1000 MCG: 1000 TAB at 09:03

## 2019-07-12 RX ADMIN — MICONAZOLE NITRATE: 20.6 POWDER TOPICAL at 09:04

## 2019-07-12 RX ADMIN — Medication 10 ML: at 09:05

## 2019-07-12 RX ADMIN — ENOXAPARIN SODIUM 30 MG: 30 INJECTION SUBCUTANEOUS at 09:03

## 2019-07-12 RX ADMIN — PRIMIDONE 50 MG: 50 TABLET ORAL at 13:17

## 2019-07-12 RX ADMIN — LEVOTHYROXINE SODIUM 100 MCG: 0.1 TABLET ORAL at 06:17

## 2019-07-12 RX ADMIN — BUMETANIDE 3 MG: 1 TABLET ORAL at 09:03

## 2019-07-12 RX ADMIN — DOCUSATE SODIUM 100 MG: 100 CAPSULE, LIQUID FILLED ORAL at 09:04

## 2019-07-12 RX ADMIN — ASPIRIN 81 MG: 81 TABLET ORAL at 09:04

## 2019-07-12 RX ADMIN — PRIMIDONE 50 MG: 50 TABLET ORAL at 09:04

## 2019-07-12 RX ADMIN — POTASSIUM CHLORIDE 20 MEQ: 20 TABLET, EXTENDED RELEASE ORAL at 09:04

## 2019-07-12 RX ADMIN — Medication 1 SPRAY: at 09:04

## 2019-07-12 RX ADMIN — FAMOTIDINE 20 MG: 20 TABLET, FILM COATED ORAL at 09:04

## 2019-07-12 RX ADMIN — RANOLAZINE 500 MG: 500 TABLET, EXTENDED RELEASE ORAL at 09:04

## 2019-07-12 RX ADMIN — METOLAZONE 2.5 MG: 2.5 TABLET ORAL at 09:04

## 2019-07-12 NOTE — CARE COORDINATION
DISCHARGE PLANNING NOTE:    Plan is for this patient to go to [facility]. Insurance denied a skilled stay, so LSW is waiting to hear if patient is able to admit under the Medicaid portion of her insurance - SHould have answer today. [place] [facility] - 23%     Will continue to follow along.      Electronically signed by Quinton Mercado RN on 7/12/2019 at 8:42 AM

## 2019-07-12 NOTE — DISCHARGE SUMMARY
Disposition:   ECF    Discharged Condition:  Stable     Discharge Medications:       [facility]   Home Medication Instructions KHR:[id number]    Printed on:07/12/19 1102   Medication Information                      aspirin 81 MG EC tablet  Take 81 mg by mouth daily              bumetanide (BUMEX) 1 MG tablet  Take 3 mg by mouth 2 times daily             cyanocobalamin 1000 MCG tablet  Take 1,000 mcg by mouth daily             docusate sodium (COLACE, DULCOLAX) 100 MG CAPS  Take 100 mg by mouth daily             famotidine (PEPCID) 20 MG tablet  Take 20 mg by mouth 2 times daily             fluticasone (FLONASE) 50 MCG/ACT nasal spray  1 spray by Each Nare route daily 1 Spray in each nostril             levothyroxine (SYNTHROID) 100 MCG tablet  Take 1 tablet by mouth daily             metolazone (ZAROXOLYN) 2.5 MG tablet  Take one tablet every other day             metoprolol tartrate (LOPRESSOR) 25 MG tablet  Take 12.5 mg by mouth nightly             miconazole (MICOTIN) 2 % powder  Apply topically 2 times daily. Multiple Vitamins-Minerals (THERAPEUTIC MULTIVITAMIN-MINERALS) tablet  Take 1 tablet by mouth daily. nitroGLYCERIN (NITROSTAT) 0.4 MG SL tablet  up to max of 3 total doses. If no relief after 1 dose, call 911. potassium chloride (KLOR-CON M) 20 MEQ extended release tablet  Take 1 tablet by mouth daily             primidone (MYSOLINE) 50 MG tablet  Take 1 tablet by mouth 3 times daily             ranolazine (RANEXA) 500 MG extended release tablet  Take 500 mg by mouth 2 times daily             sertraline (ZOLOFT) 100 MG tablet  Take 100 mg by mouth nightly                 Discharge Instructions: Follow up with Chai Aparicio MD in 2 weeks.       Hospital acquired Infections: None    Time spent for dc more than 30 min   Tatiana VARGHESE attending

## 2019-07-12 NOTE — PLAN OF CARE
No pain at this time. 0/10 pain scale.   Alternative pain interventions applied per patient request.

## 2019-08-29 ENCOUNTER — OFFICE VISIT (OUTPATIENT)
Dept: INTERNAL MEDICINE CLINIC | Age: 84
End: 2019-08-29
Payer: COMMERCIAL

## 2019-08-29 ENCOUNTER — HOSPITAL ENCOUNTER (OUTPATIENT)
Age: 84
Setting detail: SPECIMEN
Discharge: HOME | End: 2019-08-29
Payer: COMMERCIAL

## 2019-08-29 VITALS
BODY MASS INDEX: 38.19 KG/M2 | OXYGEN SATURATION: 95 % | DIASTOLIC BLOOD PRESSURE: 58 MMHG | HEART RATE: 82 BPM | WEIGHT: 177 LBS | HEIGHT: 57 IN | SYSTOLIC BLOOD PRESSURE: 108 MMHG

## 2019-08-29 DIAGNOSIS — I25.10 CORONARY ARTERY DISEASE INVOLVING NATIVE HEART WITHOUT ANGINA PECTORIS, UNSPECIFIED VESSEL OR LESION TYPE: ICD-10-CM

## 2019-08-29 DIAGNOSIS — R53.1 GENERALIZED WEAKNESS: ICD-10-CM

## 2019-08-29 DIAGNOSIS — I50.32 CHRONIC DIASTOLIC CONGESTIVE HEART FAILURE (HCC): ICD-10-CM

## 2019-08-29 DIAGNOSIS — Z09 HOSPITAL DISCHARGE FOLLOW-UP: Primary | ICD-10-CM

## 2019-08-29 DIAGNOSIS — N18.4 CKD (CHRONIC KIDNEY DISEASE) STAGE 4, GFR 15-29 ML/MIN (HCC): Chronic | ICD-10-CM

## 2019-08-29 LAB
ABSOLUTE EOS #: 0.49 K/UL (ref 0–0.44)
ABSOLUTE IMMATURE GRANULOCYTE: 0.04 K/UL (ref 0–0.3)
ABSOLUTE LYMPH #: 1.37 K/UL (ref 1.1–3.7)
ABSOLUTE MONO #: 0.71 K/UL (ref 0.1–1.2)
ANION GAP SERPL CALCULATED.3IONS-SCNC: 17 MMOL/L (ref 9–17)
BASOPHILS # BLD: 1 % (ref 0–2)
BASOPHILS ABSOLUTE: 0.06 K/UL (ref 0–0.2)
BUN BLDV-MCNC: 26 MG/DL (ref 8–23)
BUN/CREAT BLD: ABNORMAL (ref 9–20)
CALCIUM SERPL-MCNC: 9.4 MG/DL (ref 8.6–10.4)
CHLORIDE BLD-SCNC: 92 MMOL/L (ref 98–107)
CO2: 29 MMOL/L (ref 20–31)
CREAT SERPL-MCNC: 1.38 MG/DL (ref 0.5–0.9)
DIFFERENTIAL TYPE: ABNORMAL
EOSINOPHILS RELATIVE PERCENT: 8 % (ref 1–4)
GFR AFRICAN AMERICAN: 43 ML/MIN
GFR NON-AFRICAN AMERICAN: 36 ML/MIN
GFR SERPL CREATININE-BSD FRML MDRD: ABNORMAL ML/MIN/{1.73_M2}
GFR SERPL CREATININE-BSD FRML MDRD: ABNORMAL ML/MIN/{1.73_M2}
GLUCOSE BLD-MCNC: 102 MG/DL (ref 70–99)
HCT VFR BLD CALC: 35.9 % (ref 36.3–47.1)
HEMOGLOBIN: 11.2 G/DL (ref 11.9–15.1)
IMMATURE GRANULOCYTES: 1 %
LYMPHOCYTES # BLD: 23 % (ref 24–43)
MCH RBC QN AUTO: 30.5 PG (ref 25.2–33.5)
MCHC RBC AUTO-ENTMCNC: 31.2 G/DL (ref 28.4–34.8)
MCV RBC AUTO: 97.8 FL (ref 82.6–102.9)
MONOCYTES # BLD: 12 % (ref 3–12)
NRBC AUTOMATED: 0 PER 100 WBC
PDW BLD-RTO: 15.6 % (ref 11.8–14.4)
PLATELET # BLD: 338 K/UL (ref 138–453)
PLATELET ESTIMATE: ABNORMAL
PMV BLD AUTO: 9.7 FL (ref 8.1–13.5)
POTASSIUM SERPL-SCNC: 4.8 MMOL/L (ref 3.7–5.3)
RBC # BLD: 3.67 M/UL (ref 3.95–5.11)
RBC # BLD: ABNORMAL 10*6/UL
SEG NEUTROPHILS: 55 % (ref 36–65)
SEGMENTED NEUTROPHILS ABSOLUTE COUNT: 3.38 K/UL (ref 1.5–8.1)
SODIUM BLD-SCNC: 138 MMOL/L (ref 135–144)
VITAMIN D 25-HYDROXY: 52.2 NG/ML (ref 30–100)
WBC # BLD: 6.1 K/UL (ref 3.5–11.3)
WBC # BLD: ABNORMAL 10*3/UL

## 2019-08-29 PROCEDURE — 99214 OFFICE O/P EST MOD 30 MIN: CPT | Performed by: NURSE PRACTITIONER

## 2019-08-29 PROCEDURE — 1111F DSCHRG MED/CURRENT MED MERGE: CPT | Performed by: NURSE PRACTITIONER

## 2019-08-29 RX ORDER — LORATADINE 10 MG/1
10 TABLET ORAL DAILY
Refills: 5 | COMMUNITY
Start: 2019-07-17 | End: 2020-03-13 | Stop reason: SDUPTHER

## 2019-08-30 ASSESSMENT — ENCOUNTER SYMPTOMS
COUGH: 1
CONSTIPATION: 0
EYE REDNESS: 0
WHEEZING: 0
EYE PAIN: 0
BACK PAIN: 1
SHORTNESS OF BREATH: 1
TROUBLE SWALLOWING: 0
ABDOMINAL PAIN: 0

## 2019-10-07 ENCOUNTER — HOSPITAL ENCOUNTER (OUTPATIENT)
Age: 84
Setting detail: SPECIMEN
Discharge: HOME | End: 2019-10-07
Payer: COMMERCIAL

## 2019-10-08 LAB
ANION GAP SERPL CALCULATED.3IONS-SCNC: 16 MMOL/L (ref 9–17)
BUN BLDV-MCNC: 29 MG/DL (ref 8–23)
BUN/CREAT BLD: ABNORMAL (ref 9–20)
CALCIUM SERPL-MCNC: 9.2 MG/DL (ref 8.6–10.4)
CHLORIDE BLD-SCNC: 93 MMOL/L (ref 98–107)
CO2: 28 MMOL/L (ref 20–31)
CREAT SERPL-MCNC: 1.75 MG/DL (ref 0.5–0.9)
GFR AFRICAN AMERICAN: 33 ML/MIN
GFR NON-AFRICAN AMERICAN: 27 ML/MIN
GFR SERPL CREATININE-BSD FRML MDRD: ABNORMAL ML/MIN/{1.73_M2}
GFR SERPL CREATININE-BSD FRML MDRD: ABNORMAL ML/MIN/{1.73_M2}
GLUCOSE BLD-MCNC: 102 MG/DL (ref 70–99)
POTASSIUM SERPL-SCNC: 3.5 MMOL/L (ref 3.7–5.3)
SODIUM BLD-SCNC: 137 MMOL/L (ref 135–144)

## 2019-10-09 ENCOUNTER — OFFICE VISIT (OUTPATIENT)
Dept: INTERNAL MEDICINE CLINIC | Age: 84
End: 2019-10-09
Payer: COMMERCIAL

## 2019-10-09 VITALS
WEIGHT: 177 LBS | BODY MASS INDEX: 38.19 KG/M2 | HEIGHT: 57 IN | SYSTOLIC BLOOD PRESSURE: 130 MMHG | DIASTOLIC BLOOD PRESSURE: 64 MMHG

## 2019-10-09 DIAGNOSIS — I25.10 CORONARY ARTERY DISEASE INVOLVING NATIVE HEART WITHOUT ANGINA PECTORIS, UNSPECIFIED VESSEL OR LESION TYPE: ICD-10-CM

## 2019-10-09 DIAGNOSIS — I50.32 CHRONIC DIASTOLIC CONGESTIVE HEART FAILURE (HCC): ICD-10-CM

## 2019-10-09 DIAGNOSIS — R41.89 COGNITIVE IMPAIRMENT: ICD-10-CM

## 2019-10-09 DIAGNOSIS — F03.90 DEMENTIA WITHOUT BEHAVIORAL DISTURBANCE, UNSPECIFIED DEMENTIA TYPE: ICD-10-CM

## 2019-10-09 DIAGNOSIS — R13.10 DYSPHAGIA, UNSPECIFIED TYPE: ICD-10-CM

## 2019-10-09 DIAGNOSIS — Z09 HOSPITAL DISCHARGE FOLLOW-UP: Primary | ICD-10-CM

## 2019-10-09 DIAGNOSIS — N18.4 CKD (CHRONIC KIDNEY DISEASE) STAGE 4, GFR 15-29 ML/MIN (HCC): ICD-10-CM

## 2019-10-09 DIAGNOSIS — F32.A DEPRESSION, UNSPECIFIED DEPRESSION TYPE: ICD-10-CM

## 2019-10-09 PROCEDURE — 99214 OFFICE O/P EST MOD 30 MIN: CPT | Performed by: INTERNAL MEDICINE

## 2019-10-09 ASSESSMENT — ENCOUNTER SYMPTOMS
RESPIRATORY NEGATIVE: 1
ALLERGIC/IMMUNOLOGIC NEGATIVE: 1
EYES NEGATIVE: 1
GASTROINTESTINAL NEGATIVE: 1

## 2019-10-10 DIAGNOSIS — K21.00 GASTROESOPHAGEAL REFLUX DISEASE WITH ESOPHAGITIS: ICD-10-CM

## 2019-10-10 RX ORDER — FAMOTIDINE 20 MG/1
20 TABLET, FILM COATED ORAL 2 TIMES DAILY
Qty: 60 TABLET | Refills: 8 | Status: SHIPPED | OUTPATIENT
Start: 2019-10-10

## 2019-10-15 ENCOUNTER — HOSPITAL ENCOUNTER (OUTPATIENT)
Age: 84
Setting detail: SPECIMEN
Discharge: HOME | End: 2019-10-15
Payer: COMMERCIAL

## 2019-10-15 ENCOUNTER — OFFICE VISIT (OUTPATIENT)
Dept: INTERNAL MEDICINE CLINIC | Age: 84
End: 2019-10-15
Payer: COMMERCIAL

## 2019-10-15 VITALS
HEIGHT: 57 IN | OXYGEN SATURATION: 96 % | BODY MASS INDEX: 38.83 KG/M2 | SYSTOLIC BLOOD PRESSURE: 110 MMHG | DIASTOLIC BLOOD PRESSURE: 62 MMHG | HEART RATE: 77 BPM | WEIGHT: 180 LBS

## 2019-10-15 DIAGNOSIS — Z09 HOSPITAL DISCHARGE FOLLOW-UP: ICD-10-CM

## 2019-10-15 DIAGNOSIS — Z00.00 ROUTINE GENERAL MEDICAL EXAMINATION AT A HEALTH CARE FACILITY: ICD-10-CM

## 2019-10-15 DIAGNOSIS — Z23 NEED FOR INFLUENZA VACCINATION: Primary | ICD-10-CM

## 2019-10-15 DIAGNOSIS — N18.4 CKD (CHRONIC KIDNEY DISEASE) STAGE 4, GFR 15-29 ML/MIN (HCC): ICD-10-CM

## 2019-10-15 PROCEDURE — 90653 IIV ADJUVANT VACCINE IM: CPT | Performed by: NURSE PRACTITIONER

## 2019-10-15 PROCEDURE — G0008 ADMIN INFLUENZA VIRUS VAC: HCPCS | Performed by: NURSE PRACTITIONER

## 2019-10-15 PROCEDURE — G0438 PPPS, INITIAL VISIT: HCPCS | Performed by: NURSE PRACTITIONER

## 2019-10-15 ASSESSMENT — LIFESTYLE VARIABLES
AUDIT-C TOTAL SCORE: 2
HOW OFTEN DURING THE LAST YEAR HAVE YOU BEEN UNABLE TO REMEMBER WHAT HAPPENED THE NIGHT BEFORE BECAUSE YOU HAD BEEN DRINKING: 0
HOW OFTEN DURING THE LAST YEAR HAVE YOU HAD A FEELING OF GUILT OR REMORSE AFTER DRINKING: 0
HOW OFTEN DO YOU HAVE SIX OR MORE DRINKS ON ONE OCCASION: 0
HOW OFTEN DO YOU HAVE A DRINK CONTAINING ALCOHOL: 2
HOW OFTEN DURING THE LAST YEAR HAVE YOU FAILED TO DO WHAT WAS NORMALLY EXPECTED FROM YOU BECAUSE OF DRINKING: 0
AUDIT TOTAL SCORE: 2
HOW OFTEN DURING THE LAST YEAR HAVE YOU NEEDED AN ALCOHOLIC DRINK FIRST THING IN THE MORNING TO GET YOURSELF GOING AFTER A NIGHT OF HEAVY DRINKING: 0
HAVE YOU OR SOMEONE ELSE BEEN INJURED AS A RESULT OF YOUR DRINKING: 0
HOW OFTEN DURING THE LAST YEAR HAVE YOU FOUND THAT YOU WERE NOT ABLE TO STOP DRINKING ONCE YOU HAD STARTED: 0
HAS A RELATIVE, FRIEND, DOCTOR, OR ANOTHER HEALTH PROFESSIONAL EXPRESSED CONCERN ABOUT YOUR DRINKING OR SUGGESTED YOU CUT DOWN: 0
HOW MANY STANDARD DRINKS CONTAINING ALCOHOL DO YOU HAVE ON A TYPICAL DAY: 0

## 2019-10-15 ASSESSMENT — PATIENT HEALTH QUESTIONNAIRE - PHQ9: SUM OF ALL RESPONSES TO PHQ QUESTIONS 1-9: 9

## 2019-10-16 LAB
ANION GAP SERPL CALCULATED.3IONS-SCNC: 22 MMOL/L (ref 9–17)
BNP INTERPRETATION: ABNORMAL
BUN BLDV-MCNC: 29 MG/DL (ref 8–23)
BUN/CREAT BLD: ABNORMAL (ref 9–20)
CALCIUM SERPL-MCNC: 9.6 MG/DL (ref 8.6–10.4)
CHLORIDE BLD-SCNC: 96 MMOL/L (ref 98–107)
CO2: 22 MMOL/L (ref 20–31)
CREAT SERPL-MCNC: 1.35 MG/DL (ref 0.5–0.9)
GFR AFRICAN AMERICAN: 44 ML/MIN
GFR NON-AFRICAN AMERICAN: 37 ML/MIN
GFR SERPL CREATININE-BSD FRML MDRD: ABNORMAL ML/MIN/{1.73_M2}
GFR SERPL CREATININE-BSD FRML MDRD: ABNORMAL ML/MIN/{1.73_M2}
GLUCOSE BLD-MCNC: 107 MG/DL (ref 70–99)
POTASSIUM SERPL-SCNC: 4.3 MMOL/L (ref 3.7–5.3)
PRO-BNP: 432 PG/ML
SODIUM BLD-SCNC: 140 MMOL/L (ref 135–144)
TSH SERPL DL<=0.05 MIU/L-ACNC: 3.98 MIU/L (ref 0.3–5)

## 2019-10-29 ENCOUNTER — TELEPHONE (OUTPATIENT)
Dept: INTERNAL MEDICINE CLINIC | Age: 84
End: 2019-10-29

## 2019-11-11 DIAGNOSIS — G25.0 ESSENTIAL TREMOR: ICD-10-CM

## 2019-11-13 RX ORDER — PRIMIDONE 50 MG/1
50 TABLET ORAL 3 TIMES DAILY
Qty: 90 TABLET | Refills: 2 | Status: SHIPPED | OUTPATIENT
Start: 2019-11-13 | End: 2019-12-08 | Stop reason: SDUPTHER

## 2019-11-18 ENCOUNTER — HOSPITAL ENCOUNTER (OUTPATIENT)
Age: 84
Setting detail: OBSERVATION
Discharge: NURSING, REHAB OR LONG-TERM CARE | End: 2019-11-22
Attending: EMERGENCY MEDICINE | Admitting: INTERNAL MEDICINE
Payer: COMMERCIAL

## 2019-11-18 ENCOUNTER — APPOINTMENT (OUTPATIENT)
Dept: GENERAL RADIOLOGY | Age: 84
End: 2019-11-18
Payer: COMMERCIAL

## 2019-11-18 DIAGNOSIS — E87.1 ACUTE HYPONATREMIA: Primary | ICD-10-CM

## 2019-11-18 LAB
ABSOLUTE EOS #: 0.3 K/UL (ref 0–0.4)
ABSOLUTE IMMATURE GRANULOCYTE: ABNORMAL K/UL (ref 0–0.3)
ABSOLUTE LYMPH #: 1.2 K/UL (ref 1–4.8)
ABSOLUTE MONO #: 0.6 K/UL (ref 0.1–1.3)
ALBUMIN SERPL-MCNC: 4 G/DL (ref 3.5–5.2)
ALBUMIN/GLOBULIN RATIO: ABNORMAL (ref 1–2.5)
ALP BLD-CCNC: 83 U/L (ref 35–104)
ALT SERPL-CCNC: 17 U/L (ref 5–33)
ANION GAP SERPL CALCULATED.3IONS-SCNC: 13 MMOL/L (ref 9–17)
AST SERPL-CCNC: 22 U/L
BASOPHILS # BLD: 1 % (ref 0–2)
BASOPHILS ABSOLUTE: 0.1 K/UL (ref 0–0.2)
BILIRUB SERPL-MCNC: <0.15 MG/DL (ref 0.3–1.2)
BNP INTERPRETATION: ABNORMAL
BUN BLDV-MCNC: 36 MG/DL (ref 8–23)
BUN/CREAT BLD: ABNORMAL (ref 9–20)
CALCIUM SERPL-MCNC: 9.1 MG/DL (ref 8.6–10.4)
CHLORIDE BLD-SCNC: 83 MMOL/L (ref 98–107)
CO2: 31 MMOL/L (ref 20–31)
CREAT SERPL-MCNC: 1.49 MG/DL (ref 0.5–0.9)
D-DIMER QUANTITATIVE: 0.47 MG/L FEU (ref 0–0.59)
DIFFERENTIAL TYPE: ABNORMAL
DIRECT EXAM: NORMAL
EOSINOPHILS RELATIVE PERCENT: 6 % (ref 0–4)
GFR AFRICAN AMERICAN: 40 ML/MIN
GFR NON-AFRICAN AMERICAN: 33 ML/MIN
GFR SERPL CREATININE-BSD FRML MDRD: ABNORMAL ML/MIN/{1.73_M2}
GFR SERPL CREATININE-BSD FRML MDRD: ABNORMAL ML/MIN/{1.73_M2}
GLUCOSE BLD-MCNC: 102 MG/DL (ref 70–99)
HCT VFR BLD CALC: 34.9 % (ref 36–46)
HEMOGLOBIN: 11.9 G/DL (ref 12–16)
IMMATURE GRANULOCYTES: ABNORMAL %
LYMPHOCYTES # BLD: 23 % (ref 24–44)
Lab: NORMAL
MAGNESIUM: 2.6 MG/DL (ref 1.6–2.6)
MCH RBC QN AUTO: 32.5 PG (ref 26–34)
MCHC RBC AUTO-ENTMCNC: 34.2 G/DL (ref 31–37)
MCV RBC AUTO: 95.1 FL (ref 80–100)
MONOCYTES # BLD: 11 % (ref 1–7)
NRBC AUTOMATED: ABNORMAL PER 100 WBC
PDW BLD-RTO: 13.8 % (ref 11.5–14.9)
PLATELET # BLD: 301 K/UL (ref 150–450)
PLATELET ESTIMATE: ABNORMAL
PMV BLD AUTO: 7.2 FL (ref 6–12)
POTASSIUM SERPL-SCNC: 3.3 MMOL/L (ref 3.7–5.3)
PRO-BNP: 415 PG/ML
RBC # BLD: 3.67 M/UL (ref 4–5.2)
RBC # BLD: ABNORMAL 10*6/UL
SEG NEUTROPHILS: 59 % (ref 36–66)
SEGMENTED NEUTROPHILS ABSOLUTE COUNT: 3.1 K/UL (ref 1.3–9.1)
SODIUM BLD-SCNC: 127 MMOL/L (ref 135–144)
SPECIMEN DESCRIPTION: NORMAL
TOTAL PROTEIN: 6.8 G/DL (ref 6.4–8.3)
TROPONIN INTERP: ABNORMAL
TROPONIN T: ABNORMAL NG/ML
TROPONIN, HIGH SENSITIVITY: 26 NG/L (ref 0–14)
TSH SERPL DL<=0.05 MIU/L-ACNC: 2.33 MIU/L (ref 0.3–5)
WBC # BLD: 5.2 K/UL (ref 3.5–11)
WBC # BLD: ABNORMAL 10*3/UL

## 2019-11-18 PROCEDURE — 83735 ASSAY OF MAGNESIUM: CPT

## 2019-11-18 PROCEDURE — 93005 ELECTROCARDIOGRAM TRACING: CPT | Performed by: STUDENT IN AN ORGANIZED HEALTH CARE EDUCATION/TRAINING PROGRAM

## 2019-11-18 PROCEDURE — 87804 INFLUENZA ASSAY W/OPTIC: CPT

## 2019-11-18 PROCEDURE — 85025 COMPLETE CBC W/AUTO DIFF WBC: CPT

## 2019-11-18 PROCEDURE — G0378 HOSPITAL OBSERVATION PER HR: HCPCS

## 2019-11-18 PROCEDURE — 71046 X-RAY EXAM CHEST 2 VIEWS: CPT

## 2019-11-18 PROCEDURE — 96365 THER/PROPH/DIAG IV INF INIT: CPT

## 2019-11-18 PROCEDURE — 80053 COMPREHEN METABOLIC PANEL: CPT

## 2019-11-18 PROCEDURE — 2580000003 HC RX 258: Performed by: STUDENT IN AN ORGANIZED HEALTH CARE EDUCATION/TRAINING PROGRAM

## 2019-11-18 PROCEDURE — 6360000002 HC RX W HCPCS: Performed by: STUDENT IN AN ORGANIZED HEALTH CARE EDUCATION/TRAINING PROGRAM

## 2019-11-18 PROCEDURE — 83880 ASSAY OF NATRIURETIC PEPTIDE: CPT

## 2019-11-18 PROCEDURE — 99285 EMERGENCY DEPT VISIT HI MDM: CPT

## 2019-11-18 PROCEDURE — 96367 TX/PROPH/DG ADDL SEQ IV INF: CPT

## 2019-11-18 PROCEDURE — 36415 COLL VENOUS BLD VENIPUNCTURE: CPT

## 2019-11-18 PROCEDURE — 84443 ASSAY THYROID STIM HORMONE: CPT

## 2019-11-18 PROCEDURE — 85379 FIBRIN DEGRADATION QUANT: CPT

## 2019-11-18 PROCEDURE — 84484 ASSAY OF TROPONIN QUANT: CPT

## 2019-11-18 RX ORDER — MAGNESIUM SULFATE 1 G/100ML
1 INJECTION INTRAVENOUS
Status: DISPENSED | OUTPATIENT
Start: 2019-11-18 | End: 2019-11-18

## 2019-11-18 RX ADMIN — POTASSIUM CHLORIDE: 2 INJECTION, SOLUTION, CONCENTRATE INTRAVENOUS at 22:45

## 2019-11-18 RX ADMIN — MAGNESIUM SULFATE HEPTAHYDRATE 1 G: 1 INJECTION, SOLUTION INTRAVENOUS at 21:41

## 2019-11-19 PROBLEM — J06.9 URI (UPPER RESPIRATORY INFECTION): Status: ACTIVE | Noted: 2019-11-19

## 2019-11-19 PROBLEM — G31.84 MCI (MILD COGNITIVE IMPAIRMENT): Status: ACTIVE | Noted: 2019-11-19

## 2019-11-19 LAB
ABSOLUTE EOS #: 0.4 K/UL (ref 0–0.4)
ABSOLUTE IMMATURE GRANULOCYTE: ABNORMAL K/UL (ref 0–0.3)
ABSOLUTE LYMPH #: 1.2 K/UL (ref 1–4.8)
ABSOLUTE MONO #: 0.7 K/UL (ref 0.1–1.3)
ANION GAP SERPL CALCULATED.3IONS-SCNC: 12 MMOL/L (ref 9–17)
ANION GAP SERPL CALCULATED.3IONS-SCNC: 15 MMOL/L (ref 9–17)
BASOPHILS # BLD: 1 % (ref 0–2)
BASOPHILS ABSOLUTE: 0.1 K/UL (ref 0–0.2)
BUN BLDV-MCNC: 30 MG/DL (ref 8–23)
BUN BLDV-MCNC: 30 MG/DL (ref 8–23)
BUN/CREAT BLD: ABNORMAL (ref 9–20)
BUN/CREAT BLD: ABNORMAL (ref 9–20)
CALCIUM SERPL-MCNC: 8.7 MG/DL (ref 8.6–10.4)
CALCIUM SERPL-MCNC: 8.8 MG/DL (ref 8.6–10.4)
CHLORIDE BLD-SCNC: 90 MMOL/L (ref 98–107)
CHLORIDE BLD-SCNC: 92 MMOL/L (ref 98–107)
CO2: 29 MMOL/L (ref 20–31)
CO2: 29 MMOL/L (ref 20–31)
CREAT SERPL-MCNC: 1.29 MG/DL (ref 0.5–0.9)
CREAT SERPL-MCNC: 1.36 MG/DL (ref 0.5–0.9)
DIFFERENTIAL TYPE: ABNORMAL
EKG ATRIAL RATE: 74 BPM
EKG P AXIS: 39 DEGREES
EKG P-R INTERVAL: 164 MS
EKG Q-T INTERVAL: 464 MS
EKG QRS DURATION: 92 MS
EKG QTC CALCULATION (BAZETT): 515 MS
EKG R AXIS: -2 DEGREES
EKG T AXIS: 33 DEGREES
EKG VENTRICULAR RATE: 74 BPM
EOSINOPHILS RELATIVE PERCENT: 6 % (ref 0–4)
GFR AFRICAN AMERICAN: 44 ML/MIN
GFR AFRICAN AMERICAN: 47 ML/MIN
GFR NON-AFRICAN AMERICAN: 36 ML/MIN
GFR NON-AFRICAN AMERICAN: 39 ML/MIN
GFR SERPL CREATININE-BSD FRML MDRD: ABNORMAL ML/MIN/{1.73_M2}
GLUCOSE BLD-MCNC: 105 MG/DL (ref 70–99)
GLUCOSE BLD-MCNC: 99 MG/DL (ref 70–99)
HCT VFR BLD CALC: 33 % (ref 36–46)
HCT VFR BLD CALC: 33.8 % (ref 36–46)
HEMOGLOBIN: 11.3 G/DL (ref 12–16)
HEMOGLOBIN: 11.4 G/DL (ref 12–16)
IMMATURE GRANULOCYTES: ABNORMAL %
INR BLD: 1
LYMPHOCYTES # BLD: 20 % (ref 24–44)
MAGNESIUM: 2.8 MG/DL (ref 1.6–2.6)
MCH RBC QN AUTO: 32 PG (ref 26–34)
MCH RBC QN AUTO: 32.7 PG (ref 26–34)
MCHC RBC AUTO-ENTMCNC: 33.8 G/DL (ref 31–37)
MCHC RBC AUTO-ENTMCNC: 34.3 G/DL (ref 31–37)
MCV RBC AUTO: 94.7 FL (ref 80–100)
MCV RBC AUTO: 95.3 FL (ref 80–100)
MONOCYTES # BLD: 12 % (ref 1–7)
NRBC AUTOMATED: ABNORMAL PER 100 WBC
NRBC AUTOMATED: ABNORMAL PER 100 WBC
OSMOLALITY URINE: 223 MOSM/KG (ref 80–1300)
PDW BLD-RTO: 13.5 % (ref 11.5–14.9)
PDW BLD-RTO: 13.7 % (ref 11.5–14.9)
PLATELET # BLD: 258 K/UL (ref 150–450)
PLATELET # BLD: 291 K/UL (ref 150–450)
PLATELET ESTIMATE: ABNORMAL
PMV BLD AUTO: 7.5 FL (ref 6–12)
PMV BLD AUTO: 7.6 FL (ref 6–12)
POTASSIUM SERPL-SCNC: 3.2 MMOL/L (ref 3.7–5.3)
POTASSIUM SERPL-SCNC: 3.7 MMOL/L (ref 3.7–5.3)
PROTHROMBIN TIME: 13.5 SEC (ref 11.8–14.6)
RBC # BLD: 3.46 M/UL (ref 4–5.2)
RBC # BLD: 3.57 M/UL (ref 4–5.2)
RBC # BLD: ABNORMAL 10*6/UL
SEG NEUTROPHILS: 61 % (ref 36–66)
SEGMENTED NEUTROPHILS ABSOLUTE COUNT: 3.5 K/UL (ref 1.3–9.1)
SERUM OSMOLALITY: 280 MOSM/KG (ref 275–295)
SODIUM BLD-SCNC: 133 MMOL/L (ref 135–144)
SODIUM BLD-SCNC: 134 MMOL/L (ref 135–144)
SODIUM,UR: 58 MMOL/L
WBC # BLD: 4.7 K/UL (ref 3.5–11)
WBC # BLD: 5.7 K/UL (ref 3.5–11)
WBC # BLD: ABNORMAL 10*3/UL

## 2019-11-19 PROCEDURE — 96372 THER/PROPH/DIAG INJ SC/IM: CPT

## 2019-11-19 PROCEDURE — 97530 THERAPEUTIC ACTIVITIES: CPT

## 2019-11-19 PROCEDURE — 97535 SELF CARE MNGMENT TRAINING: CPT

## 2019-11-19 PROCEDURE — 84300 ASSAY OF URINE SODIUM: CPT

## 2019-11-19 PROCEDURE — 93010 ELECTROCARDIOGRAM REPORT: CPT | Performed by: INTERNAL MEDICINE

## 2019-11-19 PROCEDURE — 97162 PT EVAL MOD COMPLEX 30 MIN: CPT

## 2019-11-19 PROCEDURE — 6360000002 HC RX W HCPCS: Performed by: NURSE PRACTITIONER

## 2019-11-19 PROCEDURE — 85027 COMPLETE CBC AUTOMATED: CPT

## 2019-11-19 PROCEDURE — 83930 ASSAY OF BLOOD OSMOLALITY: CPT

## 2019-11-19 PROCEDURE — 96375 TX/PRO/DX INJ NEW DRUG ADDON: CPT

## 2019-11-19 PROCEDURE — 97166 OT EVAL MOD COMPLEX 45 MIN: CPT

## 2019-11-19 PROCEDURE — 85610 PROTHROMBIN TIME: CPT

## 2019-11-19 PROCEDURE — 85025 COMPLETE CBC W/AUTO DIFF WBC: CPT

## 2019-11-19 PROCEDURE — 99220 PR INITIAL OBSERVATION CARE/DAY 70 MINUTES: CPT | Performed by: INTERNAL MEDICINE

## 2019-11-19 PROCEDURE — 6370000000 HC RX 637 (ALT 250 FOR IP): Performed by: NURSE PRACTITIONER

## 2019-11-19 PROCEDURE — G0378 HOSPITAL OBSERVATION PER HR: HCPCS

## 2019-11-19 PROCEDURE — 36415 COLL VENOUS BLD VENIPUNCTURE: CPT

## 2019-11-19 PROCEDURE — 6360000002 HC RX W HCPCS: Performed by: EMERGENCY MEDICINE

## 2019-11-19 PROCEDURE — 97116 GAIT TRAINING THERAPY: CPT

## 2019-11-19 PROCEDURE — 83735 ASSAY OF MAGNESIUM: CPT

## 2019-11-19 PROCEDURE — 6370000000 HC RX 637 (ALT 250 FOR IP): Performed by: INTERNAL MEDICINE

## 2019-11-19 PROCEDURE — 96366 THER/PROPH/DIAG IV INF ADDON: CPT

## 2019-11-19 PROCEDURE — 2500000003 HC RX 250 WO HCPCS: Performed by: NURSE PRACTITIONER

## 2019-11-19 PROCEDURE — 83935 ASSAY OF URINE OSMOLALITY: CPT

## 2019-11-19 PROCEDURE — 80048 BASIC METABOLIC PNL TOTAL CA: CPT

## 2019-11-19 PROCEDURE — 2580000003 HC RX 258: Performed by: NURSE PRACTITIONER

## 2019-11-19 RX ORDER — METOLAZONE 2.5 MG/1
2.5 TABLET ORAL EVERY OTHER DAY
Status: DISCONTINUED | OUTPATIENT
Start: 2019-11-20 | End: 2019-11-19

## 2019-11-19 RX ORDER — SERTRALINE HYDROCHLORIDE 100 MG/1
100 TABLET, FILM COATED ORAL NIGHTLY
Status: DISCONTINUED | OUTPATIENT
Start: 2019-11-19 | End: 2019-11-22 | Stop reason: HOSPADM

## 2019-11-19 RX ORDER — BUPROPION HYDROCHLORIDE 100 MG/1
100 TABLET, EXTENDED RELEASE ORAL 2 TIMES DAILY
Status: DISCONTINUED | OUTPATIENT
Start: 2019-11-19 | End: 2019-11-22 | Stop reason: HOSPADM

## 2019-11-19 RX ORDER — LANOLIN ALCOHOL/MO/W.PET/CERES
1000 CREAM (GRAM) TOPICAL DAILY
Status: DISCONTINUED | OUTPATIENT
Start: 2019-11-19 | End: 2019-11-19 | Stop reason: CLARIF

## 2019-11-19 RX ORDER — BUMETANIDE 1 MG/1
3 TABLET ORAL 2 TIMES DAILY
Status: DISCONTINUED | OUTPATIENT
Start: 2019-11-19 | End: 2019-11-19

## 2019-11-19 RX ORDER — RANOLAZINE 500 MG/1
500 TABLET, EXTENDED RELEASE ORAL 2 TIMES DAILY
Status: DISCONTINUED | OUTPATIENT
Start: 2019-11-19 | End: 2019-11-22 | Stop reason: HOSPADM

## 2019-11-19 RX ORDER — POTASSIUM CHLORIDE 7.45 MG/ML
10 INJECTION INTRAVENOUS PRN
Status: DISCONTINUED | OUTPATIENT
Start: 2019-11-19 | End: 2019-11-22 | Stop reason: HOSPADM

## 2019-11-19 RX ORDER — ACETAMINOPHEN 325 MG/1
650 TABLET ORAL EVERY 4 HOURS PRN
Status: DISCONTINUED | OUTPATIENT
Start: 2019-11-19 | End: 2019-11-22 | Stop reason: HOSPADM

## 2019-11-19 RX ORDER — BUMETANIDE 1 MG/1
2 TABLET ORAL 2 TIMES DAILY
Status: DISCONTINUED | OUTPATIENT
Start: 2019-11-19 | End: 2019-11-20

## 2019-11-19 RX ORDER — UBIDECARENONE 75 MG
100 CAPSULE ORAL DAILY
Status: DISCONTINUED | OUTPATIENT
Start: 2019-11-19 | End: 2019-11-22 | Stop reason: HOSPADM

## 2019-11-19 RX ORDER — MAGNESIUM SULFATE 1 G/100ML
1 INJECTION INTRAVENOUS PRN
Status: DISCONTINUED | OUTPATIENT
Start: 2019-11-19 | End: 2019-11-22 | Stop reason: HOSPADM

## 2019-11-19 RX ORDER — NICOTINE 21 MG/24HR
1 PATCH, TRANSDERMAL 24 HOURS TRANSDERMAL DAILY PRN
Status: DISCONTINUED | OUTPATIENT
Start: 2019-11-19 | End: 2019-11-22 | Stop reason: HOSPADM

## 2019-11-19 RX ORDER — ONDANSETRON 2 MG/ML
4 INJECTION INTRAMUSCULAR; INTRAVENOUS EVERY 6 HOURS PRN
Status: DISCONTINUED | OUTPATIENT
Start: 2019-11-19 | End: 2019-11-22 | Stop reason: HOSPADM

## 2019-11-19 RX ORDER — ASPIRIN 81 MG/1
81 TABLET ORAL DAILY
Status: DISCONTINUED | OUTPATIENT
Start: 2019-11-19 | End: 2019-11-22 | Stop reason: HOSPADM

## 2019-11-19 RX ORDER — DOCUSATE SODIUM 100 MG/1
100 CAPSULE, LIQUID FILLED ORAL DAILY
Status: DISCONTINUED | OUTPATIENT
Start: 2019-11-19 | End: 2019-11-22 | Stop reason: HOSPADM

## 2019-11-19 RX ORDER — FLUTICASONE PROPIONATE 50 MCG
1 SPRAY, SUSPENSION (ML) NASAL DAILY
Status: DISCONTINUED | OUTPATIENT
Start: 2019-11-19 | End: 2019-11-22 | Stop reason: HOSPADM

## 2019-11-19 RX ORDER — LORATADINE 10 MG/1
10 TABLET ORAL DAILY
Status: DISCONTINUED | OUTPATIENT
Start: 2019-11-19 | End: 2019-11-22 | Stop reason: HOSPADM

## 2019-11-19 RX ORDER — POTASSIUM CHLORIDE 20 MEQ/1
20 TABLET, EXTENDED RELEASE ORAL DAILY
Status: DISCONTINUED | OUTPATIENT
Start: 2019-11-19 | End: 2019-11-22 | Stop reason: HOSPADM

## 2019-11-19 RX ORDER — METOLAZONE 2.5 MG/1
2.5 TABLET ORAL
Status: DISCONTINUED | OUTPATIENT
Start: 2019-11-19 | End: 2019-11-22 | Stop reason: HOSPADM

## 2019-11-19 RX ORDER — PRIMIDONE 50 MG/1
50 TABLET ORAL 3 TIMES DAILY
Status: DISCONTINUED | OUTPATIENT
Start: 2019-11-19 | End: 2019-11-22 | Stop reason: HOSPADM

## 2019-11-19 RX ORDER — SODIUM CHLORIDE 0.9 % (FLUSH) 0.9 %
10 SYRINGE (ML) INJECTION PRN
Status: DISCONTINUED | OUTPATIENT
Start: 2019-11-19 | End: 2019-11-22 | Stop reason: HOSPADM

## 2019-11-19 RX ORDER — LEVOTHYROXINE SODIUM 0.1 MG/1
100 TABLET ORAL DAILY
Status: DISCONTINUED | OUTPATIENT
Start: 2019-11-19 | End: 2019-11-22 | Stop reason: HOSPADM

## 2019-11-19 RX ORDER — POTASSIUM CHLORIDE 20 MEQ/1
40 TABLET, EXTENDED RELEASE ORAL PRN
Status: DISCONTINUED | OUTPATIENT
Start: 2019-11-19 | End: 2019-11-22 | Stop reason: HOSPADM

## 2019-11-19 RX ORDER — SODIUM CHLORIDE 0.9 % (FLUSH) 0.9 %
10 SYRINGE (ML) INJECTION EVERY 12 HOURS SCHEDULED
Status: DISCONTINUED | OUTPATIENT
Start: 2019-11-19 | End: 2019-11-22 | Stop reason: HOSPADM

## 2019-11-19 RX ORDER — M-VIT,TX,IRON,MINS/CALC/FOLIC 27MG-0.4MG
1 TABLET ORAL DAILY
Status: DISCONTINUED | OUTPATIENT
Start: 2019-11-19 | End: 2019-11-22 | Stop reason: HOSPADM

## 2019-11-19 RX ORDER — NITROGLYCERIN 0.4 MG/1
0.4 TABLET SUBLINGUAL EVERY 5 MIN PRN
Status: DISCONTINUED | OUTPATIENT
Start: 2019-11-19 | End: 2019-11-22 | Stop reason: HOSPADM

## 2019-11-19 RX ORDER — FAMOTIDINE 20 MG/1
20 TABLET, FILM COATED ORAL DAILY
Status: DISCONTINUED | OUTPATIENT
Start: 2019-11-19 | End: 2019-11-22 | Stop reason: HOSPADM

## 2019-11-19 RX ORDER — MORPHINE SULFATE 2 MG/ML
2 INJECTION, SOLUTION INTRAMUSCULAR; INTRAVENOUS ONCE
Status: COMPLETED | OUTPATIENT
Start: 2019-11-19 | End: 2019-11-19

## 2019-11-19 RX ADMIN — METOLAZONE 2.5 MG: 2.5 TABLET ORAL at 13:01

## 2019-11-19 RX ADMIN — BUMETANIDE 3 MG: 1 TABLET ORAL at 07:58

## 2019-11-19 RX ADMIN — Medication 100 MCG: at 08:00

## 2019-11-19 RX ADMIN — BUPROPION HYDROCHLORIDE 100 MG: 100 TABLET, FILM COATED, EXTENDED RELEASE ORAL at 19:48

## 2019-11-19 RX ADMIN — BUMETANIDE 2 MG: 1 TABLET ORAL at 19:53

## 2019-11-19 RX ADMIN — PRIMIDONE 50 MG: 50 TABLET ORAL at 19:50

## 2019-11-19 RX ADMIN — ASPIRIN 81 MG: 81 TABLET ORAL at 08:00

## 2019-11-19 RX ADMIN — ENOXAPARIN SODIUM 40 MG: 40 INJECTION SUBCUTANEOUS at 07:58

## 2019-11-19 RX ADMIN — PRIMIDONE 50 MG: 50 TABLET ORAL at 08:03

## 2019-11-19 RX ADMIN — FAMOTIDINE 20 MG: 20 TABLET, FILM COATED ORAL at 08:04

## 2019-11-19 RX ADMIN — DOCUSATE SODIUM 100 MG: 100 CAPSULE, LIQUID FILLED ORAL at 08:02

## 2019-11-19 RX ADMIN — ANTI-FUNGAL POWDER MICONAZOLE NITRATE TALC FREE: 1.42 POWDER TOPICAL at 08:04

## 2019-11-19 RX ADMIN — PRIMIDONE 50 MG: 50 TABLET ORAL at 14:08

## 2019-11-19 RX ADMIN — MULTIPLE VITAMINS W/ MINERALS TAB 1 TABLET: TAB at 07:58

## 2019-11-19 RX ADMIN — Medication 10 ML: at 08:06

## 2019-11-19 RX ADMIN — BUPROPION HYDROCHLORIDE 100 MG: 100 TABLET, FILM COATED, EXTENDED RELEASE ORAL at 13:00

## 2019-11-19 RX ADMIN — Medication 12.5 MG: at 19:49

## 2019-11-19 RX ADMIN — RANOLAZINE 500 MG: 500 TABLET, EXTENDED RELEASE ORAL at 08:03

## 2019-11-19 RX ADMIN — Medication 10 ML: at 20:17

## 2019-11-19 RX ADMIN — LORATADINE 10 MG: 10 TABLET ORAL at 08:00

## 2019-11-19 RX ADMIN — LEVOTHYROXINE SODIUM 100 MCG: 0.1 TABLET ORAL at 06:39

## 2019-11-19 RX ADMIN — POTASSIUM BICARBONATE 40 MEQ: 782 TABLET, EFFERVESCENT ORAL at 06:39

## 2019-11-19 RX ADMIN — POTASSIUM CHLORIDE 20 MEQ: 20 TABLET, EXTENDED RELEASE ORAL at 08:01

## 2019-11-19 RX ADMIN — SERTRALINE HYDROCHLORIDE 100 MG: 100 TABLET, FILM COATED ORAL at 19:50

## 2019-11-19 RX ADMIN — RANOLAZINE 500 MG: 500 TABLET, EXTENDED RELEASE ORAL at 19:47

## 2019-11-19 RX ADMIN — MORPHINE SULFATE 2 MG: 2 INJECTION, SOLUTION INTRAMUSCULAR; INTRAVENOUS at 01:44

## 2019-11-19 ASSESSMENT — ENCOUNTER SYMPTOMS
ABDOMINAL PAIN: 1
DIARRHEA: 0
VOMITING: 0
CONSTIPATION: 0
RHINORRHEA: 1
SHORTNESS OF BREATH: 1
NAUSEA: 0
WHEEZING: 0
BACK PAIN: 0
COUGH: 1
SORE THROAT: 0

## 2019-11-19 ASSESSMENT — PAIN DESCRIPTION - PAIN TYPE
TYPE: ACUTE PAIN
TYPE: ACUTE PAIN

## 2019-11-19 ASSESSMENT — PAIN DESCRIPTION - LOCATION
LOCATION: CHEST
LOCATION: CHEST

## 2019-11-19 ASSESSMENT — PAIN SCALES - GENERAL
PAINLEVEL_OUTOF10: 2
PAINLEVEL_OUTOF10: 2
PAINLEVEL_OUTOF10: 7

## 2019-11-19 ASSESSMENT — PAIN DESCRIPTION - PROGRESSION: CLINICAL_PROGRESSION: NOT CHANGED

## 2019-11-19 ASSESSMENT — PAIN DESCRIPTION - ONSET: ONSET: ON-GOING

## 2019-11-19 ASSESSMENT — PAIN DESCRIPTION - FREQUENCY
FREQUENCY: INTERMITTENT
FREQUENCY: INTERMITTENT

## 2019-11-20 LAB
ANION GAP SERPL CALCULATED.3IONS-SCNC: 12 MMOL/L (ref 9–17)
BUN BLDV-MCNC: 27 MG/DL (ref 8–23)
BUN/CREAT BLD: ABNORMAL (ref 9–20)
CALCIUM SERPL-MCNC: 8.8 MG/DL (ref 8.6–10.4)
CHLORIDE BLD-SCNC: 89 MMOL/L (ref 98–107)
CO2: 30 MMOL/L (ref 20–31)
CREAT SERPL-MCNC: 1.26 MG/DL (ref 0.5–0.9)
GFR AFRICAN AMERICAN: 48 ML/MIN
GFR NON-AFRICAN AMERICAN: 40 ML/MIN
GFR SERPL CREATININE-BSD FRML MDRD: ABNORMAL ML/MIN/{1.73_M2}
GFR SERPL CREATININE-BSD FRML MDRD: ABNORMAL ML/MIN/{1.73_M2}
GLUCOSE BLD-MCNC: 95 MG/DL (ref 70–99)
HCT VFR BLD CALC: 33.2 % (ref 36–46)
HEMOGLOBIN: 11.4 G/DL (ref 12–16)
MAGNESIUM: 2.5 MG/DL (ref 1.6–2.6)
MCH RBC QN AUTO: 32.7 PG (ref 26–34)
MCHC RBC AUTO-ENTMCNC: 34.4 G/DL (ref 31–37)
MCV RBC AUTO: 95 FL (ref 80–100)
NRBC AUTOMATED: ABNORMAL PER 100 WBC
PDW BLD-RTO: 13.9 % (ref 11.5–14.9)
PLATELET # BLD: 259 K/UL (ref 150–450)
PMV BLD AUTO: 7.4 FL (ref 6–12)
POTASSIUM SERPL-SCNC: 3.4 MMOL/L (ref 3.7–5.3)
RBC # BLD: 3.49 M/UL (ref 4–5.2)
SODIUM BLD-SCNC: 131 MMOL/L (ref 135–144)
WBC # BLD: 4.1 K/UL (ref 3.5–11)

## 2019-11-20 PROCEDURE — 36415 COLL VENOUS BLD VENIPUNCTURE: CPT

## 2019-11-20 PROCEDURE — 6370000000 HC RX 637 (ALT 250 FOR IP): Performed by: INTERNAL MEDICINE

## 2019-11-20 PROCEDURE — 83735 ASSAY OF MAGNESIUM: CPT

## 2019-11-20 PROCEDURE — G0378 HOSPITAL OBSERVATION PER HR: HCPCS

## 2019-11-20 PROCEDURE — 97116 GAIT TRAINING THERAPY: CPT

## 2019-11-20 PROCEDURE — 6370000000 HC RX 637 (ALT 250 FOR IP): Performed by: NURSE PRACTITIONER

## 2019-11-20 PROCEDURE — 80048 BASIC METABOLIC PNL TOTAL CA: CPT

## 2019-11-20 PROCEDURE — 2580000003 HC RX 258: Performed by: NURSE PRACTITIONER

## 2019-11-20 PROCEDURE — 99226 PR SBSQ OBSERVATION CARE/DAY 35 MINUTES: CPT | Performed by: INTERNAL MEDICINE

## 2019-11-20 PROCEDURE — 85027 COMPLETE CBC AUTOMATED: CPT

## 2019-11-20 PROCEDURE — 97530 THERAPEUTIC ACTIVITIES: CPT

## 2019-11-20 PROCEDURE — 96372 THER/PROPH/DIAG INJ SC/IM: CPT

## 2019-11-20 PROCEDURE — 97110 THERAPEUTIC EXERCISES: CPT

## 2019-11-20 PROCEDURE — 6360000002 HC RX W HCPCS: Performed by: NURSE PRACTITIONER

## 2019-11-20 RX ORDER — BUMETANIDE 1 MG/1
1 TABLET ORAL 2 TIMES DAILY
Status: DISCONTINUED | OUTPATIENT
Start: 2019-11-20 | End: 2019-11-22 | Stop reason: HOSPADM

## 2019-11-20 RX ORDER — SPIRONOLACTONE 25 MG/1
25 TABLET ORAL DAILY
Status: DISCONTINUED | OUTPATIENT
Start: 2019-11-20 | End: 2019-11-22 | Stop reason: HOSPADM

## 2019-11-20 RX ADMIN — DOCUSATE SODIUM 100 MG: 100 CAPSULE, LIQUID FILLED ORAL at 08:56

## 2019-11-20 RX ADMIN — ENOXAPARIN SODIUM 40 MG: 40 INJECTION SUBCUTANEOUS at 09:00

## 2019-11-20 RX ADMIN — MULTIPLE VITAMINS W/ MINERALS TAB 1 TABLET: TAB at 08:46

## 2019-11-20 RX ADMIN — Medication 10 ML: at 08:58

## 2019-11-20 RX ADMIN — FAMOTIDINE 20 MG: 20 TABLET, FILM COATED ORAL at 08:56

## 2019-11-20 RX ADMIN — RANOLAZINE 500 MG: 500 TABLET, EXTENDED RELEASE ORAL at 20:35

## 2019-11-20 RX ADMIN — SERTRALINE HYDROCHLORIDE 100 MG: 100 TABLET, FILM COATED ORAL at 20:38

## 2019-11-20 RX ADMIN — BUMETANIDE 2 MG: 1 TABLET ORAL at 08:46

## 2019-11-20 RX ADMIN — ACETAMINOPHEN 650 MG: 325 TABLET, FILM COATED ORAL at 10:25

## 2019-11-20 RX ADMIN — ANTI-FUNGAL POWDER MICONAZOLE NITRATE TALC FREE: 1.42 POWDER TOPICAL at 20:39

## 2019-11-20 RX ADMIN — RANOLAZINE 500 MG: 500 TABLET, EXTENDED RELEASE ORAL at 08:53

## 2019-11-20 RX ADMIN — LORATADINE 10 MG: 10 TABLET ORAL at 08:54

## 2019-11-20 RX ADMIN — POTASSIUM CHLORIDE 40 MEQ: 20 TABLET, EXTENDED RELEASE ORAL at 08:50

## 2019-11-20 RX ADMIN — PRIMIDONE 50 MG: 50 TABLET ORAL at 08:52

## 2019-11-20 RX ADMIN — ASPIRIN 81 MG: 81 TABLET ORAL at 08:57

## 2019-11-20 RX ADMIN — PRIMIDONE 50 MG: 50 TABLET ORAL at 20:38

## 2019-11-20 RX ADMIN — PRIMIDONE 50 MG: 50 TABLET ORAL at 14:54

## 2019-11-20 RX ADMIN — ANTI-FUNGAL POWDER MICONAZOLE NITRATE TALC FREE: 1.42 POWDER TOPICAL at 08:56

## 2019-11-20 RX ADMIN — SPIRONOLACTONE 25 MG: 25 TABLET, FILM COATED ORAL at 14:53

## 2019-11-20 RX ADMIN — Medication 100 MCG: at 08:54

## 2019-11-20 RX ADMIN — BUPROPION HYDROCHLORIDE 100 MG: 100 TABLET, FILM COATED, EXTENDED RELEASE ORAL at 20:38

## 2019-11-20 RX ADMIN — LEVOTHYROXINE SODIUM 100 MCG: 0.1 TABLET ORAL at 05:00

## 2019-11-20 RX ADMIN — Medication 10 ML: at 20:22

## 2019-11-20 RX ADMIN — BUPROPION HYDROCHLORIDE 100 MG: 100 TABLET, FILM COATED, EXTENDED RELEASE ORAL at 08:57

## 2019-11-20 RX ADMIN — Medication 12.5 MG: at 20:32

## 2019-11-20 RX ADMIN — BUMETANIDE 1 MG: 1 TABLET ORAL at 20:32

## 2019-11-20 ASSESSMENT — PAIN DESCRIPTION - ORIENTATION: ORIENTATION: UPPER;LEFT

## 2019-11-20 ASSESSMENT — PAIN SCALES - GENERAL
PAINLEVEL_OUTOF10: 8
PAINLEVEL_OUTOF10: 0
PAINLEVEL_OUTOF10: 8
PAINLEVEL_OUTOF10: 4

## 2019-11-20 ASSESSMENT — PAIN DESCRIPTION - PROGRESSION: CLINICAL_PROGRESSION: NOT CHANGED

## 2019-11-20 ASSESSMENT — PAIN DESCRIPTION - LOCATION: LOCATION: NECK

## 2019-11-20 ASSESSMENT — PAIN DESCRIPTION - PAIN TYPE: TYPE: ACUTE PAIN

## 2019-11-21 LAB
ANION GAP SERPL CALCULATED.3IONS-SCNC: 12 MMOL/L (ref 9–17)
BUN BLDV-MCNC: 26 MG/DL (ref 8–23)
BUN/CREAT BLD: ABNORMAL (ref 9–20)
CALCIUM SERPL-MCNC: 9 MG/DL (ref 8.6–10.4)
CHLORIDE BLD-SCNC: 94 MMOL/L (ref 98–107)
CO2: 28 MMOL/L (ref 20–31)
CREAT SERPL-MCNC: 1.45 MG/DL (ref 0.5–0.9)
GFR AFRICAN AMERICAN: 41 ML/MIN
GFR NON-AFRICAN AMERICAN: 34 ML/MIN
GFR SERPL CREATININE-BSD FRML MDRD: ABNORMAL ML/MIN/{1.73_M2}
GFR SERPL CREATININE-BSD FRML MDRD: ABNORMAL ML/MIN/{1.73_M2}
GLUCOSE BLD-MCNC: 91 MG/DL (ref 70–99)
HCT VFR BLD CALC: 32.2 % (ref 36–46)
HEMOGLOBIN: 11.1 G/DL (ref 12–16)
MCH RBC QN AUTO: 32.7 PG (ref 26–34)
MCHC RBC AUTO-ENTMCNC: 34.4 G/DL (ref 31–37)
MCV RBC AUTO: 95.2 FL (ref 80–100)
NRBC AUTOMATED: ABNORMAL PER 100 WBC
PDW BLD-RTO: 13.8 % (ref 11.5–14.9)
PLATELET # BLD: 262 K/UL (ref 150–450)
PMV BLD AUTO: 7.1 FL (ref 6–12)
POTASSIUM SERPL-SCNC: 3.7 MMOL/L (ref 3.7–5.3)
RBC # BLD: 3.38 M/UL (ref 4–5.2)
SODIUM BLD-SCNC: 134 MMOL/L (ref 135–144)
WBC # BLD: 4.4 K/UL (ref 3.5–11)

## 2019-11-21 PROCEDURE — 99232 SBSQ HOSP IP/OBS MODERATE 35: CPT | Performed by: INTERNAL MEDICINE

## 2019-11-21 PROCEDURE — 6360000002 HC RX W HCPCS: Performed by: NURSE PRACTITIONER

## 2019-11-21 PROCEDURE — G0378 HOSPITAL OBSERVATION PER HR: HCPCS

## 2019-11-21 PROCEDURE — 80048 BASIC METABOLIC PNL TOTAL CA: CPT

## 2019-11-21 PROCEDURE — 6370000000 HC RX 637 (ALT 250 FOR IP): Performed by: INTERNAL MEDICINE

## 2019-11-21 PROCEDURE — 36415 COLL VENOUS BLD VENIPUNCTURE: CPT

## 2019-11-21 PROCEDURE — 97530 THERAPEUTIC ACTIVITIES: CPT

## 2019-11-21 PROCEDURE — 2580000003 HC RX 258: Performed by: NURSE PRACTITIONER

## 2019-11-21 PROCEDURE — 6370000000 HC RX 637 (ALT 250 FOR IP): Performed by: NURSE PRACTITIONER

## 2019-11-21 PROCEDURE — 96372 THER/PROPH/DIAG INJ SC/IM: CPT

## 2019-11-21 PROCEDURE — 97116 GAIT TRAINING THERAPY: CPT

## 2019-11-21 PROCEDURE — 85027 COMPLETE CBC AUTOMATED: CPT

## 2019-11-21 RX ADMIN — ENOXAPARIN SODIUM 40 MG: 40 INJECTION SUBCUTANEOUS at 08:53

## 2019-11-21 RX ADMIN — Medication 12.5 MG: at 20:43

## 2019-11-21 RX ADMIN — FAMOTIDINE 20 MG: 20 TABLET, FILM COATED ORAL at 08:40

## 2019-11-21 RX ADMIN — BUPROPION HYDROCHLORIDE 100 MG: 100 TABLET, FILM COATED, EXTENDED RELEASE ORAL at 20:45

## 2019-11-21 RX ADMIN — RANOLAZINE 500 MG: 500 TABLET, EXTENDED RELEASE ORAL at 20:45

## 2019-11-21 RX ADMIN — POTASSIUM CHLORIDE 20 MEQ: 20 TABLET, EXTENDED RELEASE ORAL at 08:39

## 2019-11-21 RX ADMIN — ASPIRIN 81 MG: 81 TABLET ORAL at 08:42

## 2019-11-21 RX ADMIN — Medication 10 ML: at 08:48

## 2019-11-21 RX ADMIN — SPIRONOLACTONE 25 MG: 25 TABLET, FILM COATED ORAL at 08:53

## 2019-11-21 RX ADMIN — Medication 10 ML: at 20:50

## 2019-11-21 RX ADMIN — ANTI-FUNGAL POWDER MICONAZOLE NITRATE TALC FREE: 1.42 POWDER TOPICAL at 20:43

## 2019-11-21 RX ADMIN — SERTRALINE HYDROCHLORIDE 100 MG: 100 TABLET, FILM COATED ORAL at 20:44

## 2019-11-21 RX ADMIN — LORATADINE 10 MG: 10 TABLET ORAL at 08:39

## 2019-11-21 RX ADMIN — Medication 100 MCG: at 08:42

## 2019-11-21 RX ADMIN — BUMETANIDE 1 MG: 1 TABLET ORAL at 08:53

## 2019-11-21 RX ADMIN — DOCUSATE SODIUM 100 MG: 100 CAPSULE, LIQUID FILLED ORAL at 08:45

## 2019-11-21 RX ADMIN — PRIMIDONE 50 MG: 50 TABLET ORAL at 13:27

## 2019-11-21 RX ADMIN — MULTIPLE VITAMINS W/ MINERALS TAB 1 TABLET: TAB at 08:53

## 2019-11-21 RX ADMIN — LEVOTHYROXINE SODIUM 100 MCG: 0.1 TABLET ORAL at 05:58

## 2019-11-21 RX ADMIN — PRIMIDONE 50 MG: 50 TABLET ORAL at 08:41

## 2019-11-21 RX ADMIN — ACETAMINOPHEN 650 MG: 325 TABLET, FILM COATED ORAL at 14:40

## 2019-11-21 RX ADMIN — PRIMIDONE 50 MG: 50 TABLET ORAL at 20:44

## 2019-11-21 RX ADMIN — FLUTICASONE PROPIONATE 1 SPRAY: 50 SPRAY, METERED NASAL at 08:46

## 2019-11-21 RX ADMIN — BUMETANIDE 1 MG: 1 TABLET ORAL at 20:43

## 2019-11-21 RX ADMIN — MAGNESIUM HYDROXIDE 30 ML: 400 SUSPENSION ORAL at 20:43

## 2019-11-21 RX ADMIN — ANTI-FUNGAL POWDER MICONAZOLE NITRATE TALC FREE: 1.42 POWDER TOPICAL at 08:40

## 2019-11-21 RX ADMIN — BUPROPION HYDROCHLORIDE 100 MG: 100 TABLET, FILM COATED, EXTENDED RELEASE ORAL at 08:47

## 2019-11-21 RX ADMIN — ACETAMINOPHEN 650 MG: 325 TABLET, FILM COATED ORAL at 08:45

## 2019-11-21 RX ADMIN — RANOLAZINE 500 MG: 500 TABLET, EXTENDED RELEASE ORAL at 08:41

## 2019-11-21 ASSESSMENT — PAIN DESCRIPTION - FREQUENCY
FREQUENCY: CONTINUOUS
FREQUENCY: CONTINUOUS

## 2019-11-21 ASSESSMENT — PAIN SCALES - GENERAL
PAINLEVEL_OUTOF10: 5
PAINLEVEL_OUTOF10: 3
PAINLEVEL_OUTOF10: 2
PAINLEVEL_OUTOF10: 2
PAINLEVEL_OUTOF10: 3

## 2019-11-21 ASSESSMENT — PAIN DESCRIPTION - PROGRESSION
CLINICAL_PROGRESSION: NOT CHANGED

## 2019-11-21 ASSESSMENT — PAIN DESCRIPTION - PAIN TYPE
TYPE: CHRONIC PAIN

## 2019-11-21 ASSESSMENT — PAIN DESCRIPTION - LOCATION
LOCATION: HEAD;NECK
LOCATION: HEAD;NECK
LOCATION: NECK

## 2019-11-21 ASSESSMENT — PAIN DESCRIPTION - ORIENTATION: ORIENTATION: RIGHT;LEFT

## 2019-11-22 VITALS
SYSTOLIC BLOOD PRESSURE: 125 MMHG | DIASTOLIC BLOOD PRESSURE: 64 MMHG | TEMPERATURE: 98.1 F | OXYGEN SATURATION: 94 % | HEART RATE: 78 BPM | WEIGHT: 174 LBS | HEIGHT: 58 IN | RESPIRATION RATE: 16 BRPM | BODY MASS INDEX: 36.53 KG/M2

## 2019-11-22 LAB
HCT VFR BLD CALC: 32.7 % (ref 36–46)
HEMOGLOBIN: 11 G/DL (ref 12–16)
MCH RBC QN AUTO: 32 PG (ref 26–34)
MCHC RBC AUTO-ENTMCNC: 33.5 G/DL (ref 31–37)
MCV RBC AUTO: 95.4 FL (ref 80–100)
NRBC AUTOMATED: ABNORMAL PER 100 WBC
PDW BLD-RTO: 13.8 % (ref 11.5–14.9)
PLATELET # BLD: 265 K/UL (ref 150–450)
PMV BLD AUTO: 7.4 FL (ref 6–12)
RBC # BLD: 3.43 M/UL (ref 4–5.2)
WBC # BLD: 3.8 K/UL (ref 3.5–11)

## 2019-11-22 PROCEDURE — 2580000003 HC RX 258: Performed by: NURSE PRACTITIONER

## 2019-11-22 PROCEDURE — 97116 GAIT TRAINING THERAPY: CPT

## 2019-11-22 PROCEDURE — G0378 HOSPITAL OBSERVATION PER HR: HCPCS

## 2019-11-22 PROCEDURE — 36415 COLL VENOUS BLD VENIPUNCTURE: CPT

## 2019-11-22 PROCEDURE — 6370000000 HC RX 637 (ALT 250 FOR IP): Performed by: INTERNAL MEDICINE

## 2019-11-22 PROCEDURE — 6360000002 HC RX W HCPCS: Performed by: NURSE PRACTITIONER

## 2019-11-22 PROCEDURE — 85027 COMPLETE CBC AUTOMATED: CPT

## 2019-11-22 PROCEDURE — 99239 HOSP IP/OBS DSCHRG MGMT >30: CPT | Performed by: INTERNAL MEDICINE

## 2019-11-22 PROCEDURE — 6370000000 HC RX 637 (ALT 250 FOR IP): Performed by: NURSE PRACTITIONER

## 2019-11-22 PROCEDURE — 97110 THERAPEUTIC EXERCISES: CPT

## 2019-11-22 PROCEDURE — 96372 THER/PROPH/DIAG INJ SC/IM: CPT

## 2019-11-22 RX ORDER — BUPROPION HYDROCHLORIDE 100 MG/1
100 TABLET, EXTENDED RELEASE ORAL 2 TIMES DAILY
Qty: 60 TABLET | Refills: 3 | Status: SHIPPED | OUTPATIENT
Start: 2019-11-22 | End: 2020-01-16 | Stop reason: SDUPTHER

## 2019-11-22 RX ORDER — METOLAZONE 2.5 MG/1
2.5 TABLET ORAL EVERY OTHER DAY
Qty: 30 TABLET | Refills: 3 | COMMUNITY
Start: 2019-11-25

## 2019-11-22 RX ORDER — SPIRONOLACTONE 25 MG/1
25 TABLET ORAL DAILY
Qty: 30 TABLET | Refills: 3 | Status: SHIPPED | OUTPATIENT
Start: 2019-11-23 | End: 2020-03-03

## 2019-11-22 RX ORDER — BUMETANIDE 1 MG/1
1 TABLET ORAL 2 TIMES DAILY
Qty: 60 TABLET | Refills: 3 | Status: SHIPPED | OUTPATIENT
Start: 2019-11-22 | End: 2020-01-16 | Stop reason: SDUPTHER

## 2019-11-22 RX ADMIN — FAMOTIDINE 20 MG: 20 TABLET, FILM COATED ORAL at 10:08

## 2019-11-22 RX ADMIN — SPIRONOLACTONE 25 MG: 25 TABLET, FILM COATED ORAL at 10:10

## 2019-11-22 RX ADMIN — PRIMIDONE 50 MG: 50 TABLET ORAL at 10:05

## 2019-11-22 RX ADMIN — FLUTICASONE PROPIONATE 1 SPRAY: 50 SPRAY, METERED NASAL at 10:09

## 2019-11-22 RX ADMIN — BUPROPION HYDROCHLORIDE 100 MG: 100 TABLET, FILM COATED, EXTENDED RELEASE ORAL at 10:12

## 2019-11-22 RX ADMIN — PRIMIDONE 50 MG: 50 TABLET ORAL at 13:14

## 2019-11-22 RX ADMIN — DOCUSATE SODIUM 100 MG: 100 CAPSULE, LIQUID FILLED ORAL at 10:06

## 2019-11-22 RX ADMIN — RANOLAZINE 500 MG: 500 TABLET, EXTENDED RELEASE ORAL at 10:04

## 2019-11-22 RX ADMIN — BUMETANIDE 1 MG: 1 TABLET ORAL at 10:09

## 2019-11-22 RX ADMIN — Medication 100 MCG: at 10:09

## 2019-11-22 RX ADMIN — ACETAMINOPHEN 650 MG: 325 TABLET, FILM COATED ORAL at 11:30

## 2019-11-22 RX ADMIN — Medication 10 ML: at 10:11

## 2019-11-22 RX ADMIN — LORATADINE 10 MG: 10 TABLET ORAL at 10:03

## 2019-11-22 RX ADMIN — METOLAZONE 2.5 MG: 2.5 TABLET ORAL at 10:07

## 2019-11-22 RX ADMIN — ENOXAPARIN SODIUM 40 MG: 40 INJECTION SUBCUTANEOUS at 10:11

## 2019-11-22 RX ADMIN — ASPIRIN 81 MG: 81 TABLET ORAL at 10:02

## 2019-11-22 RX ADMIN — MULTIPLE VITAMINS W/ MINERALS TAB 1 TABLET: TAB at 10:09

## 2019-11-22 RX ADMIN — ANTI-FUNGAL POWDER MICONAZOLE NITRATE TALC FREE: 1.42 POWDER TOPICAL at 10:11

## 2019-11-22 RX ADMIN — POTASSIUM CHLORIDE 20 MEQ: 20 TABLET, EXTENDED RELEASE ORAL at 10:03

## 2019-11-22 ASSESSMENT — PAIN SCALES - GENERAL
PAINLEVEL_OUTOF10: 4
PAINLEVEL_OUTOF10: 6
PAINLEVEL_OUTOF10: 4

## 2019-11-22 ASSESSMENT — PAIN DESCRIPTION - ORIENTATION: ORIENTATION: RIGHT;LEFT

## 2019-11-22 ASSESSMENT — PAIN DESCRIPTION - PAIN TYPE
TYPE: CHRONIC PAIN
TYPE: CHRONIC PAIN

## 2019-11-22 ASSESSMENT — PAIN DESCRIPTION - LOCATION
LOCATION: NECK
LOCATION: NECK

## 2019-12-08 DIAGNOSIS — G25.0 ESSENTIAL TREMOR: ICD-10-CM

## 2019-12-10 RX ORDER — PRIMIDONE 50 MG/1
50 TABLET ORAL 3 TIMES DAILY
Qty: 90 TABLET | Refills: 2 | Status: SHIPPED
Start: 2019-12-10 | End: 2020-02-06 | Stop reason: DRUGHIGH

## 2019-12-13 ENCOUNTER — TELEPHONE (OUTPATIENT)
Dept: GASTROENTEROLOGY | Age: 84
End: 2019-12-13

## 2019-12-19 PROBLEM — J06.9 URI (UPPER RESPIRATORY INFECTION): Status: RESOLVED | Noted: 2019-11-19 | Resolved: 2019-12-19

## 2019-12-26 RX ORDER — POTASSIUM CHLORIDE 20 MEQ/1
20 TABLET, EXTENDED RELEASE ORAL DAILY
Qty: 90 TABLET | Refills: 1 | Status: SHIPPED | OUTPATIENT
Start: 2019-12-26 | End: 2020-03-03

## 2020-01-16 ENCOUNTER — OFFICE VISIT (OUTPATIENT)
Dept: INTERNAL MEDICINE CLINIC | Age: 85
End: 2020-01-16
Payer: COMMERCIAL

## 2020-01-16 VITALS
SYSTOLIC BLOOD PRESSURE: 126 MMHG | OXYGEN SATURATION: 99 % | HEART RATE: 72 BPM | BODY MASS INDEX: 38.83 KG/M2 | WEIGHT: 185 LBS | HEIGHT: 58 IN | RESPIRATION RATE: 15 BRPM | DIASTOLIC BLOOD PRESSURE: 74 MMHG

## 2020-01-16 PROCEDURE — 1111F DSCHRG MED/CURRENT MED MERGE: CPT | Performed by: INTERNAL MEDICINE

## 2020-01-16 PROCEDURE — 99214 OFFICE O/P EST MOD 30 MIN: CPT | Performed by: INTERNAL MEDICINE

## 2020-01-16 RX ORDER — ACETAMINOPHEN 325 MG/1
650 TABLET ORAL EVERY 6 HOURS PRN
COMMUNITY

## 2020-01-16 RX ORDER — BUPROPION HYDROCHLORIDE 100 MG/1
100 TABLET, EXTENDED RELEASE ORAL 2 TIMES DAILY
Qty: 60 TABLET | Refills: 3 | Status: SHIPPED | OUTPATIENT
Start: 2020-01-16 | End: 2020-04-27

## 2020-01-16 RX ORDER — GUAIFENESIN AND DEXTROMETHORPHAN HYDROBROMIDE 600; 30 MG/1; MG/1
TABLET, EXTENDED RELEASE ORAL
COMMUNITY
End: 2020-02-06

## 2020-01-16 RX ORDER — CALORIC SUPPLEMENT
1 LIQUID (ML) ORAL 2 TIMES DAILY
Qty: 60 BOTTLE | Refills: 2 | Status: SHIPPED | OUTPATIENT
Start: 2020-01-16 | End: 2020-01-16

## 2020-01-16 RX ORDER — BENZONATATE 100 MG/1
100-200 CAPSULE ORAL 3 TIMES DAILY PRN
Qty: 60 CAPSULE | Refills: 0 | Status: SHIPPED | OUTPATIENT
Start: 2020-01-16 | End: 2020-01-23

## 2020-01-16 RX ORDER — HYDROCODONE BITARTRATE AND ACETAMINOPHEN 5; 325 MG/1; MG/1
1 TABLET ORAL EVERY 6 HOURS PRN
COMMUNITY
End: 2020-02-06

## 2020-01-16 RX ORDER — CALORIC SUPPLEMENT
1 LIQUID (ML) ORAL 2 TIMES DAILY
Qty: 60 BOTTLE | Refills: 2 | Status: SHIPPED | OUTPATIENT
Start: 2020-01-16

## 2020-01-16 RX ORDER — LEVOTHYROXINE SODIUM 0.1 MG/1
100 TABLET ORAL DAILY
Qty: 30 TABLET | Refills: 11 | Status: SHIPPED | OUTPATIENT
Start: 2020-01-16

## 2020-01-16 RX ORDER — NITROGLYCERIN 0.4 MG/1
TABLET SUBLINGUAL
Qty: 25 TABLET | Refills: 3 | Status: SHIPPED | OUTPATIENT
Start: 2020-01-16

## 2020-01-16 RX ORDER — BUMETANIDE 1 MG/1
1 TABLET ORAL 2 TIMES DAILY
Qty: 60 TABLET | Refills: 3 | Status: SHIPPED | OUTPATIENT
Start: 2020-01-16 | End: 2020-02-04 | Stop reason: SDUPTHER

## 2020-01-16 NOTE — PROGRESS NOTES
2-5) and Complex Care (Scores >=6): 4     Non face to face  following discharge, date last encounter closed (first attempt may have been earlier): *No documented post hospital discharge outreach found in the last 14 days    Call initiated 2 business days of discharge: *No response recorded in the last 14 days    Patient Active Problem List   Diagnosis    Reactive depression    Osteoarthritis    CKD (chronic kidney disease) stage 4, GFR 15-29 ml/min ([facility])    Primary osteoarthritis involving multiple joints    GERD (gastroesophageal reflux disease)    History of total abdominal hysterectomy    History of skin cancer    Sleep apnea    HTN (hypertension)    Acquired hypothyroidism    Unilateral inguinal hernia without obstruction or gangrene    Coronary artery disease involving native coronary artery of native heart with unstable angina pectoris ([facility])    Chronic diastolic heart failure ([facility])    Hordeolum externum of right upper eyelid    Esophageal dysphagia    Constipation    [facility] infarction) [facility] [place] [address]    Hypothyroidism    COPD (chronic obstructive pulmonary disease) ([facility] [place] [address]    Chronic kidney disease    CHF (congestive heart failure) ([facility] [place] [address]    Leg wound, right, initial encounter    Throat discomfort    CHF (congestive heart failure), NYHA class I, acute on chronic, combined ([facility])    Congestive heart failure ([facility])    Hyponatremia    MCI (mild cognitive impairment)    Diuretic-induced hypokalemia       Allergies   Allergen Reactions    Dye [Iodides] Hives     IVP dye caused reaction with hives        Medications listed as ordered at the time of discharge from [facility]   [facility]it, [address] Medication Instructions LV:    Printed on:01/16/20 9709   Medication Information                      acetaminophen (TYLENOL) 325 MG tablet  Take 650 mg by mouth every 6 hours as needed for Pain             aspirin 81 MG EC tablet  Take 81 mg by mouth daily              bumetanide (BUMEX) 1 MG tablet  Take 1 tablet by mouth 2 times daily             buPROPion (WELLBUTRIN SR) 100 MG extended release tablet  Take 1 tablet by mouth 2 times daily             cyanocobalamin 1000 MCG tablet  Take 1,000 mcg by mouth daily             Dextromethorphan-guaiFENesin (MUCINEX DM)  MG TB12  Take by mouth             docusate sodium (COLACE, DULCOLAX) 100 MG CAPS  Take 100 mg by mouth daily             famotidine (PEPCID) 20 MG tablet  TAKE 1 TABLET BY MOUTH 2 TIMES DAILY             fluticasone (FLONASE) 50 MCG/ACT nasal spray  1 spray by Each Nare route daily 1 Spray in each nostril             HYDROcodone-acetaminophen (NORCO) 5-325 MG per tablet  Take 1 tablet by mouth every 6 hours as needed for Pain.             levothyroxine (SYNTHROID) 100 MCG tablet  Take 1 tablet by mouth daily             loratadine (CLARITIN) 10 MG tablet  Take by mouth daily              metOLazone (ZAROXOLYN) 2.5 MG tablet  Take 1 tablet by mouth every 72 hours             metoprolol tartrate (LOPRESSOR) 25 MG tablet  TAKE 1/2 TABLET BY MOUTH DAILY             miconazole (MICOTIN) 2 % powder  Apply topically 2 times daily. Multiple Vitamins-Minerals (THERAPEUTIC MULTIVITAMIN-MINERALS) tablet  Take 1 tablet by mouth daily. nitroGLYCERIN (NITROSTAT) 0.4 MG SL tablet  up to max of 3 total doses. If no relief after 1 dose, call 911.              potassium chloride (KLOR-CON M) 20 MEQ extended release tablet  TAKE 1 TABLET BY MOUTH DAILY             primidone (MYSOLINE) 50 MG tablet  Take 1 tablet by mouth 3 times daily             ranolazine (RANEXA) 500 MG extended release tablet  Take 500 mg by mouth 2 times daily             sertraline (ZOLOFT) 100 MG tablet  Take 100 mg by mouth nightly             spironolactone (ALDACTONE) 25 MG tablet  Take 1 tablet by mouth daily                   Medications marked \"taking\" at this time  Outpatient Medications Marked as Taking for the 1/16/20 encounter (Office Visit) with Maksim Wilson MD   Medication Sig Dispense Refill    acetaminophen (TYLENOL) 325 MG tablet Take 650 mg by mouth every 6 hours as needed for Pain      HYDROcodone-acetaminophen (NORCO) 5-325 MG per tablet Take 1 tablet by mouth every 6 hours as needed for Pain.  Dextromethorphan-guaiFENesin (MUCINEX DM)  MG TB12 Take by mouth      metoprolol tartrate (LOPRESSOR) 25 MG tablet TAKE 1/2 TABLET BY MOUTH DAILY 30 tablet 11    potassium chloride (KLOR-CON M) 20 MEQ extended release tablet TAKE 1 TABLET BY MOUTH DAILY 90 tablet 1    primidone (MYSOLINE) 50 MG tablet Take 1 tablet by mouth 3 times daily 90 tablet 2    buPROPion (WELLBUTRIN SR) 100 MG extended release tablet Take 1 tablet by mouth 2 times daily 60 tablet 3    bumetanide (BUMEX) 1 MG tablet Take 1 tablet by mouth 2 times daily 60 tablet 3    metOLazone (ZAROXOLYN) 2.5 MG tablet Take 1 tablet by mouth every 72 hours 30 tablet 3    spironolactone (ALDACTONE) 25 MG tablet Take 1 tablet by mouth daily 30 tablet 3    famotidine (PEPCID) 20 MG tablet TAKE 1 TABLET BY MOUTH 2 TIMES DAILY 60 tablet 8    loratadine (CLARITIN) 10 MG tablet Take by mouth daily   5    miconazole (MICOTIN) 2 % powder Apply topically 2 times daily. 45 g 1    sertraline (ZOLOFT) 100 MG tablet Take 100 mg by mouth nightly      cyanocobalamin 1000 MCG tablet Take 1,000 mcg by mouth daily      nitroGLYCERIN (NITROSTAT) 0.4 MG SL tablet up to max of 3 total doses.  If no relief after 1 dose, call 911. 25 tablet 3    fluticasone (FLONASE) 50 MCG/ACT nasal spray 1 spray by Each Nare route daily 1 Spray in each nostril 2 Bottle 1    levothyroxine (SYNTHROID) 100 MCG tablet Take 1 tablet by mouth daily 30 tablet 11    ranolazine (RANEXA) 500 MG extended release tablet Take 500 mg by mouth 2 times daily      docusate sodium (COLACE, DULCOLAX) 100 MG CAPS Take 100 mg by mouth daily 30 capsule 3    aspirin 81 MG EC tablet Take 81 mg by mouth daily       Multiple

## 2020-02-03 ENCOUNTER — TELEPHONE (OUTPATIENT)
Dept: INTERNAL MEDICINE CLINIC | Age: 85
End: 2020-02-03

## 2020-02-03 NOTE — TELEPHONE ENCOUNTER
Patient was discharged from the hospital back in January and is now beginning to act the same as before she had went so contact is asking if some labs may be orderd for patient to have them tested as patient is feeling tired and low energy and patient has also gained some weight. An appointemtn was offered, but declined. Please call contact back with any questions and to follow up. Thank You.

## 2020-02-04 ENCOUNTER — OFFICE VISIT (OUTPATIENT)
Dept: INTERNAL MEDICINE CLINIC | Age: 85
End: 2020-02-04
Payer: COMMERCIAL

## 2020-02-04 ENCOUNTER — HOSPITAL ENCOUNTER (OUTPATIENT)
Age: 85
Setting detail: SPECIMEN
Discharge: HOME | End: 2020-02-04
Payer: COMMERCIAL

## 2020-02-04 VITALS
WEIGHT: 188.9 LBS | OXYGEN SATURATION: 91 % | BODY MASS INDEX: 39.65 KG/M2 | DIASTOLIC BLOOD PRESSURE: 49 MMHG | HEIGHT: 58 IN | HEART RATE: 72 BPM | SYSTOLIC BLOOD PRESSURE: 120 MMHG | TEMPERATURE: 98.4 F

## 2020-02-04 LAB
ABSOLUTE EOS #: 0.42 K/UL (ref 0–0.44)
ABSOLUTE IMMATURE GRANULOCYTE: <0.03 K/UL (ref 0–0.3)
ABSOLUTE LYMPH #: 0.98 K/UL (ref 1.1–3.7)
ABSOLUTE MONO #: 0.66 K/UL (ref 0.1–1.2)
ANION GAP SERPL CALCULATED.3IONS-SCNC: 15 MMOL/L (ref 9–17)
BASOPHILS # BLD: 1 % (ref 0–2)
BASOPHILS ABSOLUTE: 0.06 K/UL (ref 0–0.2)
BUN BLDV-MCNC: 27 MG/DL (ref 8–23)
BUN/CREAT BLD: ABNORMAL (ref 9–20)
CALCIUM SERPL-MCNC: 9.2 MG/DL (ref 8.6–10.4)
CHLORIDE BLD-SCNC: 88 MMOL/L (ref 98–107)
CO2: 25 MMOL/L (ref 20–31)
CREAT SERPL-MCNC: 1.41 MG/DL (ref 0.5–0.9)
DIFFERENTIAL TYPE: ABNORMAL
EOSINOPHILS RELATIVE PERCENT: 8 % (ref 1–4)
GFR AFRICAN AMERICAN: 42 ML/MIN
GFR NON-AFRICAN AMERICAN: 35 ML/MIN
GFR SERPL CREATININE-BSD FRML MDRD: ABNORMAL ML/MIN/{1.73_M2}
GFR SERPL CREATININE-BSD FRML MDRD: ABNORMAL ML/MIN/{1.73_M2}
GLUCOSE BLD-MCNC: 91 MG/DL (ref 70–99)
HCT VFR BLD CALC: 37.9 % (ref 36.3–47.1)
HEMOGLOBIN: 12.1 G/DL (ref 11.9–15.1)
IMMATURE GRANULOCYTES: 0 %
LYMPHOCYTES # BLD: 18 % (ref 24–43)
MCH RBC QN AUTO: 30.9 PG (ref 25.2–33.5)
MCHC RBC AUTO-ENTMCNC: 31.9 G/DL (ref 28.4–34.8)
MCV RBC AUTO: 96.7 FL (ref 82.6–102.9)
MONOCYTES # BLD: 12 % (ref 3–12)
NRBC AUTOMATED: 0 PER 100 WBC
PDW BLD-RTO: 14.5 % (ref 11.8–14.4)
PLATELET # BLD: 263 K/UL (ref 138–453)
PLATELET ESTIMATE: ABNORMAL
PMV BLD AUTO: 10.5 FL (ref 8.1–13.5)
POTASSIUM SERPL-SCNC: 4.3 MMOL/L (ref 3.7–5.3)
RBC # BLD: 3.92 M/UL (ref 3.95–5.11)
RBC # BLD: ABNORMAL 10*6/UL
SEG NEUTROPHILS: 61 % (ref 36–65)
SEGMENTED NEUTROPHILS ABSOLUTE COUNT: 3.35 K/UL (ref 1.5–8.1)
SODIUM BLD-SCNC: 128 MMOL/L (ref 135–144)
TSH SERPL DL<=0.05 MIU/L-ACNC: 3.5 MIU/L (ref 0.3–5)
WBC # BLD: 5.5 K/UL (ref 3.5–11.3)
WBC # BLD: ABNORMAL 10*3/UL

## 2020-02-04 PROCEDURE — 99214 OFFICE O/P EST MOD 30 MIN: CPT | Performed by: PHYSICIAN ASSISTANT

## 2020-02-04 RX ORDER — BUMETANIDE 2 MG/1
2 TABLET ORAL 2 TIMES DAILY
Qty: 60 TABLET | Refills: 2 | Status: ON HOLD
Start: 2020-02-04 | End: 2020-02-08 | Stop reason: HOSPADM

## 2020-02-04 NOTE — PROGRESS NOTES
upper eyelid    Esophageal dysphagia    Constipation    [facility]) [facility] [place] [address]    Hypothyroidism    [facility] disease) [facility]    Chronic kidney disease    CHF (congestive heart failure) ([facility])    Leg wound, right, initial encounter    Throat discomfort    CHF (congestive heart failure), NYHA class I, acute on chronic, combined ([facility])    Congestive heart failure ([facility])    Hyponatremia    MCI (mild cognitive impairment)    Diuretic-induced hypokalemia     MEDICATIONS:      Current Outpatient Medications   Medication Sig Dispense Refill    bumetanide (BUMEX) 2 MG tablet Take 1 tablet by mouth 2 times daily 60 tablet 2    acetaminophen (TYLENOL) 325 MG tablet Take 650 mg by mouth every 6 hours as needed for Pain      HYDROcodone-acetaminophen (NORCO) 5-325 MG per tablet Take 1 tablet by mouth every 6 hours as needed for Pain.  Dextromethorphan-guaiFENesin (MUCINEX DM)  MG TB12 Take by mouth      miconazole (MICOTIN) 2 % powder Apply topically 2 times daily. 45 g 1    levothyroxine (SYNTHROID) 100 MCG tablet Take 1 tablet by mouth daily 30 tablet 11    buPROPion (WELLBUTRIN SR) 100 MG extended release tablet Take 1 tablet by mouth 2 times daily 60 tablet 3    nitroGLYCERIN (NITROSTAT) 0.4 MG SL tablet up to max of 3 total doses.  If no relief after 1 dose, call 911. 25 tablet 3    Nutritional Supplements (ENSURE ORIGINAL) LIQD Take 1 Bottle by mouth 2 times daily 60 Bottle 2    metoprolol tartrate (LOPRESSOR) 25 MG tablet TAKE 1/2 TABLET BY MOUTH DAILY 30 tablet 11    potassium chloride (KLOR-CON M) 20 MEQ extended release tablet TAKE 1 TABLET BY MOUTH DAILY 90 tablet 1    primidone (MYSOLINE) 50 MG tablet Take 1 tablet by mouth 3 times daily 90 tablet 2    metOLazone (ZAROXOLYN) 2.5 MG tablet Take 1 tablet by mouth every 72 hours 30 tablet 3    spironolactone (ALDACTONE) 25 MG tablet Take 1 tablet by mouth daily 30 tablet 3    famotidine (PEPCID) 20 MG tablet TAKE 1 TABLET BY MOUTH 2 TIMES DAILY 60 tablet 8    loratadine (CLARITIN) 10 MG tablet Take by mouth daily   5    sertraline (ZOLOFT) 100 MG tablet Take 100 mg by mouth nightly      cyanocobalamin 1000 MCG tablet Take 1,000 mcg by mouth daily      fluticasone (FLONASE) 50 MCG/ACT nasal spray 1 spray by Each Nare route daily 1 Spray in each nostril 2 Bottle 1    ranolazine (RANEXA) 500 MG extended release tablet Take 500 mg by mouth 2 times daily      docusate sodium (COLACE, DULCOLAX) 100 MG CAPS Take 100 mg by mouth daily 30 capsule 3    aspirin 81 MG EC tablet Take 81 mg by mouth daily       Multiple Vitamins-Minerals (THERAPEUTIC MULTIVITAMIN-MINERALS) tablet Take 1 tablet by mouth daily. No current facility-administered medications for this visit. ALLERGIES:      Allergies   Allergen Reactions    Dye [Iodides] Hives     IVP dye caused reaction with hives      SOCIAL HISTORY   Reviewed and no change from previous record. [place]  reports that she has quit smoking. She has never used smokeless tobacco.    FAMILY HISTORY:    Reviewed and No change from previous visit    REVIEW OF SYSTEMS:    Review of Systems   Constitutional: Positive for fatigue and unexpected weight change. Negative for appetite change, diaphoresis and fever. HENT: Negative for congestion, ear pain, sinus pressure, sinus pain and sore throat. Respiratory: Positive for shortness of breath (exertional). Negative for cough, chest tightness and wheezing. Cardiovascular: Positive for leg swelling (bilateral). Negative for chest pain and palpitations. Gastrointestinal: Negative for abdominal pain, blood in stool, constipation, diarrhea, nausea and vomiting. Endocrine: Negative for cold intolerance and heat intolerance. Genitourinary: Negative for difficulty urinating, dysuria, flank pain, frequency, hematuria and urgency. Skin: Negative for pallor and rash.    Neurological: Negative for dizziness, weakness, light-headedness, numbness and headaches. Hematological: Negative for adenopathy. PHYSICAL EXAM:      Vitals:    02/04/20 1516 02/04/20 1518   BP: (!) 122/53 (!) 120/49   Site: Left Upper Arm    Position: Sitting    Cuff Size: Large Adult    Pulse: 72    Temp: 98.4 °F (36.9 °C)    TempSrc: Oral    SpO2: 91%    Weight: 188 lb 14.4 oz (85.7 kg)    Height: 4' 9.99\" (1.473 m)      Wt Readings from Last 3 Encounters:   02/04/20 188 lb 14.4 oz (85.7 kg)   01/16/20 185 lb (83.9 kg)   11/21/19 174 lb (78.9 kg)     General -alert, well appearing, in no distress  Skin - normal coloration and turgor, no rash  Mouth - mucous membranes are moist, pharynx normal without lesions  Neck - supple, no significant adenopathy  Lymphatics - no palpable lymphadenopathy, no hepatosplenomegaly  Chest - clear to auscultation, no wheezes, rales or rhonchi, symmetric air entry  Heart - normal rate, regular rhythm, no murmur  Abdomen - soft, nontender, nondistended, no masses or organomegaly  Back - no flank tenderness  Neurological - alert, oriented x 2, normal speech, no focal sensory or motor deficit  Musculoskeletal - no joint tenderness, deformity or swelling  Extremities - peripheral pulses normal, 2+ pedal edema bilaterally    ASSESSMENT AND PLAN:      1. CHF (congestive heart failure), NYHA class I, acute on chronic, combined ([facility])  - Worsening, weight increased, pedal edema, increase Bumex to 2 mg BID, monitor electrolytes  -- Basic Metabolic Panel; Future  -- bumetanide (BUMEX) 2 MG tablet; Take 1 tablet by mouth 2 times daily  Dispense: 60 tablet; Refill: 2  - Advised compression stockings, leg elevation    2. CKD (chronic kidney disease) stage 4, GFR 15-29 ml/min ([facility])  - Stable, increasing diuretics, monitor Cr  -- Basic Metabolic Panel; Future    3. Hyponatremia  - Basic Metabolic Panel; Future    4. Acquired hypothyroidism  - TSH without Reflex; Future    5.  Fatigue, unspecified type  - CBC Auto Differential;

## 2020-02-05 ASSESSMENT — ENCOUNTER SYMPTOMS
ABDOMINAL PAIN: 0
SINUS PRESSURE: 0
SINUS PAIN: 0
VOMITING: 0
COUGH: 0
SHORTNESS OF BREATH: 1
SORE THROAT: 0
DIARRHEA: 0
CONSTIPATION: 0
NAUSEA: 0
BLOOD IN STOOL: 0
CHEST TIGHTNESS: 0
WHEEZING: 0

## 2020-02-06 ENCOUNTER — TELEPHONE (OUTPATIENT)
Dept: INTERNAL MEDICINE CLINIC | Age: 85
End: 2020-02-06

## 2020-02-06 ENCOUNTER — HOSPITAL ENCOUNTER (INPATIENT)
Age: 85
LOS: 2 days | Discharge: HOME | DRG: 641 | End: 2020-02-08
Attending: EMERGENCY MEDICINE | Admitting: INTERNAL MEDICINE
Payer: COMMERCIAL

## 2020-02-06 LAB
ANION GAP SERPL CALCULATED.3IONS-SCNC: 12 MMOL/L (ref 9–17)
BILIRUBIN URINE: NEGATIVE
BNP INTERPRETATION: ABNORMAL
BUN BLDV-MCNC: 28 MG/DL (ref 8–23)
BUN/CREAT BLD: ABNORMAL (ref 9–20)
CALCIUM SERPL-MCNC: 9.2 MG/DL (ref 8.6–10.4)
CHLORIDE BLD-SCNC: 91 MMOL/L (ref 98–107)
CO2: 28 MMOL/L (ref 20–31)
COLOR: YELLOW
COMMENT UA: NORMAL
CREAT SERPL-MCNC: 1.52 MG/DL (ref 0.5–0.9)
GFR AFRICAN AMERICAN: 39 ML/MIN
GFR NON-AFRICAN AMERICAN: 32 ML/MIN
GFR SERPL CREATININE-BSD FRML MDRD: ABNORMAL ML/MIN/{1.73_M2}
GFR SERPL CREATININE-BSD FRML MDRD: ABNORMAL ML/MIN/{1.73_M2}
GLUCOSE BLD-MCNC: 96 MG/DL (ref 70–99)
GLUCOSE URINE: NEGATIVE
KETONES, URINE: NEGATIVE
LEUKOCYTE ESTERASE, URINE: NEGATIVE
MAGNESIUM: 2.4 MG/DL (ref 1.6–2.6)
NITRITE, URINE: NEGATIVE
PH UA: 5.5 (ref 5–8)
POTASSIUM SERPL-SCNC: 3.3 MMOL/L (ref 3.7–5.3)
PRO-BNP: 330 PG/ML
PROCALCITONIN: 0.06 NG/ML
PROTEIN UA: NEGATIVE
SERUM OSMOLALITY: 286 MOSM/KG (ref 275–295)
SODIUM BLD-SCNC: 131 MMOL/L (ref 135–144)
SPECIFIC GRAVITY UA: 1.01 (ref 1–1.03)
TURBIDITY: CLEAR
URINE HGB: NEGATIVE
UROBILINOGEN, URINE: NORMAL

## 2020-02-06 PROCEDURE — 2580000003 HC RX 258: Performed by: STUDENT IN AN ORGANIZED HEALTH CARE EDUCATION/TRAINING PROGRAM

## 2020-02-06 PROCEDURE — 6360000002 HC RX W HCPCS: Performed by: STUDENT IN AN ORGANIZED HEALTH CARE EDUCATION/TRAINING PROGRAM

## 2020-02-06 PROCEDURE — 99284 EMERGENCY DEPT VISIT MOD MDM: CPT

## 2020-02-06 PROCEDURE — 36415 COLL VENOUS BLD VENIPUNCTURE: CPT

## 2020-02-06 PROCEDURE — 83880 ASSAY OF NATRIURETIC PEPTIDE: CPT

## 2020-02-06 PROCEDURE — 83735 ASSAY OF MAGNESIUM: CPT

## 2020-02-06 PROCEDURE — 2580000003 HC RX 258: Performed by: EMERGENCY MEDICINE

## 2020-02-06 PROCEDURE — 80048 BASIC METABOLIC PNL TOTAL CA: CPT

## 2020-02-06 PROCEDURE — 6370000000 HC RX 637 (ALT 250 FOR IP): Performed by: STUDENT IN AN ORGANIZED HEALTH CARE EDUCATION/TRAINING PROGRAM

## 2020-02-06 PROCEDURE — 6370000000 HC RX 637 (ALT 250 FOR IP): Performed by: EMERGENCY MEDICINE

## 2020-02-06 PROCEDURE — 1200000000 HC SEMI PRIVATE

## 2020-02-06 PROCEDURE — 81003 URINALYSIS AUTO W/O SCOPE: CPT

## 2020-02-06 PROCEDURE — 83930 ASSAY OF BLOOD OSMOLALITY: CPT

## 2020-02-06 PROCEDURE — 84145 PROCALCITONIN (PCT): CPT

## 2020-02-06 RX ORDER — POTASSIUM CHLORIDE 20 MEQ/1
20 TABLET, EXTENDED RELEASE ORAL DAILY
Status: DISCONTINUED | OUTPATIENT
Start: 2020-02-06 | End: 2020-02-08 | Stop reason: HOSPADM

## 2020-02-06 RX ORDER — FAMOTIDINE 20 MG/1
20 TABLET, FILM COATED ORAL 2 TIMES DAILY
Status: DISCONTINUED | OUTPATIENT
Start: 2020-02-06 | End: 2020-02-08 | Stop reason: HOSPADM

## 2020-02-06 RX ORDER — SODIUM CHLORIDE 0.9 % (FLUSH) 0.9 %
10 SYRINGE (ML) INJECTION EVERY 12 HOURS SCHEDULED
Status: DISCONTINUED | OUTPATIENT
Start: 2020-02-06 | End: 2020-02-08 | Stop reason: HOSPADM

## 2020-02-06 RX ORDER — PRIMIDONE 50 MG/1
50 TABLET ORAL 2 TIMES DAILY
Status: DISCONTINUED | OUTPATIENT
Start: 2020-02-06 | End: 2020-02-08 | Stop reason: HOSPADM

## 2020-02-06 RX ORDER — BUPROPION HYDROCHLORIDE 100 MG/1
100 TABLET, EXTENDED RELEASE ORAL 2 TIMES DAILY
Status: DISCONTINUED | OUTPATIENT
Start: 2020-02-06 | End: 2020-02-08 | Stop reason: HOSPADM

## 2020-02-06 RX ORDER — SODIUM CHLORIDE 0.9 % (FLUSH) 0.9 %
10 SYRINGE (ML) INJECTION PRN
Status: DISCONTINUED | OUTPATIENT
Start: 2020-02-06 | End: 2020-02-08 | Stop reason: HOSPADM

## 2020-02-06 RX ORDER — GUAIFENESIN 600 MG/1
1200 TABLET, EXTENDED RELEASE ORAL 2 TIMES DAILY PRN
COMMUNITY
End: 2020-02-17

## 2020-02-06 RX ORDER — 0.9 % SODIUM CHLORIDE 0.9 %
1000 INTRAVENOUS SOLUTION INTRAVENOUS ONCE
Status: COMPLETED | OUTPATIENT
Start: 2020-02-06 | End: 2020-02-06

## 2020-02-06 RX ORDER — RANOLAZINE 500 MG/1
500 TABLET, EXTENDED RELEASE ORAL 2 TIMES DAILY
Status: DISCONTINUED | OUTPATIENT
Start: 2020-02-06 | End: 2020-02-08 | Stop reason: HOSPADM

## 2020-02-06 RX ORDER — POTASSIUM CHLORIDE 20 MEQ/1
40 TABLET, EXTENDED RELEASE ORAL PRN
Status: DISCONTINUED | OUTPATIENT
Start: 2020-02-06 | End: 2020-02-08 | Stop reason: HOSPADM

## 2020-02-06 RX ORDER — LANOLIN ALCOHOL/MO/W.PET/CERES
1000 CREAM (GRAM) TOPICAL DAILY
Status: DISCONTINUED | OUTPATIENT
Start: 2020-02-06 | End: 2020-02-08 | Stop reason: HOSPADM

## 2020-02-06 RX ORDER — POLYETHYLENE GLYCOL 3350 17 G/17G
17 POWDER, FOR SOLUTION ORAL DAILY PRN
COMMUNITY

## 2020-02-06 RX ORDER — HEPARIN SODIUM 5000 [USP'U]/ML
5000 INJECTION, SOLUTION INTRAVENOUS; SUBCUTANEOUS EVERY 8 HOURS SCHEDULED
Status: DISCONTINUED | OUTPATIENT
Start: 2020-02-06 | End: 2020-02-08 | Stop reason: HOSPADM

## 2020-02-06 RX ORDER — POTASSIUM CHLORIDE 7.45 MG/ML
10 INJECTION INTRAVENOUS PRN
Status: DISCONTINUED | OUTPATIENT
Start: 2020-02-06 | End: 2020-02-08 | Stop reason: HOSPADM

## 2020-02-06 RX ORDER — POLYETHYLENE GLYCOL 3350 17 G/17G
17 POWDER, FOR SOLUTION ORAL DAILY PRN
Status: DISCONTINUED | OUTPATIENT
Start: 2020-02-06 | End: 2020-02-08 | Stop reason: HOSPADM

## 2020-02-06 RX ORDER — LEVOTHYROXINE SODIUM 0.1 MG/1
100 TABLET ORAL DAILY
Status: DISCONTINUED | OUTPATIENT
Start: 2020-02-06 | End: 2020-02-08 | Stop reason: HOSPADM

## 2020-02-06 RX ORDER — ASPIRIN 81 MG/1
81 TABLET ORAL DAILY
Status: DISCONTINUED | OUTPATIENT
Start: 2020-02-06 | End: 2020-02-08 | Stop reason: HOSPADM

## 2020-02-06 RX ORDER — MAGNESIUM SULFATE 1 G/100ML
1 INJECTION INTRAVENOUS PRN
Status: DISCONTINUED | OUTPATIENT
Start: 2020-02-06 | End: 2020-02-08 | Stop reason: HOSPADM

## 2020-02-06 RX ORDER — PRIMIDONE 50 MG/1
50 TABLET ORAL 2 TIMES DAILY
COMMUNITY

## 2020-02-06 RX ORDER — POLYETHYLENE GLYCOL 3350 17 G/17G
17 POWDER, FOR SOLUTION ORAL DAILY PRN
Status: DISCONTINUED | OUTPATIENT
Start: 2020-02-06 | End: 2020-02-06 | Stop reason: CLARIF

## 2020-02-06 RX ORDER — POTASSIUM CHLORIDE 20 MEQ/1
40 TABLET, EXTENDED RELEASE ORAL ONCE
Status: COMPLETED | OUTPATIENT
Start: 2020-02-06 | End: 2020-02-06

## 2020-02-06 RX ORDER — DOCUSATE SODIUM 100 MG/1
100 CAPSULE, LIQUID FILLED ORAL DAILY
Status: DISCONTINUED | OUTPATIENT
Start: 2020-02-06 | End: 2020-02-08 | Stop reason: HOSPADM

## 2020-02-06 RX ORDER — NITROGLYCERIN 0.4 MG/1
0.4 TABLET SUBLINGUAL
Status: DISCONTINUED | OUTPATIENT
Start: 2020-02-06 | End: 2020-02-08 | Stop reason: HOSPADM

## 2020-02-06 RX ORDER — SPIRONOLACTONE 25 MG/1
25 TABLET ORAL DAILY
Status: DISCONTINUED | OUTPATIENT
Start: 2020-02-06 | End: 2020-02-08 | Stop reason: HOSPADM

## 2020-02-06 RX ORDER — ACETAMINOPHEN 325 MG/1
650 TABLET ORAL EVERY 6 HOURS PRN
Status: DISCONTINUED | OUTPATIENT
Start: 2020-02-06 | End: 2020-02-08 | Stop reason: HOSPADM

## 2020-02-06 RX ORDER — CALORIC SUPPLEMENT
1 LIQUID (ML) ORAL 2 TIMES DAILY
Status: DISCONTINUED | OUTPATIENT
Start: 2020-02-06 | End: 2020-02-06 | Stop reason: CLARIF

## 2020-02-06 RX ORDER — ONDANSETRON 2 MG/ML
4 INJECTION INTRAMUSCULAR; INTRAVENOUS EVERY 6 HOURS PRN
Status: DISCONTINUED | OUTPATIENT
Start: 2020-02-06 | End: 2020-02-08 | Stop reason: HOSPADM

## 2020-02-06 RX ORDER — SODIUM CHLORIDE 9 MG/ML
INJECTION, SOLUTION INTRAVENOUS CONTINUOUS
Status: DISCONTINUED | OUTPATIENT
Start: 2020-02-06 | End: 2020-02-08

## 2020-02-06 RX ORDER — SERTRALINE HYDROCHLORIDE 100 MG/1
100 TABLET, FILM COATED ORAL NIGHTLY
Status: DISCONTINUED | OUTPATIENT
Start: 2020-02-06 | End: 2020-02-08 | Stop reason: HOSPADM

## 2020-02-06 RX ADMIN — HEPARIN SODIUM 5000 UNITS: 5000 INJECTION INTRAVENOUS; SUBCUTANEOUS at 20:07

## 2020-02-06 RX ADMIN — FAMOTIDINE 20 MG: 20 TABLET ORAL at 20:07

## 2020-02-06 RX ADMIN — SODIUM CHLORIDE 1000 ML: 9 INJECTION, SOLUTION INTRAVENOUS at 14:17

## 2020-02-06 RX ADMIN — PRIMIDONE 50 MG: 50 TABLET ORAL at 20:08

## 2020-02-06 RX ADMIN — SODIUM CHLORIDE: 9 INJECTION, SOLUTION INTRAVENOUS at 22:12

## 2020-02-06 RX ADMIN — BUPROPION HYDROCHLORIDE 100 MG: 100 TABLET, FILM COATED, EXTENDED RELEASE ORAL at 20:08

## 2020-02-06 RX ADMIN — METOPROLOL TARTRATE 12.5 MG: 25 TABLET ORAL at 20:07

## 2020-02-06 RX ADMIN — SERTRALINE HYDROCHLORIDE 100 MG: 100 TABLET ORAL at 20:08

## 2020-02-06 RX ADMIN — POTASSIUM CHLORIDE 40 MEQ: 1500 TABLET, EXTENDED RELEASE ORAL at 15:20

## 2020-02-06 RX ADMIN — RANOLAZINE 500 MG: 500 TABLET, FILM COATED, EXTENDED RELEASE ORAL at 20:07

## 2020-02-06 RX ADMIN — SODIUM CHLORIDE: 9 INJECTION, SOLUTION INTRAVENOUS at 17:54

## 2020-02-06 ASSESSMENT — ENCOUNTER SYMPTOMS
EYE DISCHARGE: 0
EYE REDNESS: 0
NAUSEA: 0
COUGH: 0
SHORTNESS OF BREATH: 0
WHEEZING: 0
SINUS PRESSURE: 0
RHINORRHEA: 0
CHEST TIGHTNESS: 0
CONSTIPATION: 1
CONSTIPATION: 0
DIARRHEA: 0
FACIAL SWELLING: 0
COLOR CHANGE: 0
EYE PAIN: 0
BACK PAIN: 0
ABDOMINAL PAIN: 0
SORE THROAT: 0
BLOOD IN STOOL: 0
VOMITING: 0
TROUBLE SWALLOWING: 0

## 2020-02-06 ASSESSMENT — PAIN SCALES - GENERAL
PAINLEVEL_OUTOF10: 0
PAINLEVEL_OUTOF10: 0

## 2020-02-06 NOTE — TELEPHONE ENCOUNTER
Pt dtr states pt is very tired and fatigue so she will like her to be admitted.  She will like to know if she needs to go to ED or will it be a direct admit please advise

## 2020-02-06 NOTE — PROGRESS NOTES
Medication History completed:    New medications: guaifenesin ER, glucosamine-MSM, polyethylene glycol    Medications discontinued: dextromethorphan-guaifenesin, Norco    Changes to dosing:   Metoprolol tartrate changed to 12.5 mg nightly  Primidone changed to 50 mg twice daily (patient is prescribed 3 times daily, but will only take twice daily)    Stated allergies: As listed    Other pertinent information: Medications confirmed with home medication list (created by [facility]).      Thank you,  Eddy Rogers, PharmD, BCPS  [phone]

## 2020-02-06 NOTE — DISCHARGE INSTR - COC
Continuity of Care Form    Patient Name: Denise Kaur   :  1927  MRN:  [id number]    Admit date:  2020  Discharge date:  2020    Code Status Order: Prior   Advance Directives:     Admitting Physician:  Malissa Tatum MD  PCP: Malissa Tatum MD    Discharging Nurse: [facility] Unit/Room#: 2042/2042-01  Discharging Unit Phone Number: [phone]    Emergency Contact:   Extended Emergency Contact Information  [facility] [address] Phone: [phone]  Mobile Phone: [phone]  Relation: Child  Secondary Emergency Contact: [address] [place] [address] [address]  Phone: [phone]  Relation: Child    Past Surgical History:  Past Surgical History:   Procedure Laterality Date    BLADDER SUSPENSION      x3    CARDIAC CATHETERIZATION  4-13    severe disease small d1 and PDA    CATARACT REMOVAL Bilateral     CHOLECYSTECTOMY      HYSTERECTOMY      ovaries removed at a later date    NOSE SURGERY      cancer removed    SALPINGO-OOPHORECTOMY      UPPER GASTROINTESTINAL ENDOSCOPY N/A 2018    EGD BIOPSY performed by Ashley Maria MD at [address]        Immunization History:   Immunization History   Administered Date(s) Administered    Influenza, High Dose (Fluzone 65 yrs and older) 2015, 10/11/2016, 10/23/2017, 2018    Influenza, Triv, inactivated, subunit, adjuvanted, IM (Fluad 65 yrs and older) 10/15/2019    Pneumococcal Conjugate 13-valent (Luci Romero) 2015    Pneumococcal Polysaccharide (Zoirryult16) 10/11/2016       Active Problems:  Patient Active Problem List   Diagnosis Code    Depression F32.9    Osteoarthritis M19.90    CKD (chronic kidney disease) stage 4, GFR 15-29 ml/min ([facility]) N18.4    Primary osteoarthritis involving multiple joints M15.0    GERD (gastroesophageal reflux disease) K21.9    History of total abdominal hysterectomy Z90.710    History of skin cancer A15.073  Sleep apnea G47.30    HTN (hypertension) I10    Acquired hypothyroidism E03.9    Unilateral inguinal hernia without obstruction or gangrene K40.90    Coronary artery disease involving native coronary artery of native heart with unstable angina pectoris ([facility]) I25.110    Chronic diastolic heart failure ([facility]) I50.32    Hordeolum externum of right upper eyelid H00.011    Esophageal dysphagia R13.10    Constipation K59.00    MI (myocardial infarction) ([facility]) I21.9    Hypothyroidism E03.9    COPD (chronic obstructive pulmonary disease) ([facility]) J44.9    Chronic kidney disease N18.9    CHF (congestive heart failure) ([facility]) I50.9    Leg wound, right, initial encounter S81.801A    Throat discomfort R07.0    CHF (congestive heart failure), NYHA class I, acute on chronic, combined ([facility]) I50.43    Congestive heart failure ([facility]) I50.9    Hyponatremia E87.1    MCI (mild cognitive impairment) G31.84    Diuretic-induced hypokalemia E87.6, T50.2X5A       Isolation/Infection:   Isolation          No Isolation        Patient Infection Status     None to display          Nurse Assessment:  Last Vital Signs: BP (!) 132/52   Pulse 73   Temp 98.3 °F (36.8 °C) (Oral)   Resp 15   Wt 189 lb (85.7 kg)   LMP  (LMP Unknown)   SpO2 97%   BMI 39.51 kg/m²     Last documented pain score (0-10 scale):    Last Weight:   Wt Readings from Last 1 Encounters:   02/06/20 189 lb (85.7 kg)     Mental Status:  oriented and alert    IV Access:  - None    Nursing Mobility/ADLs:  Walking   Assisted  Transfer  Assisted  Bathing  Assisted  Dressing  Assisted  Toileting  Assisted  Feeding  Independent  Med Admin  Independent  Med Delivery   whole    Wound Care Documentation and Therapy:  Wound 08/30/18 Wound # 1 Right pre tib, trauma,8-2018 (Active)   Number of days: 525        Elimination:  Continence:   · Bowel:  Yes  · Bladder: Yes  Urinary Catheter: None   Colostomy/Ileostomy/Ileal Conduit: No       Date of Last BM: 2/3/2020  No intake [phone]      / signature: Electronically signed by Ann-Marie Mccarthy RN on 2/6/20 at 5:02 PM    PHYSICIAN SECTION    Prognosis: Fair    Condition at Discharge: Stable    Rehab Potential (if transferring to Rehab): Fair    Recommended Labs or Other Treatments After Discharge: see above    Physician Certification: I certify the above information and transfer of Benny Boyd  is necessary for the continuing treatment of the diagnosis listed and that she requires Home Care for greater 30 days.      Update Admission H&P: No change in H&P    PHYSICIAN SIGNATURE:  Verbal order Dr. Natalee Christianson Electronically signed by Alejandro Guevara RN on 2/8/20 at 10:59 AM

## 2020-02-06 NOTE — ED NOTES
Mode of arrival (squad #, walk in, police, etc) : walk in        Chief complaint(s): abnormal labs/fatiogue        Arrival Note (brief scenario, treatment PTA, etc). : Family brought pt to ED after speaking with PCP in regards to fatigue and low NA/low Mg. Pt AOx4. RR easy, unlabored. C= \"Have you ever felt that you should Cut down on your drinking? \"  No  A= \"Have people Annoyed you by criticizing your drinking? \"  No  G= \"Have you ever felt bad or Guilty about your drinking? \"  No  E= \"Have you ever had a drink as an Eye-opener first thing in the morning to steady your nerves or to help a hangover? \"  No      Deferred []      Reason for deferring: N/A    *If yes to two or more: probable alcohol abuse. Derick Fontana RN  02/06/20 0444

## 2020-02-06 NOTE — FLOWSHEET NOTE
02/06/20 1843   Encounter Summary   Services provided to: Patient and family together   Referral/Consult From: [address] [place] [address] Road Visiting   (2-6-20)   Complexity of Encounter Moderate   Length of Encounter 15 minutes   Spiritual Assessment Completed Yes   Routine   Type Initial   Spiritual/Orthodoxy   Type Spiritual support   Assessment Calm; Approachable   Intervention Active listening;Explored feelings, thoughts, concerns;Prayer;Sustaining presence/ Ministry of presence; Discussed illness/injury and it's impact   Outcome Expressed gratitude;Engaged in conversation;Expressed feelings/needs/concerns;Coping;Receptive

## 2020-02-06 NOTE — ED NOTES
Report given to ANGELA Pagan from Med Surg. Report method by phone   The following was reviewed with receiving RN:   Current vital signs:  BP (!) 132/52   Pulse 73   Temp 98.3 °F (36.8 °C) (Oral)   Resp 15   Wt 189 lb (85.7 kg)   LMP  (LMP Unknown)   SpO2 97%   BMI 39.51 kg/m²                MEWS Score: 1     Any medication or safety alerts were reviewed. Any pending diagnostics and notifications were also reviewed, as well as any safety concerns or issues, abnormal labs, abnormal imaging, and abnormal assessment findings. Questions were answered.           Elliott Webb RN  02/06/20 9781

## 2020-02-06 NOTE — ED PROVIDER NOTES
[address] ED  eMERGENCY dEPARTMENT eNCOUnter      Pt Name: Natalia Velazquez  MRN: [id number]  [place] 9/29/1927  Date of evaluation: 2/6/20      CHIEF COMPLAINT       Chief Complaint   Patient presents with    Abnormal Lab     low sodium    Fatigue         HISTORY OF PRESENT ILLNESS    Natalia Velazquez is a 80 y.o. female who presents complaining of abnormal labs. Patient states that she has been fatigued with no energy at home for the last week or more. Patient got into the family doctor 2 days ago had some blood work done and was called stating that the sodium and potassium was low and come to the hospital.  Patient states that she does not have any chest pain shortness of breath or palpitations. Patient had no vomiting or diarrhea. Patient does do Bumex. Patient is just having no energy. Evidently patient was admitted a few months ago and was in the rehab for couple months and then came home about a month ago. REVIEW OF SYSTEMS       Review of Systems   Constitutional: Positive for fatigue. Negative for activity change, appetite change, chills, diaphoresis and fever. HENT: Negative for congestion, ear pain, facial swelling, nosebleeds, rhinorrhea, sinus pressure, sore throat and trouble swallowing. Eyes: Negative for pain, discharge and redness. Respiratory: Negative for cough, chest tightness, shortness of breath and wheezing. Cardiovascular: Negative for chest pain, palpitations and leg swelling. Gastrointestinal: Negative for abdominal pain, blood in stool, constipation, diarrhea, nausea and vomiting. Genitourinary: Negative for difficulty urinating, dysuria, flank pain, frequency, genital sores and hematuria. Musculoskeletal: Negative for arthralgias, back pain, gait problem, joint swelling, myalgias and neck pain. Skin: Negative for color change, pallor, rash and wound.    Neurological: Negative for dizziness, tremors, seizures, syncope, speech difficulty, weakness, (FLONASE) 50 MCG/ACT NASAL SPRAY    1 spray by Each Nare route daily 1 Spray in each nostril    HYDROCODONE-ACETAMINOPHEN (NORCO) 5-325 MG PER TABLET    Take 1 tablet by mouth every 6 hours as needed for Pain. LEVOTHYROXINE (SYNTHROID) 100 MCG TABLET    Take 1 tablet by mouth daily    LORATADINE (CLARITIN) 10 MG TABLET    Take by mouth daily     METOLAZONE (ZAROXOLYN) 2.5 MG TABLET    Take 1 tablet by mouth every 72 hours    METOPROLOL TARTRATE (LOPRESSOR) 25 MG TABLET    TAKE 1/2 TABLET BY MOUTH DAILY    MICONAZOLE (MICOTIN) 2 % POWDER    Apply topically 2 times daily. MULTIPLE VITAMINS-MINERALS (THERAPEUTIC MULTIVITAMIN-MINERALS) TABLET    Take 1 tablet by mouth daily. NITROGLYCERIN (NITROSTAT) 0.4 MG SL TABLET    up to max of 3 total doses. If no relief after 1 dose, call 911. NUTRITIONAL SUPPLEMENTS (ENSURE ORIGINAL) LIQD    Take 1 Bottle by mouth 2 times daily    POTASSIUM CHLORIDE (KLOR-CON M) 20 MEQ EXTENDED RELEASE TABLET    TAKE 1 TABLET BY MOUTH DAILY    PRIMIDONE (MYSOLINE) 50 MG TABLET    Take 1 tablet by mouth 3 times daily    RANOLAZINE (RANEXA) 500 MG EXTENDED RELEASE TABLET    Take 500 mg by mouth 2 times daily    SERTRALINE (ZOLOFT) 100 MG TABLET    Take 100 mg by mouth nightly    SPIRONOLACTONE (ALDACTONE) 25 MG TABLET    Take 1 tablet by mouth daily       ALLERGIES     is allergic to dye [iodides]. SOCIAL HISTORY      reports that she has quit smoking. She has never used smokeless tobacco. She reports that she does not drink alcohol or use drugs. PHYSICAL EXAM     INITIAL VITALS: BP (!) 145/65   Pulse 88   Temp 97.4 °F (36.3 °C) (Oral)   Resp 18   Wt 189 lb (85.7 kg)   LMP  (LMP Unknown)   SpO2 96%   BMI 39.51 kg/m²      Physical Exam  Vitals signs and nursing note reviewed. Constitutional:       General: She is not in acute distress. Appearance: She is well-developed. She is not diaphoretic. HENT:      Head: Normocephalic and atraumatic.    Eyes:      General: No (*)     All other components within normal limits   MAGNESIUM   URINE RT REFLEX TO CULTURE         MEDICAL DECISION MAKING:     I did review patient's labs and I will repeat them today give her some fluids and then will most likely need to be admitted for further management. EMERGENCY DEPARTMENT COURSE:   Vitals:    Vitals:    02/06/20 1347   BP: (!) 145/65   Pulse: 88   Resp: 18   Temp: 97.4 °F (36.3 °C)   TempSrc: Oral   SpO2: 96%   Weight: 189 lb (85.7 kg)       The patient was given the following medications while in the emergency department:  Orders Placed This Encounter   Medications    0.9 % sodium chloride bolus    potassium chloride (KLOR-CON M) extended release tablet 40 mEq       -------------------------  2:38 PM  Patient was reevaluated and updated on results. We will slowly rehydrate her and replace her sodium. Also give her oral supplement for the potassium. I spoke with Dr. Aaron Mccormick who does want the patient admitted and recommends residents. Residents have been notified. CONSULTS:  IP CONSULT TO PRIMARY CARE PROVIDER    PROCEDURES:  None    FINAL IMPRESSION      1. General weakness    2. Fatigue, unspecified type    3. Hyponatremia    4. Hypokalemia          DISPOSITION/PLAN   DISPOSITION Decision To Admit 02/06/2020 02:30:55 PM      PATIENT REFERREDTO:  No follow-up provider specified.     DISCHARGEMEDICATIONS:  New Prescriptions    No medications on file       (Please note that portions of this note were completed with a voice recognition program.  Efforts were made to edit thedictations but occasionally words are mis-transcribed.)    Alda Contreras MD  Attending Emergency Physician                        Alda Contreras MD  02/06/20 9923

## 2020-02-06 NOTE — CARE COORDINATION
CASE MANAGEMENT NOTE:    Admission Date:  2/6/2020 Freddy Hayes is a 80 y.o.  female    Admitted for : Hyponatremia [E87.1]    Met with:  Patient and daughters     PCP:  Ad Hansen                                 Insurance:  [employer] Code       Current Residence/ Living Arrangements:  at home dependent by self on family care              Current Services PTA:  Yes, current with [facility] with [facility]. Also works with [facility] office on aging Current with [address]     Is patient agreeable to VNS: Yes     Freedom of choice provided: Yes     List of [address] provided: NA     VNS chosen: Yes     DME:  walker, shower chair, scooter and grab bars       Home Oxygen: Yes at night  3Lnc     Nebulizer: No     CPAP/BIPAP: No     Supplier: VANGIE Kaur     Potential Assistance Needed: No     SNF needed: no     Pharmacy:  [facility]        Is the Patient an [facility]ARDOS[facility] with Readmission Risk Score greater than 14%? unknown readmission risk  If yes, pt needs a follow up appointment made within 7 days.     Does Patient want to use MEDS to BEDS? No     Family Members/Caregivers that pt would like involved in their care: Yes     If yes, list name here:  Daughter Jovi Copas     Transportation Provider:  Family              Is patient in Isolation/One on One/Altered Mental Status? No  If yes, skip next question. If no, would they like an I-Pad to  use? No  If yes, call [phone].              Discharge Plan:  2/6/2020 - Mandeep Alvarado - Patient is from [facility] in same complex as daughter. Has a walker, Shower bench, Grab bars and scooter, uses Oxygen 3l/nc at HS thru [facility]. [facility]. VNS is from [facility]. Follows with [facility] office on aging.  [place]enn[place] header LUBNA needs signed and completed, will continue to follow .//rm                  Electronically signed by: Gabi Tabares RN on 2/6/2020 at 4:49 PM

## 2020-02-06 NOTE — H&P
[facility]     HISTORY AND PHYSICAL EXAMINATION            Date:   2/6/2020  Patient name:  [place]  Date of admission:  2/6/2020  1:40 PM  MRN:   [id number]  Account:  [de-identified]  YOB: 1927  PCP:    Lan Graff MD  Room:   05/05  Code Status:    Prior    Chief Complaint:     Chief Complaint   Patient presents with    Abnormal Lab     low sodium    Fatigue       History Obtained From:     Patient, two daughters    History of Present Illness: The patient is a 80 y.o. Non-/non  female who presents withAbnormal Lab (low sodium) and Fatigue   and she is admitted to the hospital for the management of  Hyponatremia and hypokalemia. She is a 44-year-old female with a past medical history of COPD, CHF, CKD, CAD who presents today with fatigue. And was recently released from rehab 3 weeks ago. And was seen at work in clinic on February 4 and was found to have sodium 128 and creatinine level 1.41. Patient was called and told to go to the emergency department. Emergency department patient's sodium today is 131 and her potassium is 0.3. Creatinine level has increased to 1.52. Complains of fatigue for the past 1 week. Denies any headaches, chest pain, trouble breathing, fever chills, abdominal pain, or N/V. Patient states she has chronic bilateral lower extremity swelling. She has no other concerns other than feeling fatigued today.        Past Medical History:     Past Medical History:   Diagnosis Date    Bipolar disorder ([address]     CHF (congestive heart failure) (HCC)     Chronic kidney disease     COPD (chronic obstructive pulmonary disease) ([address]     Coronary artery disease involving native coronary artery of native heart with unstable angina pectoris ([address] 6/23/2017    Depression     Esophageal dysphagia     GERD (gastroesophageal reflux disease)     History of skin cancer     nose x3    HTN (hypertension) 4/13/2016    Hypothyroidism     [facility] infarction) [facility] 2016    Osteoarthritis     Sleep apnea     Does not use machine        Past SurgicalHistory:     Past Surgical History:   Procedure Laterality Date    BLADDER SUSPENSION      x3    CARDIAC CATHETERIZATION  4-13    severe disease small d1 and PDA    CATARACT REMOVAL Bilateral     CHOLECYSTECTOMY      HYSTERECTOMY      ovaries removed at a later date    NOSE SURGERY      cancer removed    SALPINGO-OOPHORECTOMY      UPPER GASTROINTESTINAL ENDOSCOPY N/A 9/4/2018    EGD BIOPSY performed by [place] Scott MD at [address]        Medications Prior to Admission:        Prior to Admission medications    Medication Sig Start Date End Date Taking? Authorizing Provider   primidone (MYSOLINE) 50 MG tablet Take 50 mg by mouth 2 times daily   Yes Historical Provider, MD   metoprolol tartrate (LOPRESSOR) 25 MG tablet Take 12.5 mg by mouth nightly   Yes Historical Provider, MD   Glucosamine HCl-MSM 1500-500 MG/30ML LIQD Take 30 mLs by mouth daily   Yes Historical Provider, MD   guaiFENesin (MUCINEX) 600 MG extended release tablet Take 1,200 mg by mouth 2 times daily as needed for Congestion   Yes Historical Provider, MD   polyethylene glycol (GLYCOLAX) powder Take 17 g by mouth daily as needed (constipation)   Yes Historical Provider, MD   bumetanide (BUMEX) 2 MG tablet Take 1 tablet by mouth 2 times daily 2/4/20  Yes Elayne Mcknight PA-C   acetaminophen (TYLENOL) 325 MG tablet Take 650 mg by mouth every 6 hours as needed for Pain   Yes Historical Provider, MD   miconazole (MICOTIN) 2 % powder Apply topically 2 times daily.  1/16/20  Yes Chetna Severino MD   levothyroxine (SYNTHROID) 100 MCG tablet Take 1 tablet by mouth daily 1/16/20  Yes Chetna Severino MD   buPROPion [facility] SR) 100 MG extended release tablet Take 1 tablet by mouth 2 times daily 1/16/20  Yes Roby Gonzalez MD   nitroGLYCERIN (NITROSTAT) 0.4 MG SL tablet up to max of 3 total doses. If no relief after 1 dose, call 911. 1/16/20  Yes Roby Gonzalez MD   Nutritional Supplements (ENSURE ORIGINAL) LIQD Take 1 Bottle by mouth 2 times daily 1/16/20  Yes Roby Gonzalez MD   potassium chloride (KLOR-CON M) 20 MEQ extended release tablet TAKE 1 TABLET BY MOUTH DAILY 12/26/19  Yes Sam Mariee MD   metOLazone (ZAROXOLYN) 2.5 MG tablet Take 1 tablet by mouth every 72 hours 11/25/19  Yes Sam Mariee MD   spironolactone (ALDACTONE) 25 MG tablet Take 1 tablet by mouth daily 11/23/19  Yes Sam Mariee MD   famotidine (PEPCID) 20 MG tablet TAKE 1 TABLET BY MOUTH 2 TIMES DAILY 10/10/19  Yes Sam Mariee MD   loratadine (CLARITIN) 10 MG tablet Take 10 mg by mouth daily  7/17/19  Yes Historical Provider, MD   sertraline (ZOLOFT) 100 MG tablet Take 100 mg by mouth nightly   Yes Historical Provider, MD   cyanocobalamin 1000 MCG tablet Take 1,000 mcg by mouth daily   Yes Historical Provider, MD   fluticasone (FLONASE) 50 MCG/ACT nasal spray 1 spray by Each Nare route daily 1 Spray in each nostril 1/25/19  Yes Viviane Castro MD   ranolazine (RANEXA) 500 MG extended release tablet Take 500 mg by mouth 2 times daily   Yes Historical Provider, MD   docusate sodium (COLACE, DULCOLAX) 100 MG CAPS Take 100 mg by mouth daily 4/19/16  Yes ANA Martinez - CNP   aspirin 81 MG EC tablet Take 81 mg by mouth daily    Yes Historical Provider, MD   Multiple Vitamins-Minerals (THERAPEUTIC MULTIVITAMIN-MINERALS) tablet Take 1 tablet by mouth daily. Yes Historical Provider, MD        Allergies:     Dye [iodides]    Social History:     Tobacco:    reports that she has quit smoking. She has never used smokeless tobacco.  Alcohol:      reports no history of alcohol use. Drug Use:  reports no history of drug use.     Family History:     Family History   Problem Relation Age of Onset    Heart Disease Mother     Cancer Father     Stroke Sister     Heart Disease Sister        Review of Systems:     Positive and Negative as described in HPI. Review of Systems   Constitutional: Positive for fatigue. Negative for activity change, appetite change and fever. HENT: Negative for trouble swallowing. Respiratory: Negative for cough, chest tightness, shortness of breath and wheezing. Cardiovascular: Positive for leg swelling. Negative for chest pain. Gastrointestinal: Positive for constipation. Negative for abdominal pain, diarrhea, nausea and vomiting. Genitourinary: Positive for difficulty urinating and dysuria. Negative for flank pain, frequency, pelvic pain and urgency. Musculoskeletal: Positive for myalgias. Neurological: Positive for weakness. Negative for dizziness and headaches. Psychiatric/Behavioral: Negative for agitation. Physical Exam:   BP (!) 145/65   Pulse 88   Temp 97.4 °F (36.3 °C) (Oral)   Resp 18   Wt 189 lb (85.7 kg)   LMP  (LMP Unknown)   SpO2 96%   BMI 39.51 kg/m²   Temp (24hrs), Av.4 °F (36.3 °C), Min:97.4 °F (36.3 °C), Max:97.4 °F (36.3 °C)    No results for input(s): POCGLU in the last 72 hours. No intake or output data in the 24 hours ending 20 1513    Physical Exam  Vitals signs reviewed. Constitutional:       General: She is not in acute distress. Appearance: She is well-developed. She is not diaphoretic. HENT:      Head: Normocephalic and atraumatic. Neck:      Musculoskeletal: Normal range of motion and neck supple. Cardiovascular:      Rate and Rhythm: Normal rate and regular rhythm. Heart sounds: Normal heart sounds. Pulmonary:      Effort: Pulmonary effort is normal. No respiratory distress. Breath sounds: Normal breath sounds. No stridor. No wheezing. Abdominal:      General: Bowel sounds are normal. There is no distension. Palpations: Abdomen is soft. Tenderness:  There is no abdominal outlined, requirement for current medical therapies and most importantly because of direct risk to patient if care not provided in a hospital setting.     Cherelle Valenzuela MD  2/6/2020  3:13 PM    Copy sent to Dr. Jailyn Martínez MD

## 2020-02-07 LAB
ABSOLUTE EOS #: 0.5 K/UL (ref 0–0.4)
ABSOLUTE IMMATURE GRANULOCYTE: ABNORMAL K/UL (ref 0–0.3)
ABSOLUTE LYMPH #: 1.2 K/UL (ref 1–4.8)
ABSOLUTE MONO #: 0.5 K/UL (ref 0.1–1.3)
ANION GAP SERPL CALCULATED.3IONS-SCNC: 11 MMOL/L (ref 9–17)
BASOPHILS # BLD: 1 % (ref 0–2)
BASOPHILS ABSOLUTE: 0.1 K/UL (ref 0–0.2)
BUN BLDV-MCNC: 24 MG/DL (ref 8–23)
BUN/CREAT BLD: ABNORMAL (ref 9–20)
CALCIUM SERPL-MCNC: 8.3 MG/DL (ref 8.6–10.4)
CHLORIDE BLD-SCNC: 96 MMOL/L (ref 98–107)
CO2: 24 MMOL/L (ref 20–31)
CREAT SERPL-MCNC: 1.54 MG/DL (ref 0.5–0.9)
DIFFERENTIAL TYPE: ABNORMAL
EOSINOPHILS RELATIVE PERCENT: 10 % (ref 0–4)
GFR AFRICAN AMERICAN: 38 ML/MIN
GFR NON-AFRICAN AMERICAN: 32 ML/MIN
GFR SERPL CREATININE-BSD FRML MDRD: ABNORMAL ML/MIN/{1.73_M2}
GFR SERPL CREATININE-BSD FRML MDRD: ABNORMAL ML/MIN/{1.73_M2}
GLUCOSE BLD-MCNC: 103 MG/DL (ref 70–99)
HCT VFR BLD CALC: 33.7 % (ref 36–46)
HEMOGLOBIN: 11.2 G/DL (ref 12–16)
IMMATURE GRANULOCYTES: ABNORMAL %
LYMPHOCYTES # BLD: 26 % (ref 24–44)
MCH RBC QN AUTO: 31.2 PG (ref 26–34)
MCHC RBC AUTO-ENTMCNC: 33.2 G/DL (ref 31–37)
MCV RBC AUTO: 94.1 FL (ref 80–100)
MONOCYTES # BLD: 11 % (ref 1–7)
NRBC AUTOMATED: ABNORMAL PER 100 WBC
PDW BLD-RTO: 15.1 % (ref 11.5–14.9)
PLATELET # BLD: 252 K/UL (ref 150–450)
PLATELET ESTIMATE: ABNORMAL
PMV BLD AUTO: 7.9 FL (ref 6–12)
POTASSIUM SERPL-SCNC: 3.8 MMOL/L (ref 3.7–5.3)
RBC # BLD: 3.58 M/UL (ref 4–5.2)
RBC # BLD: ABNORMAL 10*6/UL
SEG NEUTROPHILS: 52 % (ref 36–66)
SEGMENTED NEUTROPHILS ABSOLUTE COUNT: 2.4 K/UL (ref 1.3–9.1)
SODIUM BLD-SCNC: 131 MMOL/L (ref 135–144)
WBC # BLD: 4.6 K/UL (ref 3.5–11)
WBC # BLD: ABNORMAL 10*3/UL

## 2020-02-07 PROCEDURE — 6360000002 HC RX W HCPCS: Performed by: STUDENT IN AN ORGANIZED HEALTH CARE EDUCATION/TRAINING PROGRAM

## 2020-02-07 PROCEDURE — 85025 COMPLETE CBC W/AUTO DIFF WBC: CPT

## 2020-02-07 PROCEDURE — 1200000000 HC SEMI PRIVATE

## 2020-02-07 PROCEDURE — 97530 THERAPEUTIC ACTIVITIES: CPT

## 2020-02-07 PROCEDURE — 99232 SBSQ HOSP IP/OBS MODERATE 35: CPT | Performed by: INTERNAL MEDICINE

## 2020-02-07 PROCEDURE — 6370000000 HC RX 637 (ALT 250 FOR IP): Performed by: INTERNAL MEDICINE

## 2020-02-07 PROCEDURE — 6370000000 HC RX 637 (ALT 250 FOR IP): Performed by: STUDENT IN AN ORGANIZED HEALTH CARE EDUCATION/TRAINING PROGRAM

## 2020-02-07 PROCEDURE — 97535 SELF CARE MNGMENT TRAINING: CPT

## 2020-02-07 PROCEDURE — 2580000003 HC RX 258: Performed by: STUDENT IN AN ORGANIZED HEALTH CARE EDUCATION/TRAINING PROGRAM

## 2020-02-07 PROCEDURE — 97165 OT EVAL LOW COMPLEX 30 MIN: CPT

## 2020-02-07 PROCEDURE — 36415 COLL VENOUS BLD VENIPUNCTURE: CPT

## 2020-02-07 PROCEDURE — 97162 PT EVAL MOD COMPLEX 30 MIN: CPT

## 2020-02-07 PROCEDURE — 80048 BASIC METABOLIC PNL TOTAL CA: CPT

## 2020-02-07 RX ORDER — TORSEMIDE 20 MG/1
20 TABLET ORAL DAILY
Status: DISCONTINUED | OUTPATIENT
Start: 2020-02-07 | End: 2020-02-08 | Stop reason: HOSPADM

## 2020-02-07 RX ORDER — HYDROCODONE BITARTRATE AND ACETAMINOPHEN 5; 325 MG/1; MG/1
1 TABLET ORAL EVERY 4 HOURS PRN
Status: DISCONTINUED | OUTPATIENT
Start: 2020-02-07 | End: 2020-02-08 | Stop reason: HOSPADM

## 2020-02-07 RX ADMIN — HEPARIN SODIUM 5000 UNITS: 5000 INJECTION INTRAVENOUS; SUBCUTANEOUS at 14:52

## 2020-02-07 RX ADMIN — HYDROCODONE BITARTRATE AND ACETAMINOPHEN 1 TABLET: 5; 325 TABLET ORAL at 05:32

## 2020-02-07 RX ADMIN — DOCUSATE SODIUM 100 MG: 100 CAPSULE, LIQUID FILLED ORAL at 08:35

## 2020-02-07 RX ADMIN — RANOLAZINE 500 MG: 500 TABLET, FILM COATED, EXTENDED RELEASE ORAL at 08:36

## 2020-02-07 RX ADMIN — PRIMIDONE 50 MG: 50 TABLET ORAL at 08:36

## 2020-02-07 RX ADMIN — SODIUM CHLORIDE: 9 INJECTION, SOLUTION INTRAVENOUS at 05:38

## 2020-02-07 RX ADMIN — RANOLAZINE 500 MG: 500 TABLET, FILM COATED, EXTENDED RELEASE ORAL at 20:30

## 2020-02-07 RX ADMIN — POTASSIUM CHLORIDE 20 MEQ: 1500 TABLET, EXTENDED RELEASE ORAL at 08:35

## 2020-02-07 RX ADMIN — METOPROLOL TARTRATE 12.5 MG: 25 TABLET ORAL at 20:30

## 2020-02-07 RX ADMIN — SPIRONOLACTONE 25 MG: 25 TABLET, FILM COATED ORAL at 08:36

## 2020-02-07 RX ADMIN — ACETAMINOPHEN 650 MG: 325 TABLET, FILM COATED ORAL at 15:14

## 2020-02-07 RX ADMIN — CYANOCOBALAMIN TAB 1000 MCG 1000 MCG: 1000 TAB at 08:36

## 2020-02-07 RX ADMIN — TORSEMIDE 20 MG: 20 TABLET ORAL at 15:03

## 2020-02-07 RX ADMIN — HEPARIN SODIUM 5000 UNITS: 5000 INJECTION INTRAVENOUS; SUBCUTANEOUS at 20:31

## 2020-02-07 RX ADMIN — LEVOTHYROXINE SODIUM 100 MCG: 100 TABLET ORAL at 08:35

## 2020-02-07 RX ADMIN — HEPARIN SODIUM 5000 UNITS: 5000 INJECTION INTRAVENOUS; SUBCUTANEOUS at 05:32

## 2020-02-07 RX ADMIN — FAMOTIDINE 20 MG: 20 TABLET ORAL at 20:30

## 2020-02-07 RX ADMIN — SERTRALINE HYDROCHLORIDE 100 MG: 100 TABLET ORAL at 20:30

## 2020-02-07 RX ADMIN — FAMOTIDINE 20 MG: 20 TABLET ORAL at 08:36

## 2020-02-07 RX ADMIN — ACETAMINOPHEN 650 MG: 325 TABLET, FILM COATED ORAL at 02:11

## 2020-02-07 RX ADMIN — PRIMIDONE 50 MG: 50 TABLET ORAL at 20:37

## 2020-02-07 RX ADMIN — BUPROPION HYDROCHLORIDE 100 MG: 100 TABLET, FILM COATED, EXTENDED RELEASE ORAL at 08:37

## 2020-02-07 RX ADMIN — ASPIRIN 81 MG: 81 TABLET, COATED ORAL at 08:35

## 2020-02-07 RX ADMIN — BUPROPION HYDROCHLORIDE 100 MG: 100 TABLET, FILM COATED, EXTENDED RELEASE ORAL at 20:30

## 2020-02-07 ASSESSMENT — ENCOUNTER SYMPTOMS
NAUSEA: 0
COUGH: 0
TROUBLE SWALLOWING: 0
STRIDOR: 0
BACK PAIN: 1
DIARRHEA: 0
CHEST TIGHTNESS: 0
ABDOMINAL PAIN: 0
CONSTIPATION: 1
SHORTNESS OF BREATH: 0
ABDOMINAL DISTENTION: 0
WHEEZING: 0
VOMITING: 0

## 2020-02-07 ASSESSMENT — PAIN SCALES - GENERAL
PAINLEVEL_OUTOF10: 5
PAINLEVEL_OUTOF10: 3
PAINLEVEL_OUTOF10: 0
PAINLEVEL_OUTOF10: 3
PAINLEVEL_OUTOF10: 5
PAINLEVEL_OUTOF10: 2

## 2020-02-07 ASSESSMENT — PAIN DESCRIPTION - LOCATION
LOCATION: BACK
LOCATION: BACK
LOCATION: LEG;BACK
LOCATION: LEG;BACK

## 2020-02-07 ASSESSMENT — PAIN DESCRIPTION - PAIN TYPE
TYPE: CHRONIC PAIN

## 2020-02-07 ASSESSMENT — PAIN DESCRIPTION - ORIENTATION
ORIENTATION: RIGHT;LEFT
ORIENTATION: RIGHT;LEFT

## 2020-02-07 NOTE — PROGRESS NOTES
2/7/2020 0538  Gross per 24 hour   Intake 1592 ml   Output --   Net 1592 ml       Labs:    CBC:   Lab Results   Component Value Date    WBC 4.6 02/07/2020    RBC 3.58 02/07/2020    RBC 4.24 06/03/2012    HGB 11.2 02/07/2020    HCT 33.7 02/07/2020    MCV 94.1 02/07/2020    MCH 31.2 02/07/2020    MCHC 33.2 02/07/2020    RDW 15.1 02/07/2020     02/07/2020     06/03/2012    MPV 7.9 02/07/2020     BMP:    Lab Results   Component Value Date     02/07/2020    K 3.8 02/07/2020    CL 96 02/07/2020    CO2 24 02/07/2020    BUN 24 02/07/2020    LABALBU 4.0 11/18/2019    CREATININE 1.54 02/07/2020    CALCIUM 8.3 02/07/2020    GFRAA 38 02/07/2020    LABGLOM 32 02/07/2020    GLUCOSE 103 02/07/2020    GLUCOSE 94 06/03/2012       Radiology:    No results found. Physical Examination:        Physical Exam  Constitutional:       General: She is not in acute distress. Appearance: Normal appearance. She is obese. She is not diaphoretic. HENT:      Head: Normocephalic and atraumatic. Mouth/Throat:      Mouth: Mucous membranes are moist.   Eyes:      Pupils: Pupils are equal, round, and reactive to light. Neck:      Musculoskeletal: Normal range of motion and neck supple. Thyroid: No thyromegaly. Vascular: No JVD. Cardiovascular:      Rate and Rhythm: Normal rate and regular rhythm. Heart sounds: Normal heart sounds. Comments: No murmur, rub, or gallops appreciated  Pulmonary:      Effort: Pulmonary effort is normal. No respiratory distress. Breath sounds: Normal breath sounds. No wheezing or rales. Chest:      Chest wall: No tenderness. Abdominal:      General: Bowel sounds are normal. There is no distension. Palpations: Abdomen is soft. Tenderness: There is no abdominal tenderness. Musculoskeletal:      Comments: Moves all extremities, trace edema at ankles   Skin:     General: Skin is warm and dry. Neurological:      General: No focal deficit present. Mental Status: She is alert and oriented to person, place, and time. Mental status is at baseline. Cranial Nerves: No cranial nerve deficit. Sensory: No sensory deficit. Psychiatric:         Mood and Affect: Mood normal.         Behavior: Behavior normal.           Assessment:        Primary Problem  Hyponatremia    Active Hospital Problems    Diagnosis Date Noted    Diuretic-induced hypokalemia [E87.6, T50.2X5A]     Hyponatremia [E87.1] 11/18/2019    Acquired hypothyroidism [E03.9] 10/11/2016    HTN (hypertension) [I10] 04/13/2016    Depression [F32.9]        Plan:        Hyponatremia likely 2/2 volume overload  -pt asymtomatic  -Na-131 continue to check BMP q12hrs  -NS 75 ml/hr  -urine labs pending     Diuretic-induced hypokalemia-resolved  -K replacement  - K 3.8     CKD  -continue home meds, will change home bumex to torsemide for better bioavailablity  -Cr 1.54, baseline 1.2 to 1.7    Chronic combined heart failure   -Continue home meds    Hypothyroidism  -Continue home medication     Depression  -Stable continue home medication    Chronic constipation  - Continue home meds    Chronic pain  - PRN Norco        Diet:renal  Dispo: pt would like to go home, pt daughter takes care of her  PT/OT  DVT prophylaxis: Heparin     Tara Porras MD  2/7/2020  8:48 AM     Attending Physician Statement    I have discussed the case of Gisela Gentile, including pertinent history and exam findings with the resident. I have seen and examined the patient and the key elements of the encounter have been performed by me. I agree with the assessment, plan, and orders as documented by the resident. Is my impression that she has worsening of her heart failure which is systolic and diastolic combined. Her hyponatremia is most likely due to volume expansion.   She can be discharged tomorrow  Electronically signed by Lucie Coyle MD on 2/7/2020 at 2:13 PM

## 2020-02-07 NOTE — PROGRESS NOTES
[facility]  Comment: grossly 4/5  Strength LUE  Strength LUE: [facility]  Comment: grossly 4/5     Sensation  Overall Sensation Status: WFL  Bed mobility  Scooting: Moderate assistance  Transfers  Sit to Stand: Moderate Assistance  Stand to sit: Moderate Assistance  Ambulation  Ambulation?: Yes  Ambulation 1  Surface: level tile  Device: Rolling Walker  Assistance: Moderate assistance  Quality of Gait: decreased taqueria, wide base of support, decreased step length  Distance: 20 ft     Balance  Posture: Fair  Sitting - Static: Fair  Sitting - Dynamic: Fair  Standing - Static: Fair  Standing - Dynamic: [address]  Times per week: 5-7  Times per day: Daily  Plan weeks: 2 weeks  Specific instructions for Next Treatment: Ther exs and Ther Activity as tolerated  Current Treatment Recommendations: Strengthening, Balance Training, Functional Mobility Training, Transfer Training, Gait Training  Safety Devices  Type of devices: Call light within reach, Left in chair, Nurse notified, Gait belt                        AM-PAC Score     AM-PAC Inpatient Mobility without Stair Climbing Raw Score : 12 (02/07/20 1221)  AM-PAC Inpatient without Stair Climbing T-Scale Score : 37.26 (02/07/20 1221)  Mobility Inpatient CMS 0-100% Score: 63.03 (02/07/20 1221)  Mobility Inpatient without Stair CMS G-Code Modifier : CL (02/07/20 1221)       Goals  Short term goals  Time Frame for Short term goals: 5 sessions  Short term goal 1: Improve strength in b LE to 4+/5  Short term goal 2: Independent in bed mobility  Short term goal 3:  Independent with sit to stand transfers  Short term goal 4: Improve standing balance to fair+  Long term goals  Time Frame for Long term goals : 10 sessions  Long term goal 1: Pt will ambulate independently 150 ft with RW  Patient Goals   Patient goals : Go Home       Therapy Time   Individual Concurrent Group Co-treatment   Time In 1010         Time Out [address], PT

## 2020-02-07 NOTE — PROGRESS NOTES
Patient complaining of chronic back pain. Tylenol was given at 0200. Dr [place] notified of this and Norco 1 every 4 hours as needed ordered.

## 2020-02-07 NOTE — PROGRESS NOTES
Apartment  Home Layout: (1st floor)  Home Access: Level entry  Bathroom Shower/Tub: Walk-in shower  Bathroom Toilet: Standard(arm rest on side)  Bathroom Equipment: Shower chair, Grab bars in shower, Grab bars around toilet  Bathroom Accessibility: Walker accessible  Home Equipment: 4 wheeled walker, Electric scooter, Alert [employer] Help From: Family, Home health(HHA x 2 /week)  ADL Assistance: Needs assistance(sup for showers only, notes indep tie shoes seated on low toilet.  )  Homemaking Assistance: Needs assistance  Meal Prep: (pt unable any housekkeping by herself and her dtrs and HHA )  Laundry: (do everything for her)  Ambulation Assistance: Independent(with 4 wheeled walker)  Transfer Assistance: Independent  Occupation: Retired  IADL Comments: pt just released from SNF 3 weeks ago. pt dtr who lives in [facility] has baby monitor to hear pt in her room. they have a system, pt rings a bell when she gets up.          Objective      Cognition  Overall Cognitive Status: [facility]  Cognition Comment: pt demo good memory, problem solving and safety judgement today       ADL  Feeding: Independent  Grooming: Setup  UE Bathing: Setup  LE Bathing: Setup, Contact guard assistance  UE Dressing: Setup  LE Dressing: Setup, Contact guard assistance  Toileting: Contact guard assistance  Additional Comments: pt reaches down to demo tie shoes, can only reach to top of ankle in room chair (higher than pt's toilet at home where she completes tie shoes)      UE Function           LUE Strength  Gross LUE Strength: Exceptions to [facility] [place]  L Shoulder Flex: 3+/5           LUE AROM (degrees)  LUE AROM : WFL  LUE General AROM: shld, 0-100 A, 0-120 P        RUE Strength  Gross RUE Strength: Exceptions to [facility] [place]  R Shoulder Flex: 3+/5            RUE AROM (degrees)  RUE AROM : WFL  RUE General AROM: shld, 0-100 A, 0-120 P          Fine Motor Skills  Coordination  Movements Are Fluid And Coordinated: Yes   Comment: fair, pt missing digits 2 + 3 past PIP R hand, pt with guard to prevent finger flex for trigger finger digit 2 on L hand, pt notes sx for dupytene's contacture released digit 4 LUE well               Quality of Movement Other  Comment: fair, pt missing digits 2 + 3 past PIP R hand, pt with guard to prevent finger flex for trigger finger digit 2 on L hand, pt notes sx for dupytene's contacture released digit 4 LUE well       Mobility          Balance  Standing Balance: Contact guard assistance  Standing Balance  Time: 4 min  Activity: amb forward and back 4 feet with RW     Bed mobility  Comment: pt up in chair at begin and end tx     Transfers  Stand Step Transfers: Contact guard assistance(RW)  Sit to stand: Contact guard assistance  Stand to sit: Contact guard assistance  Transfer Comments: extra tlme needed      Assessment  Performance deficits / Impairments: Decreased functional mobility , Decreased ADL status, Decreased endurance, Decreased strength, Decreased ROM  Prognosis: Good  Decision Making: Low Complexity  History:  Hyponatremia and hypokalemia.    Exam: 4 performance deficits  Assistance / Modification: low  REQUIRES OT FOLLOW UP: Yes  Activity Tolerance: Patient Tolerated treatment well, Patient limited by fatigue                   Goals  Patient Goals   Patient goals : return home indep and safe as prior  Short term goals  Time Frame for Short term goals: 1 week  Short term goal 1: SBA amb with 4 WW to obtain set up for adls with good safety and balance  Short term goal 2: SBA self care and adl transfers  Short term goal 3: omar 10 reps x 2 AAROM B shlds for improved AROM  Short term goal 4: omar 10 reps x 2 AROM B shlds with min resistance for strenghtening  Short term goal 5: omar 6-8 min stand, amb for adls with 4 ww and SBA with good balance and safety    Plan        Plan  Times per week: 4-6  Times per day: Daily  Current Treatment Recommendations: Strengthening, Endurance Training, Patient/Caregiver Education & Training, ROM, Self-Care / ADL, Equipment Evaluation, Education, & procurement, Balance Training, Functional Mobility Training, Safety Education & Training  OT Education  OT Education: OT Role, Plan of Care, IADL Safety, ADL Adaptive Strategies  Patient Education: need for CGA for all amb , stand upon d/c, dtrs understand and can provide.    do not stand alone here, pt notes understanding    OT Equipment Recommendations  Equipment Needed: No  OT Individual Minutes  Time In: 1302  Time Out: 7358  Minutes: 41    Electronically signed by Yulisa Lino OT on 2/7/20 at 6:21 PM

## 2020-02-07 NOTE — PLAN OF CARE
Problem: Risk for Impaired Skin Integrity  Goal: Tissue integrity - skin and mucous membranes  Description  Structural intactness and normal physiological function of skin and  mucous membranes. 2/7/2020 1625 by Sabine Salgado RN  Outcome: Ongoing   Pt. Shows no new signs of skin down, skin assessment completed. Waffle mattress in place. Problem: Falls - Risk of:  Goal: Will remain free from falls  Description  Will remain free from falls  2/7/2020 1625 by Sabine Salgado RN  Outcome: Ongoing   Pt. Remains free of falls, appropriate fall precautions in place.

## 2020-02-08 VITALS
HEART RATE: 81 BPM | SYSTOLIC BLOOD PRESSURE: 144 MMHG | OXYGEN SATURATION: 96 % | WEIGHT: 187.39 LBS | TEMPERATURE: 98.8 F | BODY MASS INDEX: 42.15 KG/M2 | DIASTOLIC BLOOD PRESSURE: 56 MMHG | RESPIRATION RATE: 16 BRPM | HEIGHT: 56 IN

## 2020-02-08 LAB
ABSOLUTE EOS #: 0.4 K/UL (ref 0–0.4)
ABSOLUTE IMMATURE GRANULOCYTE: ABNORMAL K/UL (ref 0–0.3)
ABSOLUTE LYMPH #: 1 K/UL (ref 1–4.8)
ABSOLUTE MONO #: 0.6 K/UL (ref 0.1–1.3)
ANION GAP SERPL CALCULATED.3IONS-SCNC: 13 MMOL/L (ref 9–17)
BASOPHILS # BLD: 1 % (ref 0–2)
BASOPHILS ABSOLUTE: 0.1 K/UL (ref 0–0.2)
BUN BLDV-MCNC: 19 MG/DL (ref 8–23)
BUN/CREAT BLD: ABNORMAL (ref 9–20)
CALCIUM SERPL-MCNC: 8.5 MG/DL (ref 8.6–10.4)
CHLORIDE BLD-SCNC: 98 MMOL/L (ref 98–107)
CO2: 24 MMOL/L (ref 20–31)
CREAT SERPL-MCNC: 1.23 MG/DL (ref 0.5–0.9)
DIFFERENTIAL TYPE: ABNORMAL
EOSINOPHILS RELATIVE PERCENT: 7 % (ref 0–4)
GFR AFRICAN AMERICAN: 49 ML/MIN
GFR NON-AFRICAN AMERICAN: 41 ML/MIN
GFR SERPL CREATININE-BSD FRML MDRD: ABNORMAL ML/MIN/{1.73_M2}
GFR SERPL CREATININE-BSD FRML MDRD: ABNORMAL ML/MIN/{1.73_M2}
GLUCOSE BLD-MCNC: 87 MG/DL (ref 70–99)
HCT VFR BLD CALC: 34.9 % (ref 36–46)
HEMOGLOBIN: 11.7 G/DL (ref 12–16)
IMMATURE GRANULOCYTES: ABNORMAL %
LYMPHOCYTES # BLD: 17 % (ref 24–44)
MCH RBC QN AUTO: 31.6 PG (ref 26–34)
MCHC RBC AUTO-ENTMCNC: 33.6 G/DL (ref 31–37)
MCV RBC AUTO: 94.1 FL (ref 80–100)
MONOCYTES # BLD: 11 % (ref 1–7)
NRBC AUTOMATED: ABNORMAL PER 100 WBC
PDW BLD-RTO: 15.4 % (ref 11.5–14.9)
PLATELET # BLD: 248 K/UL (ref 150–450)
PLATELET ESTIMATE: ABNORMAL
PMV BLD AUTO: 7.7 FL (ref 6–12)
POTASSIUM SERPL-SCNC: 4.2 MMOL/L (ref 3.7–5.3)
RBC # BLD: 3.71 M/UL (ref 4–5.2)
RBC # BLD: ABNORMAL 10*6/UL
SEG NEUTROPHILS: 64 % (ref 36–66)
SEGMENTED NEUTROPHILS ABSOLUTE COUNT: 3.6 K/UL (ref 1.3–9.1)
SODIUM BLD-SCNC: 135 MMOL/L (ref 135–144)
WBC # BLD: 5.7 K/UL (ref 3.5–11)
WBC # BLD: ABNORMAL 10*3/UL

## 2020-02-08 PROCEDURE — 6370000000 HC RX 637 (ALT 250 FOR IP): Performed by: STUDENT IN AN ORGANIZED HEALTH CARE EDUCATION/TRAINING PROGRAM

## 2020-02-08 PROCEDURE — 99239 HOSP IP/OBS DSCHRG MGMT >30: CPT | Performed by: INTERNAL MEDICINE

## 2020-02-08 PROCEDURE — 6360000002 HC RX W HCPCS: Performed by: STUDENT IN AN ORGANIZED HEALTH CARE EDUCATION/TRAINING PROGRAM

## 2020-02-08 PROCEDURE — 36415 COLL VENOUS BLD VENIPUNCTURE: CPT

## 2020-02-08 PROCEDURE — 85025 COMPLETE CBC W/AUTO DIFF WBC: CPT

## 2020-02-08 PROCEDURE — 80048 BASIC METABOLIC PNL TOTAL CA: CPT

## 2020-02-08 PROCEDURE — 2580000003 HC RX 258: Performed by: STUDENT IN AN ORGANIZED HEALTH CARE EDUCATION/TRAINING PROGRAM

## 2020-02-08 RX ORDER — TORSEMIDE 20 MG/1
40 TABLET ORAL DAILY
Qty: 60 TABLET | Refills: 0 | Status: SHIPPED | OUTPATIENT
Start: 2020-02-08 | End: 2020-03-09 | Stop reason: SDUPTHER

## 2020-02-08 RX ADMIN — FAMOTIDINE 20 MG: 20 TABLET ORAL at 09:12

## 2020-02-08 RX ADMIN — Medication 10 ML: at 09:13

## 2020-02-08 RX ADMIN — BUPROPION HYDROCHLORIDE 100 MG: 100 TABLET, FILM COATED, EXTENDED RELEASE ORAL at 09:14

## 2020-02-08 RX ADMIN — TORSEMIDE 20 MG: 20 TABLET ORAL at 09:14

## 2020-02-08 RX ADMIN — RANOLAZINE 500 MG: 500 TABLET, FILM COATED, EXTENDED RELEASE ORAL at 09:12

## 2020-02-08 RX ADMIN — SPIRONOLACTONE 25 MG: 25 TABLET, FILM COATED ORAL at 09:12

## 2020-02-08 RX ADMIN — POTASSIUM CHLORIDE 20 MEQ: 1500 TABLET, EXTENDED RELEASE ORAL at 09:12

## 2020-02-08 RX ADMIN — CYANOCOBALAMIN TAB 1000 MCG 1000 MCG: 1000 TAB at 09:13

## 2020-02-08 RX ADMIN — HEPARIN SODIUM 5000 UNITS: 5000 INJECTION INTRAVENOUS; SUBCUTANEOUS at 06:21

## 2020-02-08 RX ADMIN — LEVOTHYROXINE SODIUM 100 MCG: 100 TABLET ORAL at 09:12

## 2020-02-08 RX ADMIN — PRIMIDONE 50 MG: 50 TABLET ORAL at 09:14

## 2020-02-08 RX ADMIN — DOCUSATE SODIUM 100 MG: 100 CAPSULE, LIQUID FILLED ORAL at 09:12

## 2020-02-08 RX ADMIN — ASPIRIN 81 MG: 81 TABLET, COATED ORAL at 09:12

## 2020-02-08 RX ADMIN — ACETAMINOPHEN 650 MG: 325 TABLET, FILM COATED ORAL at 10:51

## 2020-02-08 ASSESSMENT — ENCOUNTER SYMPTOMS
BACK PAIN: 1
ABDOMINAL PAIN: 0
WHEEZING: 0
STRIDOR: 0
CHEST TIGHTNESS: 0
DIARRHEA: 0
COUGH: 0
TROUBLE SWALLOWING: 0
CONSTIPATION: 0
ABDOMINAL DISTENTION: 0
SHORTNESS OF BREATH: 0
VOMITING: 0
NAUSEA: 0

## 2020-02-08 ASSESSMENT — PAIN SCALES - GENERAL
PAINLEVEL_OUTOF10: 3
PAINLEVEL_OUTOF10: 8

## 2020-02-08 NOTE — CARE COORDINATION
Continuity of Care Form    Patient Name: Lester Sanchez   :  1927  MRN:  [id number]    Admit date:  2020  Discharge date:  2020    Code Status Order: Prior   Advance Directives:     Admitting Physician:  Riri Augustine MD  PCP: Riri Augustine MD    Discharging Nurse: [facility] Unit/Room#: 2042/2042-01  Discharging Unit Phone Number: [phone]    Emergency Contact:   Extended Emergency Contact Information  Primary Emergency Contact: Aneudy Barboza [facility] [address] [facility] Phone: [phone]  Mobile Phone: [phone]  Relation: Child  Secondary Emergency Contact: [address] [place] [address] [address] [facility] Phone: [phone]  Relation: Child    Past Surgical History:  Past Surgical History:   Procedure Laterality Date    BLADDER SUSPENSION      x3    CARDIAC CATHETERIZATION  4-13    severe disease small d1 and PDA    CATARACT REMOVAL Bilateral     CHOLECYSTECTOMY      HYSTERECTOMY      ovaries removed at a later date    NOSE SURGERY      cancer removed    SALPINGO-OOPHORECTOMY      UPPER GASTROINTESTINAL ENDOSCOPY N/A 2018    EGD BIOPSY performed by Ricardo Self MD at [address] [place] [address]       Immunization History:   Immunization History   Administered Date(s) Administered    Influenza, High Dose (Fluzone 65 yrs and older) 2015, 10/11/2016, 10/23/2017, 2018    Influenza, Triv, inactivated, subunit, adjuvanted, IM (Fluad 65 yrs and older) 10/15/2019    Pneumococcal Conjugate 13-valent (Jodelle Efren) 2015    Pneumococcal Polysaccharide ([id number]) 10/11/2016       Active Problems:  Patient Active Problem List   Diagnosis Code    Depression F32.9    Osteoarthritis M19.90    CKD (chronic kidney disease) stage 4, GFR 15-29 ml/min ([facility]) N18.4    Primary osteoarthritis involving multiple joints M15.0    GERD (gastroesophageal reflux disease) K21.9    History of total abdominal hysterectomy Z90.710    History of skin cancer J24.065 or output data in the 24 hours ending 02/06/20 1701  No intake/output data recorded. Safety Concerns:     None    Impairments/Disabilities:      Vision pt wears uppers and lowers dentures     Nutrition Therapy:  Current Nutrition Therapy:   - Oral Diet:  General    Routes of Feeding: Oral  Liquids: No Restrictions  Daily Fluid Restriction: no  Last Modified Barium Swallow with Video (Video Swallowing Test): not done    Treatments at the Time of Hospital Discharge:   Respiratory Treatments: see MAR  Oxygen Therapy:  is on oxygen at 2 L/min per nasal cannula. HS  Ventilator:    - No ventilator support    Rehab Therapies: Physical Therapy and Occupational Therapy  Weight Bearing Status/Restrictions: No weight bearing restirctions  Other Medical Equipment (for information only, NOT a DME order):  walker and bath bench scooter and grab bars  Other Treatments: skilled nursing assessment and monitoring medication education, resume HHA as previously ordered, resume previous orders  Home health care agency's  to evaluate patient two weeks prior to discharge from home health to determine post-discharge services. HHA services as prior to admission. Patient's personal belongings (please select all that are sent with patient):  [place]    RN SIGNATURE:  Electronically signed by Benny Garcia RN on 2/6/20 at 5:05 PM    CASE MANAGEMENT/SOCIAL WORK SECTION    Inpatient Status Date: 2/6/2020    Readmission Risk Assessment Score:  Readmission Risk              Risk of Unplanned Readmission:        0           Discharging to Facility/ Agency   · Name: [facility] Dept  Skilled nursing  · Address: [facility]  · Phone:[phone]  · Fax:[phone]    IF PT DC ON WEEKEND, [address] DEPT NURSE ON CALL FOR VNS SERVICES. [phone].       [facility] [address] [place] [address] [facility], [address] [place] [address]  P: [phone]  F: [phone]      / signature: Electronically signed by Blu Negrete RN on 2/6/20 at 5:02 PM    PHYSICIAN SECTION    Prognosis: Fair    Condition at Discharge: Stable    Rehab Potential (if transferring to Rehab): Fair    Recommended Labs or Other Treatments After Discharge: see above    Physician Certification: I certify the above information and transfer of Denise Kaur  is necessary for the continuing treatment of the diagnosis listed and that she requires Home Care for greater 30 days. Update Admission H&P: No change in H&P    PHYSICIAN SIGNATURE:  Verbal order Dr. Agustín De La Fuente Electronically signed by Gregory Bower RN on 2/8/20 at 10:59 AM    Current Discharge Medication List     START taking these medications     Medication Dose   torsemide (DEMADEX) 20 MG tablet 40 mg   Take 2 tablets by mouth daily   Quantity: 60 tablet Refills: 0           CONTINUE these medications which have NOT CHANGED     Medication Dose   primidone (MYSOLINE) 50 MG tablet 50 mg   Take 50 mg by mouth 2 times daily       metoprolol tartrate (LOPRESSOR) 25 MG tablet 12.5 mg   Take 12.5 mg by mouth nightly       Glucosamine HCl-MSM 1500-500 MG/30ML LIQD 30 mLs   Take 30 mLs by mouth daily       guaiFENesin (MUCINEX) 600 MG extended release tablet 1,200 mg   Take 1,200 mg by mouth 2 times daily as needed for Congestion       polyethylene glycol (GLYCOLAX) powder 17 g   Take 17 g by mouth daily as needed (constipation)       acetaminophen (TYLENOL) 325 MG tablet 650 mg   Take 650 mg by mouth every 6 hours as needed for Pain       miconazole (MICOTIN) 2 % powder    Apply topically 2 times daily.    Quantity: 45 g Refills: 1       levothyroxine (SYNTHROID) 100 MCG tablet 100 mcg   Take 1 tablet by mouth daily   Quantity: 30 tablet Refills: 11       buPROPion (WELLBUTRIN SR) 100 MG extended release tablet 100 mg   Take 1 tablet by mouth 2 times daily   Quantity: 60 tablet Refills: 3 nitroGLYCERIN (NITROSTAT) 0.4 MG SL tablet    up to max of 3 total doses. If no relief after 1 dose, call 911. Quantity: 25 tablet Refills: 3       Nutritional Supplements (ENSURE ORIGINAL) LIQD 1 Bottle   Take 1 Bottle by mouth 2 times daily   Quantity: 60 Bottle Refills: 2       potassium chloride (KLOR-CON M) 20 MEQ extended release tablet 20 mEq   TAKE 1 TABLET BY MOUTH DAILY   Quantity: 90 tablet Refills: 1       metOLazone (ZAROXOLYN) 2.5 MG tablet 2.5 mg   Take 1 tablet by mouth every 72 hours   Quantity: 30 tablet Refills: 3       spironolactone (ALDACTONE) 25 MG tablet 25 mg   Take 1 tablet by mouth daily   Quantity: 30 tablet Refills: 3       famotidine (PEPCID) 20 MG tablet 20 mg   TAKE 1 TABLET BY MOUTH 2 TIMES DAILY   Quantity: 60 tablet Refills: 8       loratadine (CLARITIN) 10 MG tablet 10 mg   Take 10 mg by mouth daily    Refills: 5        sertraline (ZOLOFT) 100 MG tablet 100 mg   Take 100 mg by mouth nightly       cyanocobalamin 1000 MCG tablet 1,000 mcg   Take 1,000 mcg by mouth daily       fluticasone (FLONASE) 50 MCG/ACT nasal spray 1 spray   1 spray by Each Nare route daily 1 Spray in each nostril   Quantity: 2 Bottle Refills: 1       ranolazine (RANEXA) 500 MG extended release tablet 500 mg   Take 500 mg by mouth 2 times daily       docusate sodium (COLACE, DULCOLAX) 100 MG CAPS 100 mg   Take 100 mg by mouth daily   Quantity: 30 capsule Refills: 3       aspirin 81 MG EC tablet 81 mg   Take 81 mg by mouth daily        Multiple Vitamins-Minerals (THERAPEUTIC MULTIVITAMIN-MINERALS) tablet 1 tablet   Take 1 tablet by mouth daily.            STOP taking these previous medications     Medication Dose Reason for Stopping Comments   (STOP TAKING) bumetanide (BUMEX) 2 MG tablet 2 mg           (STOP TAKING) HYDROcodone-acetaminophen (NORCO) 5-325 MG per tablet 1 tablet           (STOP TAKING) Dextromethorphan-guaiFENesin (MUCINEX DM)  MG TB12

## 2020-02-08 NOTE — PROGRESS NOTES
reviewed ;   Vitals:    02/07/20 1423 02/07/20 1819 02/08/20 0545 02/08/20 0708   BP: (!) 116/56 (!) 124/50  (!) 144/56   Pulse: 71 81  81   Resp: 16 16  16   Temp: 98.1 °F (36.7 °C) 97.9 °F (36.6 °C)  98.8 °F (37.1 °C)   TempSrc: Oral Oral  Oral   SpO2: 99% 97%  96%   Weight:   187 lb 6.3 oz (85 kg)    Height:          Recent Results (from the past 24 hour(s))   Basic Metabolic Panel w/ Reflex to MG    Collection Time: 02/08/20  6:22 AM   Result Value Ref Range    Glucose 87 70 - 99 mg/dL    BUN 19 8 - 23 mg/dL    CREATININE 1.23 (H) 0.50 - 0.90 mg/dL    Bun/Cre Ratio NOT REPORTED 9 - 20    Calcium 8.5 (L) 8.6 - 10.4 mg/dL    Sodium 135 135 - 144 mmol/L    Potassium 4.2 3.7 - 5.3 mmol/L    Chloride 98 98 - 107 mmol/L    CO2 24 20 - 31 mmol/L    Anion Gap 13 9 - 17 mmol/L    GFR Non-African American 41 (L) >60 mL/min    GFR  49 (L) >60 mL/min    GFR Comment          GFR Staging NOT REPORTED    CBC auto differential    Collection Time: 02/08/20  6:22 AM   Result Value Ref Range    WBC 5.7 3.5 - 11.0 k/uL    RBC 3.71 (L) 4.0 - 5.2 m/uL    Hemoglobin 11.7 (L) 12.0 - 16.0 g/dL    Hematocrit 34.9 (L) 36 - 46 %    MCV 94.1 80 - 100 fL    MCH 31.6 26 - 34 pg    MCHC 33.6 31 - 37 g/dL    RDW 15.4 (H) 11.5 - 14.9 %    Platelets 666 832 - 605 k/uL    MPV 7.7 6.0 - 12.0 fL    NRBC Automated NOT REPORTED per 100 WBC    Differential Type NOT REPORTED     Seg Neutrophils 64 36 - 66 %    Lymphocytes 17 (L) 24 - 44 %    Monocytes 11 (H) 1 - 7 %    Eosinophils % 7 (H) 0 - 4 %    Basophils 1 0 - 2 %    Immature Granulocytes NOT REPORTED 0 %    Segs Absolute 3.60 1.3 - 9.1 k/uL    Absolute Lymph # 1.00 1.0 - 4.8 k/uL    Absolute Mono # 0.60 0.1 - 1.3 k/uL    Absolute Eos # 0.40 0.0 - 0.4 k/uL    Basophils Absolute 0.10 0.0 - 0.2 k/uL    Absolute Immature Granulocyte NOT REPORTED 0.00 - 0.30 k/uL    WBC Morphology NOT REPORTED     RBC Morphology NOT REPORTED     Platelet Estimate NOT REPORTED      No results for input(s): POCGLU in the last 72 hours. No results found. Past Medical History:   has a past medical history of Bipolar disorder ([facility] CHF (congestive heart failure) ([facility] Chronic kidney disease, COPD (chronic obstructive pulmonary disease) ([facility] Coronary artery disease involving native coronary artery of native heart with unstable angina pectoris ([facility] Depression, Esophageal dysphagia, GERD (gastroesophageal reflux disease), History of skin cancer, HTN (hypertension), Hypothyroidism, MI (myocardial infarction) ([facility] Osteoarthritis, and Sleep apnea. Social History:   reports that she has quit smoking. She has never used smokeless tobacco. She reports that she does not drink alcohol or use drugs. Family History:   Family History   Problem Relation Age of Onset    Heart Disease Mother     Cancer Father     Stroke Sister     Heart Disease Sister        Vitals:  BP (!) 144/56   Pulse 81   Temp 98.8 °F (37.1 °C) (Oral)   Resp 16   Ht 4' 8\" (1.422 m)   Wt 187 lb 6.3 oz (85 kg)   LMP  (LMP Unknown)   SpO2 96%   BMI 42.01 kg/m²   Temp (24hrs), Av.3 °F (36.8 °C), Min:97.9 °F (36.6 °C), Max:98.8 °F (37.1 °C)    No results for input(s): POCGLU in the last 72 hours. I/O(24Hr): Intake/Output Summary (Last 24 hours) at 2020 0727  Last data filed at 2020 0559  Gross per 24 hour   Intake 1162 ml   Output 900 ml   Net 262 ml       Labs:  [unfilled]    Lab Results   Component Value Date/Time    SPECIAL NOT REPORTED 2019 08:53 PM     Lab Results   Component Value Date/Time    CULTURE  2018 05:50 PM     DUE TO THE SPECIMEN TYPE, THE ORDER WAS CANCELED AND REORDERED. PLEASE REFER TO:    CULTURE TISSUE CULTURE 2018 05:50 PM    CULTURE (A) 2018 05:50 PM     STAPHYLOCOCCUS SPECIES, COAGULASE NEGATIVE MODERATE GROWTH    CULTURE NO ANAEROBIC ORGANISMS ISOLATED AT 5 DAYS (A) 2018 05:50 PM       [unfilled]    Radiology:    No results found.     Physical Examination:        Physical Exam  Constitutional:       General: She is not in acute distress. Appearance: Normal appearance. She is obese. She is not diaphoretic. HENT:      Head: Normocephalic and atraumatic. Mouth/Throat:      Mouth: Mucous membranes are moist.   Eyes:      Pupils: Pupils are equal, round, and reactive to light. Neck:      Musculoskeletal: Normal range of motion and neck supple. Thyroid: No thyromegaly. Vascular: No JVD. Cardiovascular:      Rate and Rhythm: Normal rate and regular rhythm. Heart sounds: Normal heart sounds. Comments: No murmur, rub, or gallops appreciated  Pulmonary:      Effort: Pulmonary effort is normal. No respiratory distress. Breath sounds: Normal breath sounds. No wheezing or rales. Chest:      Chest wall: No tenderness. Abdominal:      General: Bowel sounds are normal. There is no distension. Palpations: Abdomen is soft. Tenderness: There is no abdominal tenderness. Musculoskeletal:      Comments: Moves all extremities, trace edema at ankles   Skin:     General: Skin is warm and dry. Neurological:      General: No focal deficit present. Mental Status: She is alert and oriented to person, place, and time. Mental status is at baseline. Cranial Nerves: No cranial nerve deficit. Sensory: No sensory deficit.    Psychiatric:         Mood and Affect: Mood normal.         Behavior: Behavior normal.           Assessment:        Primary Problem  Hyponatremia    Active Hospital Problems    Diagnosis Date Noted    General weakness [R53.1]     Diuretic-induced hypokalemia [E87.6, T50.2X5A]     Hyponatremia [E87.1] 11/18/2019    Acquired hypothyroidism [E03.9] 10/11/2016    HTN (hypertension) [I10] 04/13/2016    Depression [F32.9]          ----    Condition    [] ill ,     [] high risk , [] critical ,          [] Iseptic---[] delirium ,      [x] stable,            [] I----     Unit  [] ICU    [] PICU    [x] MED_SRG     []  Other                 Plan:        Hyponatremia likely 2/2 volume overload  -pt asymtomatic  -Na back in normal range now 135, will dc today  -dc fluids      Diuretic-induced hypokalemia-resolved  -K replacement  - K 4.2     CKD  -continue home meds,   -Cr down to 1.23 from 1.54, baseline 1.2 to 1.7     Chronic combined heart failure   -Continue home meds, will change home bumex to torsemide for better bioavailablity     Hypothyroidism  -Continue home medication     Depression  -Stable continue home medication     Chronic constipation  - Continue home meds     Chronic pain  - PRN Norco         Diet:renal  Dispo: dc today back to pt home where she lives with her family  PT/OT  DVT prophylaxis: Elder Swain MD  2/8/2020  7:27 AM

## 2020-02-08 NOTE — PROGRESS NOTES
Pt discharged at this time all questions were answered and gone over with the pt and the daughter that was present. The pt decided to do meds to beds, the writer went down stairs and picked up her medication for her. Pt and daughter were educated on follow up appointment times and when the visiting nurse would be coming out. Pt taken down via w/c by the aide.

## 2020-02-08 NOTE — DISCHARGE SUMMARY
[address]    Discharge Summary     Patient ID: [place]  :  1927   MRN: [id number]     ACCOUNT:  [de-identified]   Patient's PCP: Darren Wolff MD  Admit Date: 2020   Discharge Date: 2020     Length of Stay: 2  Code Status:  DNR-CCA  Admitting Physician: Darren Wolff MD  Discharge Physician: Eduardo Wong MD     Active Discharge Diagnoses:       Primary Problem  Hyponatremia      [place] Problems    Diagnosis Date Noted    General weakness [R53.1]     Diuretic-induced hypokalemia [E87.6, T50.2X5A]     Hyponatremia [E87.1] 2019    Acquired hypothyroidism [E03.9] 10/11/2016    HTN (hypertension) [I10] 2016    Depression [F32.9]        Admission Condition:  fair     Discharged Condition: fair    Hospital Stay:       Hospital Course:  [place] is a 80 y.o. female who was admitted for the management of   Hyponatremia , presented to ER with *Abnormal Lab (low sodium) and Fatigue     Vital signs on arrival to the ER include temperature 97, respiratory rate 18, pulse 88, /65, satting 96% on room air. Patient was found to have a sodium level of 128 on outside labs was recommended to come to the ER. On arrival to the ER patient sodium was 131, Cr 1.5, and K of  3.3 and she was admitted. She was given IV fluids and potassium replacement. On day of discharge her sodium was 135. There were no acute events during her stay and pt is stable to OR home. Her home Bumex was changed over to torsemide due to better bioavailability.     Significant therapeutic interventions: IV fluids    Significant Diagnostic Studies:   Labs / Micro:  CBC:   Lab Results   Component Value Date    WBC 5.7 2020    RBC 3.71 2020    RBC 4.24 2012    HGB 11.7 2020    HCT 34.9 2020    MCV 94.1 2020    MCH 31.6 2020    MCHC 33.6 2020    RDW 15.4 02/08/2020     02/08/2020     06/03/2012     BMP:    Lab Results   Component Value Date    GLUCOSE 87 02/08/2020    GLUCOSE 94 06/03/2012     02/08/2020    K 4.2 02/08/2020    CL 98 02/08/2020    CO2 24 02/08/2020    ANIONGAP 13 02/08/2020    BUN 19 02/08/2020    CREATININE 1.23 02/08/2020    BUNCRER NOT REPORTED 02/08/2020    CALCIUM 8.5 02/08/2020    LABGLOM 41 02/08/2020    GFRAA 49 02/08/2020    GFR      02/08/2020    GFR NOT REPORTED 02/08/2020         Radiology:    No results found. Consultations:    Consults:     Final Specialist Recommendations/Findings:   IP CONSULT TO PRIMARY CARE PROVIDER  IP CONSULT TO SOCIAL WORK      The patient was seen and examined on day of discharge and this discharge summary is in conjunction with any daily progress note from day of discharge. Discharge plan:       Disposition: Home    Physician Follow Up:     Armen Sneed MD  [facility]  [address] [address]  [phone]    In 2 weeks  follow up    [facility] [address] Follow up 2/11/20, if unable to keep this scheduled apt. call office within 24 hours to cancel. [address] [facility] [address] [phone]      NURSE WILL BE OUT TO SEE YOU MONDAY MORNING. CALL IF YOU NEED ANYTING IN THE MEAN TIME. Requiring Further Evaluation/Follow Up POST HOSPITALIZATION/Incidental Findings: none    Diet: regular diet    Activity: As tolerated    Instructions to Patient:   - Take all medications as prescribed  - Follow up with PCP   - In case of any worsening condition please visit Emergency Room.       Discharge Medications:      Medication List      START taking these medications    torsemide 20 MG tablet  Commonly known as:  DEMADEX  Take 2 tablets by mouth daily        CONTINUE taking these medications    acetaminophen 325 MG tablet  Commonly known as:  TYLENOL     aspirin 81 MG EC tablet     buPROPion 100 MG extended release tablet  Commonly known as:  WELLBUTRIN SR  Take 1 tablet by mouth 2 times daily     cyanocobalamin 1000 MCG tablet     docusate 100 MG Caps  Commonly known as:  COLACE, DULCOLAX  Take 100 mg by mouth daily     Ensure Original Liqd  Take 1 Bottle by mouth 2 times daily     famotidine 20 MG tablet  Commonly known as:  PEPCID  TAKE 1 TABLET BY MOUTH 2 TIMES DAILY     fluticasone 50 MCG/ACT nasal spray  Commonly known as:  FLONASE  1 spray by Each Nare route daily 1 Spray in each nostril     Glucosamine HCl-MSM 1500-500 MG/30ML Liqd     guaiFENesin 600 MG extended release tablet  Commonly known as:  MUCINEX     levothyroxine 100 MCG tablet  Commonly known as:  SYNTHROID  Take 1 tablet by mouth daily     loratadine 10 MG tablet  Commonly known as:  CLARITIN     metOLazone 2.5 MG tablet  Commonly known as:  ZAROXOLYN     metoprolol tartrate 25 MG tablet  Commonly known as:  LOPRESSOR     miconazole 2 % powder  Commonly known as:  MICOTIN  Apply topically 2 times daily. nitroGLYCERIN 0.4 MG SL tablet  Commonly known as:  NITROSTAT  up to max of 3 total doses. If no relief after 1 dose, call 911. polyethylene glycol powder  Commonly known as:  GLYCOLAX     potassium chloride 20 MEQ extended release tablet  Commonly known as:  KLOR-CON M  TAKE 1 TABLET BY MOUTH DAILY     primidone 50 MG tablet  Commonly known as:   MYSOLINE     ranolazine 500 MG extended release tablet  Commonly known as:  RANEXA     sertraline 100 MG tablet  Commonly known as:  ZOLOFT     spironolactone 25 MG tablet  Commonly known as:  ALDACTONE  Take 1 tablet by mouth daily     therapeutic multivitamin-minerals tablet        STOP taking these medications    bumetanide 2 MG tablet  Commonly known as:  BUMEX           Where to Get Your Medications      These medications were sent to [facility], [address]  [address] [address]

## 2020-02-08 NOTE — PROGRESS NOTES
DATE: 2020    NAME: Britton Lopez  MRN: [id number]   : 1927    Patient not seen this date for Physical Therapy due to:  [] Blood transfusion in progress  [] Hemodialysis  []  Patient Declined  [] Spine Precautions   [] Strict Bedrest  [] Surgery/ Procedure  [] Testing      [x] Other ANGELA Fraire informs writer (at 10:44 AM) pt is D/Cing this date and to cx therapy. [] PT being discontinued at this time. Patient independent. No further needs. [] PT being discontinued at this time as the patient has been transferred to palliative care. No further needs.     Peewee Villalba, PTA

## 2020-02-08 NOTE — PROGRESS NOTES
CLINICAL PHARMACY NOTE: MEDS TO [address] Select Patient?: Yes  Total # of Prescriptions Filled: 1   The following medications were delivered to the patient:  · demadex  Total # of Interventions Completed: 1  Time Spent (min): 30    Additional Documentation:  We only had #30 in stock-pt will get the remainder from her local rx-they just have to call us. RN came to pharmacy and picked up.  No copay

## 2020-02-10 ENCOUNTER — TELEPHONE (OUTPATIENT)
Dept: INTERNAL MEDICINE CLINIC | Age: 85
End: 2020-02-10

## 2020-02-10 ENCOUNTER — CARE COORDINATION (OUTPATIENT)
Dept: CASE MANAGEMENT | Age: 85
End: 2020-02-10

## 2020-02-10 PROCEDURE — 1111F DSCHRG MED/CURRENT MED MERGE: CPT | Performed by: INTERNAL MEDICINE

## 2020-02-10 NOTE — TELEPHONE ENCOUNTER
Justin [facility] Transitions Initial Follow Up Call    Outreach made within 2 business days of discharge: YES    Patient: Gisela Gentile Patient : 1927   MRN: [id number]  Reason for Admission:   Discharge Date: 20       Spoke with: Care Taker    Discharge department/facility: [facility] Interactive Patient Contact:  Was patient able to fill all prescriptions: YES  Was patient instructed to bring all medications to the follow-up visit: YES  Is patient taking all medications as directed in the discharge summary YES  Does patient understand their discharge instructions: YES  Does patient have questions or concerns that need addressed prior to 7-14 day follow up office visit NO     Scheduled appointment with PCP within 7-14 days    Follow Up 2020  Future Appointments   Date Time Provider Anika Celaya   2020  2:00 PM Alex Jackson PA-C [address]   3/11/2020  1:00 PM Nickolas Sarah MD . [address]

## 2020-02-10 NOTE — CARE COORDINATION
[facility] Transitions Initial Follow Up Call    Call within 2 business days of discharge: Yes    Patient: Radhames Abrams Patient : 1927   MRN: [id number]  Reason for Admission:Hyponatremia /General weakness  Discharge Date: 20 RARS: Readmission Risk Score: 25      Last Discharge [facility]       Complaint Diagnosis Description Type Department Provider    20 Abnormal Lab; Fatigue General weakness . .. ED to Hosp-Admission (Discharged) (ADMITTED) Rogerio Abdi MD; Miki Daily. .. Spoke with: [address]: [facility]    Attempted to Joey Eugene  for initial transitional outreach, but her daughter, Elaine Perez answered the phone. She stated that [place] was doing better. She said that her weight was up 2 lbs, from her weight pre admission, her left leg is swollen and her right leg was fine. She said that [place] has some exertional dyspnea and  was coughing yesterday, but it resolved later in the day. Medications reviewed and all medications in home. 1111F order completed. Visiting nurse to visit today at 56 am.  Elaine Perez aware of PCP appointment. Explained CTN role and she was receptive to further outreach. Contact information provided.     Non-face-to-face services provided:  Scheduled appointment with PCP-  Obtained and reviewed discharge summary and/or continuity of care documents  Assessment and support for treatment adherence and medication management-reviewed    Care Transitions 24 Hour Call    Do you have any ongoing symptoms?:  Yes  Patient-reported symptoms:  Other (Comment: swelling)  Do you have a copy of your discharge instructions?:  Yes  Do you have all of your prescriptions and are they filled?:  Yes  Have you been contacted by a [address]?:  No  Have you scheduled your follow up appointment?:  Yes  How are you going to get to your appointment?:  Car - family or friend to transport  Were you discharged with any Home Care or Post

## 2020-02-10 NOTE — TELEPHONE ENCOUNTER
Referrizer called and wanted to make sure meds a clarified at [address] 2/11 scheduled appt, states on one of the discharge med list to dc mucinex and the other to continue?

## 2020-02-11 ENCOUNTER — TELEPHONE (OUTPATIENT)
Dept: INTERNAL MEDICINE CLINIC | Age: 85
End: 2020-02-11

## 2020-02-11 ENCOUNTER — OFFICE VISIT (OUTPATIENT)
Dept: INTERNAL MEDICINE CLINIC | Age: 85
End: 2020-02-11
Payer: COMMERCIAL

## 2020-02-11 VITALS
DIASTOLIC BLOOD PRESSURE: 56 MMHG | BODY MASS INDEX: 42.29 KG/M2 | OXYGEN SATURATION: 94 % | HEIGHT: 56 IN | SYSTOLIC BLOOD PRESSURE: 113 MMHG | WEIGHT: 188 LBS | HEART RATE: 68 BPM

## 2020-02-11 PROCEDURE — 1111F DSCHRG MED/CURRENT MED MERGE: CPT | Performed by: PHYSICIAN ASSISTANT

## 2020-02-11 PROCEDURE — 99495 TRANSJ CARE MGMT MOD F2F 14D: CPT | Performed by: PHYSICIAN ASSISTANT

## 2020-02-11 NOTE — TELEPHONE ENCOUNTER
[facility] Transitions Initial Follow Up Call    Outreach made within 2 business days of discharge: Yes    Patient: Kwaku Sandhu Patient : 1927   MRN: [id number]  Reason for Admission: There are no discharge diagnoses documented for the most recent discharge.   Discharge Date: 20       Spoke with:     Discharge department/facility: [address] [place] [address] Interactive Patient Contact:  Patient has appt today with Glenroy Lambert     Scheduled appointment with PCP within 7-14 days    Follow Up  Future Appointments   Date Time Provider Anika Celaya   2020  2:00 PM Alicja Houser PA-C [address]   3/11/2020  1:00 PM Liat Morris MD [address] [place] [address]

## 2020-02-11 NOTE — PROGRESS NOTES
Post-Discharge Transitional Care Management Services or Hospital Follow Up      [address]   YOB: 1927    Date of Office Visit:  2/11/2020  Date of Hospital Admission: 2/6/20  Date of Hospital Discharge: 2/8/20  Risk of hospital readmission (high >=14%.  Medium >=10%) :Readmission Risk Score: 25    Care management risk score Rising risk (score 2-5) and Complex Care (Scores >=6): 4     Non face to face  following discharge, date last encounter closed (first attempt may have been earlier): 2/11/2020  8:48 AM    Call initiated 2 business days of discharge: Yes    Patient Active Problem List   Diagnosis    Depression    Osteoarthritis    CKD (chronic kidney disease) stage 4, GFR 15-29 ml/min ([facility])    Primary osteoarthritis involving multiple joints    GERD (gastroesophageal reflux disease)    History of total abdominal hysterectomy    History of skin cancer    Sleep apnea    HTN (hypertension)    Acquired hypothyroidism    Unilateral inguinal hernia without obstruction or gangrene    Coronary artery disease involving native coronary artery of native heart with unstable angina pectoris ([facility])    Chronic diastolic heart failure ([facility])    Hordeolum externum of right upper eyelid    Esophageal dysphagia    Constipation    [facility]    Hypothyroidism    COPD (chronic obstructive pulmonary disease) ([facility])    Chronic kidney disease    CHF (congestive heart failure) ([facility])    Leg wound, right, initial encounter    Throat discomfort    CHF (congestive heart failure), NYHA class I, acute on chronic, combined ([facility])    Congestive heart failure ([facility])    Hyponatremia    MCI (mild cognitive impairment)    Diuretic-induced hypokalemia    General weakness     Allergies   Allergen Reactions    Dye [Iodides] Hives     IVP dye caused reaction with hives      Medications listed as ordered at the time of discharge from hospital   [place] [address] Medication Instructions LV:    Printed on:02/11/20 4592   Medication Information                      acetaminophen (TYLENOL) 325 MG tablet  Take 650 mg by mouth every 6 hours as needed for Pain             aspirin 81 MG EC tablet  Take 81 mg by mouth daily              buPROPion (WELLBUTRIN SR) 100 MG extended release tablet  Take 1 tablet by mouth 2 times daily             cyanocobalamin 1000 MCG tablet  Take 1,000 mcg by mouth daily             docusate sodium (COLACE, DULCOLAX) 100 MG CAPS  Take 100 mg by mouth daily             famotidine (PEPCID) 20 MG tablet  TAKE 1 TABLET BY MOUTH 2 TIMES DAILY             fluticasone (FLONASE) 50 MCG/ACT nasal spray  1 spray by Each Nare route daily 1 Spray in each nostril             Glucosamine HCl-MSM 1500-500 MG/30ML LIQD  Take 30 mLs by mouth daily             guaiFENesin (MUCINEX) 600 MG extended release tablet  Take 1,200 mg by mouth 2 times daily as needed for Congestion             levothyroxine (SYNTHROID) 100 MCG tablet  Take 1 tablet by mouth daily             loratadine (CLARITIN) 10 MG tablet  Take 10 mg by mouth daily              metOLazone (ZAROXOLYN) 2.5 MG tablet  Take 1 tablet by mouth every 72 hours             metoprolol tartrate (LOPRESSOR) 25 MG tablet  Take 12.5 mg by mouth nightly             miconazole (MICOTIN) 2 % powder  Apply topically 2 times daily. Multiple Vitamins-Minerals (THERAPEUTIC MULTIVITAMIN-MINERALS) tablet  Take 1 tablet by mouth daily. nitroGLYCERIN (NITROSTAT) 0.4 MG SL tablet  up to max of 3 total doses. If no relief after 1 dose, call 911.              Nutritional Supplements (ENSURE ORIGINAL) LIQD  Take 1 Bottle by mouth 2 times daily             polyethylene glycol (GLYCOLAX) powder  Take 17 g by mouth daily as needed (constipation)             potassium chloride (KLOR-CON M) 20 MEQ extended release tablet  TAKE 1 TABLET BY MOUTH DAILY             primidone (MYSOLINE) 50 MG tablet  Take 50 mg by mouth 2 times daily             ranolazine (RANEXA) 500 MG extended release tablet  Take 500 mg by mouth 2 times daily             spironolactone (ALDACTONE) 25 MG tablet  Take 1 tablet by mouth daily             torsemide (DEMADEX) 20 MG tablet  Take 2 tablets by mouth daily                 Medications marked \"taking\" at this time  Outpatient Medications Marked as Taking for the 2/11/20 encounter (Office Visit) with Matilde Wong PA-C   Medication Sig Dispense Refill    torsemide (DEMADEX) 20 MG tablet Take 2 tablets by mouth daily 60 tablet 0    primidone (MYSOLINE) 50 MG tablet Take 50 mg by mouth 2 times daily      metoprolol tartrate (LOPRESSOR) 25 MG tablet Take 12.5 mg by mouth nightly      Glucosamine HCl-MSM 1500-500 MG/30ML LIQD Take 30 mLs by mouth daily      guaiFENesin (MUCINEX) 600 MG extended release tablet Take 1,200 mg by mouth 2 times daily as needed for Congestion      polyethylene glycol (GLYCOLAX) powder Take 17 g by mouth daily as needed (constipation)      acetaminophen (TYLENOL) 325 MG tablet Take 650 mg by mouth every 6 hours as needed for Pain      miconazole (MICOTIN) 2 % powder Apply topically 2 times daily. 45 g 1    levothyroxine (SYNTHROID) 100 MCG tablet Take 1 tablet by mouth daily 30 tablet 11    buPROPion (WELLBUTRIN SR) 100 MG extended release tablet Take 1 tablet by mouth 2 times daily 60 tablet 3    nitroGLYCERIN (NITROSTAT) 0.4 MG SL tablet up to max of 3 total doses.  If no relief after 1 dose, call 911. 25 tablet 3    Nutritional Supplements (ENSURE ORIGINAL) LIQD Take 1 Bottle by mouth 2 times daily 60 Bottle 2    potassium chloride (KLOR-CON M) 20 MEQ extended release tablet TAKE 1 TABLET BY MOUTH DAILY 90 tablet 1    metOLazone (ZAROXOLYN) 2.5 MG tablet Take 1 tablet by mouth every 72 hours 30 tablet 3    spironolactone (ALDACTONE) 25 MG tablet Take 1 tablet by mouth daily 30 tablet 3    famotidine (PEPCID) 20 MG tablet TAKE 1 TABLET BY MOUTH 2 TIMES DAILY 60 tablet 8 discharge. Sodium increased to 135 with IV fluids, Bumex changed to Demadex. Potassium improved to 4.2 at discharge, on 20 MEQ extended release tablet. Depression is controlled, patient denies depressed mood. No history of self harm, no suicidal thoughts. A comprehensive review of systems was negative except for what was noted in the HPI. Vitals:    02/11/20 1407   BP: (!) 113/56   Site: Right Upper Arm   Position: Sitting   Cuff Size: Large Adult   Pulse: 68   SpO2: 94%   Weight: 188 lb (85.3 kg)   Height: 4' 7.98\" (1.422 m)     Body mass index is 42.17 kg/m². Wt Readings from Last 3 Encounters:   02/11/20 188 lb (85.3 kg)   02/08/20 187 lb 6.3 oz (85 kg)   02/04/20 188 lb 14.4 oz (85.7 kg)     BP Readings from Last 3 Encounters:   02/11/20 (!) 113/56   02/08/20 (!) 144/56   02/04/20 (!) 120/49      Physical Exam:  General - alert, well appearing, and in no distress  Skin - normal coloration and turgor, no rashes  Eyes - pupils equal and reactive, extraocular eye movements intact  Mouth - mucous membranes moist  Neck - supple, no significant adenopathy, no palpable masses  Chest - clear to auscultation, symmetric air entry  Heart - normal rate, regular rhythm, no murmur  Abdomen - non distended, soft, no tenderness to palpation   Back - no flank tenderness bilaterally   Neurological -alert, oriented, normal speech, no focal sensory or motor deficit, cranial nerve exam without deficit  Extremities - peripheral pulses normal, 2+ pedal edema bilaterally    Assessment/Plan:  1. Hospital discharge follow-up  [facility] discharge summary reviewed, medications reconciled   - RI DISCHARGE MEDS RECONCILED W/ CURRENT OUTPATIENT MED LIST    2. CHF (congestive heart failure), NYHA class I, acute on chronic, combined (HCC)  - Stable, compensated, continue present management  - Basic Metabolic Panel; Future    3.  CKD (chronic kidney disease) stage 4, GFR 15-29 ml/min ([facility])  - Stable, Cr improved 1.54 --> 1.23 at

## 2020-02-12 ENCOUNTER — CARE COORDINATION (OUTPATIENT)
Dept: CASE MANAGEMENT | Age: 85
End: 2020-02-12

## 2020-02-12 NOTE — CARE COORDINATION
[facility] Transitions Follow Up Call    2020    Patient: Star Farah  Patient : 1927   MRN: [id number]  Reason for Admission: hyponatremia  Discharge Date: 20 RARS: Readmission Risk Score: 22         Spoke with: Jeramy Lawrence spoke to patient's daughter Andreina Tucker, patient doing well, is down one pound, still has the edema but getting better each day, patient still has some fatigue but was up walking with daughter yesterday, nurse from [address] [place] [address] present when writer called, will continue to follow//JU    Care Transitions Subsequent and Final Call    Subsequent and Final Calls  Do you have any ongoing symptoms?:  Yes  Onset of Patient-reported symptoms:  Today  Patient-reported symptoms:  Fatigue  Do you have any questions related to your medications?:  No  Do you currently have any active services?:  Yes  Are you currently active with any services?:  Home Health  Do you have any needs or concerns that I can assist you with?:  No  Care Transitions Interventions                          Other Interventions:             Follow Up  Future Appointments   Date Time Provider Anika Celaya   3/11/2020  1:00 PM Mark Anthony Pham MD [address] RN

## 2020-02-14 ENCOUNTER — CARE COORDINATION (OUTPATIENT)
Dept: CASE MANAGEMENT | Age: 85
End: 2020-02-14

## 2020-02-17 ENCOUNTER — CARE COORDINATION (OUTPATIENT)
Dept: CASE MANAGEMENT | Age: 85
End: 2020-02-17

## 2020-02-17 ENCOUNTER — TELEPHONE (OUTPATIENT)
Dept: INTERNAL MEDICINE CLINIC | Age: 85
End: 2020-02-17

## 2020-02-17 NOTE — CARE COORDINATION
[facility] Transitions Follow Up Call    2020    Patient: Cristel Pang  Patient : 1927   MRN: [id number]  Reason for Admission: hyponatremia  Discharge Date: 20 RARS: Readmission Risk Score: 25         # 1 attempt- unable to reach, left vm message with name and call back information, requested call back//JU    Care Transitions Subsequent and Final Call    Subsequent and Final Calls  Are you currently active with any services?:  Home Health  Care Transitions Interventions                          Other Interventions:             Follow Up  Future Appointments   Date Time Provider Anika Celaya   3/11/2020  1:00 PM Nico Rivers MD [address] Earnie Simmonds, RN

## 2020-02-17 NOTE — TELEPHONE ENCOUNTER
Pts dtr called & stated pt is very weak & fatigued. Pt can't walk from her bedroom to the bathroom w/out getting fatigued, per dtr is just wears her out. These are the same symptoms pt had before she went into the hospital.    Per dtr pt has labs that she needs to get done but is unsure if she should get labs done & wait for results or take pt to ED. Please advise.

## 2020-02-19 ENCOUNTER — HOSPITAL ENCOUNTER (OUTPATIENT)
Age: 85
Setting detail: SPECIMEN
Discharge: HOME | End: 2020-02-19
Payer: COMMERCIAL

## 2020-02-19 ENCOUNTER — CARE COORDINATION (OUTPATIENT)
Dept: CASE MANAGEMENT | Age: 85
End: 2020-02-19

## 2020-02-19 ENCOUNTER — OFFICE VISIT (OUTPATIENT)
Dept: INTERNAL MEDICINE CLINIC | Age: 85
End: 2020-02-19
Payer: COMMERCIAL

## 2020-02-19 VITALS
TEMPERATURE: 98.3 F | HEIGHT: 56 IN | WEIGHT: 187 LBS | DIASTOLIC BLOOD PRESSURE: 64 MMHG | HEART RATE: 92 BPM | RESPIRATION RATE: 18 BRPM | BODY MASS INDEX: 42.07 KG/M2 | SYSTOLIC BLOOD PRESSURE: 116 MMHG | OXYGEN SATURATION: 99 %

## 2020-02-19 LAB
ANION GAP SERPL CALCULATED.3IONS-SCNC: 20 MMOL/L (ref 9–17)
BUN BLDV-MCNC: 37 MG/DL (ref 8–23)
BUN/CREAT BLD: ABNORMAL (ref 9–20)
CALCIUM SERPL-MCNC: 9.3 MG/DL (ref 8.6–10.4)
CHLORIDE BLD-SCNC: 91 MMOL/L (ref 98–107)
CO2: 24 MMOL/L (ref 20–31)
CREAT SERPL-MCNC: 1.63 MG/DL (ref 0.5–0.9)
GFR AFRICAN AMERICAN: 36 ML/MIN
GFR NON-AFRICAN AMERICAN: 30 ML/MIN
GFR SERPL CREATININE-BSD FRML MDRD: ABNORMAL ML/MIN/{1.73_M2}
GFR SERPL CREATININE-BSD FRML MDRD: ABNORMAL ML/MIN/{1.73_M2}
GLUCOSE BLD-MCNC: 91 MG/DL (ref 70–99)
HCT VFR BLD CALC: 37.9 % (ref 36.3–47.1)
HEMOGLOBIN: 12.1 G/DL (ref 11.9–15.1)
MCH RBC QN AUTO: 31.1 PG (ref 25.2–33.5)
MCHC RBC AUTO-ENTMCNC: 31.9 G/DL (ref 28.4–34.8)
MCV RBC AUTO: 97.4 FL (ref 82.6–102.9)
NRBC AUTOMATED: 0 PER 100 WBC
PDW BLD-RTO: 14.6 % (ref 11.8–14.4)
PLATELET # BLD: 293 K/UL (ref 138–453)
PMV BLD AUTO: 10.2 FL (ref 8.1–13.5)
POTASSIUM SERPL-SCNC: 4 MMOL/L (ref 3.7–5.3)
RBC # BLD: 3.89 M/UL (ref 3.95–5.11)
SODIUM BLD-SCNC: 135 MMOL/L (ref 135–144)
WBC # BLD: 5 K/UL (ref 3.5–11.3)

## 2020-02-19 PROCEDURE — 99213 OFFICE O/P EST LOW 20 MIN: CPT | Performed by: INTERNAL MEDICINE

## 2020-02-19 NOTE — CARE COORDINATION
[facility] Transitions Follow Up Call    2020    Patient: Gt Guillen  Patient : 1927   MRN: [id number]  Reason for Admission:   Discharge Date: 20 RARS: Readmission Risk Score: 22         Spoke with: Sophia Da Silva, patient's daughter    Care Transitions Subsequent and Final Call    Subsequent and Final Calls  Have your medications changed?:  No  Do you have any questions related to your medications?:  No  Do you currently have any active services?:  Yes  Are you currently active with any services?:  Home Health  Do you have any needs or concerns that I can assist you with?:  No  Care Transitions Interventions                          Other Interventions: Follow Up:  Received return phone call from patient's daughter Sophia Da Silva. She states her mother is very tired and still very weak. She continues to have shortness of breath with minimal exertion. [place] is using oxygen at night only. Sophia Da Silva states her mother has a same day appointment this afternoon. Reports that it is very difficult to take patient to appointments and very hard on her mother. Sophia Da Silva will discuss issue when she goes in to see the MD today. She does not have any questions or concerns at this time.       Future Appointments   Date Time Provider Anika Celaya   2020  4:00 PM Leatha Langley MD [address]   3/11/2020  1:00 PM Leatha Langley MD [address] RN

## 2020-02-19 NOTE — PROGRESS NOTES
Visit Information    Have you changed or started any medications since your last visit including any over-the-counter medicines, vitamins, or herbal medicines? no   Are you having any side effects from any of your medications? -  no  Have you stopped taking any of your medications? Is so, why? -  no    Have you seen any other physician or provider since your last visit? No  Have you had any other diagnostic tests since your last visit? No  Have you been seen in the emergency room and/or had an admission to a hospital since we last saw you? No  Have you had your routine dental cleaning in the past 6 months? yes -     Have you activated your [employer] account? If not, what are your barriers? Yes     Patient Care Team:  Dimple Doyle MD as PCP - General (Internal Medicine)  Dimple Doyle MD as PCP - [facility] Provider  Alexander Ellison DO as Consulting Physician (Obstetrics & Gynecology)  Margaret Murry MD as Consulting Physician (Nephrology)  David Méndez MD as Consulting Physician (Gastroenterology)  Elisa Loredo RN as Care Transitions Nurse    Medical History Review  Past Medical, Family, and Social History reviewed and does contribute to the patient presenting condition    Health Maintenance   Topic Date Due    DTaP/Tdap/Td vaccine (1 - Tdap) 09/29/1938    Shingles Vaccine (1 of 2) 09/29/1977    Annual Wellness Visit (AWV)  10/15/2020    TSH testing  02/04/2021    Potassium monitoring  02/08/2021    Creatinine monitoring  02/08/2021    Flu vaccine  Completed    Pneumococcal 65+ yrs at Risk Vaccine  Completed    Hepatitis A vaccine  Aged Out    Hepatitis B vaccine  Aged Out    Hib vaccine  Aged Out    Meningococcal (ACWY) vaccine  Aged Out     Chief Complaint   Patient presents with    Fatigue     Pt is here with c/o fatigue and severe weakness, sleeping alot. Onset last thursday.  Obesity     [address] GAP    Dementia     [address] GAP    Health Maintenance     shingles and dtap due. SUBJECTIVE:  [place] is a 80 y.o. female patient who  comes for complaints of   Chief Complaint   Patient presents with    Fatigue     Pt is here with c/o fatigue and severe weakness, sleeping alot. Onset last thursday.  Obesity     [address] GAP    Dementia     [address] G[facility]    Health Maintenance     shingles and dtap due. Pt was admitted twice in last couple months with hyponatremia, also hypokalemia  bumex changed to torsemide at last discharge which was 2/8  Also being weaned off zoloft       Daughter reports recurrence of fatigue and weakness since last1 week. Denies any chest pain, shortness of breath, abdominal pain, nausea, diarrhea. REVIEW OF SYSTEMS (except Subjective (HPI))  GENERAL: No fevers / chills. Positive for malaise  RESPIRATORY: Positive for chronic cough, negative for wheezing or shortness of breath  CARDIOVASCULAR: Negative for chest pain or palpitations. GI: no nausea, vomiting, or diarrhea  NEURO: No history of headaches    Past Medical History:   Diagnosis Date    [facility]     CH[facility]congestive [facility] failure) [facility]     Chronic kidney disease     COPD [facility]ch[facility] obstructive [facility] disease) [facility]     Coronary artery disease involving native coronary artery of native heart with unstable angina pectoris ([facility] 6/23/2017    Depression     Esophageal dysphagia     GERD (gastroesophageal reflux disease)     History of skin cancer     nose x3    HTN (hypertension) 4/13/2016    Hypothyroidism     MI (my[facility] infarction) ([facility] 2016    Osteoarthritis     Sleep apnea     Does not use machine       SOCIAL HISTORY:  Social History     Socioeconomic History    Marital status:       Spouse name: Not on file    Number of children: Not on file    Years of education: Not on file    Highest education level: Not on file   Occupational History    Not on file   Social Needs    Financial resource strain: Not on file    Food insecurity:     Worry: Not on file     Inability: Not on file    Transportation needs:     Medical: Not on file     Non-medical: Not on file   Tobacco Use    Smoking status: Former Smoker    Smokeless tobacco: Never Used   Substance and Sexual Activity    Alcohol use: No     Alcohol/week: 0.0 standard drinks    Drug use: No    Sexual activity: Not Currently     Birth control/protection: Surgical     Comment: hyst   Lifestyle    Physical activity:     Days per week: Not on file     Minutes per session: Not on file    Stress: Not on file   Relationships    Social connections:     Talks on phone: Not on file     Gets together: Not on file     Attends Restoration service: Not on file     Active member of club or organization: Not on file     Attends meetings of clubs or organizations: Not on file     Relationship status: Not on file    Intimate partner violence:     Fear of current or ex partner: Not on file     Emotionally abused: Not on file     Physically abused: Not on file     Forced sexual activity: Not on file   Other Topics Concern    Not on file   Social History Narrative    Not on file           CURRENT MEDICATIONS:  Current Outpatient Medications   Medication Sig Dispense Refill    MUCINEX 600 MG extended release tablet TAKE 1 TABLET BY MOUTH 2 TIMES DAILY 60 tablet 2    torsemide (DEMADEX) 20 MG tablet Take 2 tablets by mouth daily 60 tablet 0    primidone (MYSOLINE) 50 MG tablet Take 50 mg by mouth 2 times daily      metoprolol tartrate (LOPRESSOR) 25 MG tablet Take 12.5 mg by mouth nightly      Glucosamine HCl-MSM 1500-500 MG/30ML LIQD Take 30 mLs by mouth daily      polyethylene glycol (GLYCOLAX) powder Take 17 g by mouth daily as needed (constipation)      acetaminophen (TYLENOL) 325 MG tablet Take 650 mg by mouth every 6 hours as needed for Pain      miconazole (MICOTIN) 2 % powder Apply topically 2 times daily.  45 g 1    levothyroxine (SYNTHROID) 100 MCG tablet Take 1 tablet by mouth daily 30 tablet 11    buPROPion (WELLBUTRIN SR) 100 MG extended release tablet Take 1 tablet by mouth 2 times daily 60 tablet 3    nitroGLYCERIN (NITROSTAT) 0.4 MG SL tablet up to max of 3 total doses. If no relief after 1 dose, call 911. 25 tablet 3    Nutritional Supplements (ENSURE ORIGINAL) LIQD Take 1 Bottle by mouth 2 times daily 60 Bottle 2    potassium chloride (KLOR-CON M) 20 MEQ extended release tablet TAKE 1 TABLET BY MOUTH DAILY 90 tablet 1    metOLazone (ZAROXOLYN) 2.5 MG tablet Take 1 tablet by mouth every 72 hours 30 tablet 3    spironolactone (ALDACTONE) 25 MG tablet Take 1 tablet by mouth daily 30 tablet 3    famotidine (PEPCID) 20 MG tablet TAKE 1 TABLET BY MOUTH 2 TIMES DAILY 60 tablet 8    loratadine (CLARITIN) 10 MG tablet Take 10 mg by mouth daily   5    cyanocobalamin 1000 MCG tablet Take 1,000 mcg by mouth daily      fluticasone (FLONASE) 50 MCG/ACT nasal spray 1 spray by Each Nare route daily 1 Spray in each nostril 2 Bottle 1    ranolazine (RANEXA) 500 MG extended release tablet Take 500 mg by mouth 2 times daily      docusate sodium (COLACE, DULCOLAX) 100 MG CAPS Take 100 mg by mouth daily 30 capsule 3    aspirin 81 MG EC tablet Take 81 mg by mouth daily       Multiple Vitamins-Minerals (THERAPEUTIC MULTIVITAMIN-MINERALS) tablet Take 1 tablet by mouth daily. No current facility-administered medications for this visit. OBJECTIVE:  Vitals:    02/19/20 1558   BP: 116/64   Pulse: 92   Resp: 18   Temp: 98.3 °F (36.8 °C)   SpO2: 99%     Body mass index is 41.95 kg/m². General exam (except above):  General appearance - well appearing, alert, in no acute distress, comes in wheelchair  Head - Atraumatic, normocephalic  Eyes - EOMI, no jaundice or pallor  Lungs - Lungs clear to auscultation. No wheezing, rhonchi, rales  Heart - RRR without murmur, gallop, or rubs. No ectopy  Abdomen - Abdomen soft, non-tender.  Bowel sounds normal. No masses, organomegaly  Extremities -No significant edema, or skin discoloration. Good capillary refill. Neuro - Pt Alert, awake and oriented x 3. No gross focal neurological deficits    ASSESSMENT AND PLAN (MEDICAL DECISION MAKING):   [place] was seen today for fatigue, obesity, dementia and health maintenance. Diagnoses and all orders for this visit:    Fatigue, unspecified type  Comments:  Check CBC and BMP due to history of recent hyponatremia and hypokalemia. Orders:  -     CBC; Future    Depression, unspecified depression type    CKD (chronic kidney disease) stage 4, GFR 15-29 ml/min ([facility])    Diuretic-induced hypokalemia  Comments:  Recheck labs    Hyponatremia           Follow up in: With results    Greater than 15 minutes spent face-to-face with patient of which more than 50% was spent reviewing the past medical history, addressing the current concerns, counseling and formulating a mutual plan to help coordinate the care    This note was partially generated using Dragon voice recognition system, any errors noted are unintentional and are due to this technology.     Luis Fernando Marquez MD

## 2020-02-19 NOTE — CARE COORDINATION
[facility] Transitions Follow Up Call    2020    Patient: Negrita Crawford  Patient : 1927   MRN: <[id number]>  Reason for Admission: Hyponatremia, generalized weakness  Discharge Date: 20 RARS: Readmission Risk Score: 25      Follow Up: Attempted to contact patient for transition follow up. Unable to reach patient. Left message on voicemail with contact information and request for call back.      Future Appointments   Date Time Provider Anika Celaya   2020  4:00 PM Kayleigh Schneider MD [address]   3/11/2020  1:00 PM Kayleigh Schneider MD [address] RN

## 2020-02-20 ENCOUNTER — TELEPHONE (OUTPATIENT)
Dept: INTERNAL MEDICINE CLINIC | Age: 85
End: 2020-02-20

## 2020-02-20 NOTE — TELEPHONE ENCOUNTER
Please inform patient/daughter that blood tests do indicate worsening of kidney function. This is most likely due to dehydration. I recommend increased intake of fluids-sugar-free Gatorade would be recommended. Specifically, sodium and potassium were normal.  We need to recheck the lab work in a couple of days to ensure improvement. Order filed.     Helen Hunter

## 2020-02-20 NOTE — TELEPHONE ENCOUNTER
----- Message from Matilde Wong PA-C sent at 2/20/2020  6:57 AM EST -----    ----- Message -----  From: Christelle Mckenzie Incoming Lab Results From [facility]  Sent: 2/19/2020   9:25 PM EST  To: Matilde Wong PA-C

## 2020-02-20 NOTE — TELEPHONE ENCOUNTER
Called patient she needs to reduce coffee and tea intake- no more than couple cups a day. - voicemail left. Please relay this message if they call back.      Loyal Phoenix

## 2020-02-20 NOTE — TELEPHONE ENCOUNTER
Daughter informed and states she is drinking about 50 ounces of coffee and tea daily.  Advised your instructions

## 2020-02-20 NOTE — TELEPHONE ENCOUNTER
Was seen by Dr. Tasha Emmanuel yesterday, labs appear stable Na 135, Cr increased to 1.63, please forward for further orders.

## 2020-02-21 ENCOUNTER — CARE COORDINATION (OUTPATIENT)
Dept: CASE MANAGEMENT | Age: 85
End: 2020-02-21

## 2020-02-24 ENCOUNTER — TELEPHONE (OUTPATIENT)
Dept: INTERNAL MEDICINE CLINIC | Age: 85
End: 2020-02-24

## 2020-02-24 ENCOUNTER — HOSPITAL ENCOUNTER (OUTPATIENT)
Age: 85
Setting detail: SPECIMEN
Discharge: HOME | End: 2020-02-24
Payer: COMMERCIAL

## 2020-02-24 LAB
ANION GAP SERPL CALCULATED.3IONS-SCNC: 15 MMOL/L (ref 9–17)
BUN BLDV-MCNC: 34 MG/DL (ref 8–23)
BUN/CREAT BLD: ABNORMAL (ref 9–20)
CALCIUM SERPL-MCNC: 9.1 MG/DL (ref 8.6–10.4)
CHLORIDE BLD-SCNC: 90 MMOL/L (ref 98–107)
CO2: 26 MMOL/L (ref 20–31)
CREAT SERPL-MCNC: 1.66 MG/DL (ref 0.5–0.9)
GFR AFRICAN AMERICAN: 35 ML/MIN
GFR NON-AFRICAN AMERICAN: 29 ML/MIN
GFR SERPL CREATININE-BSD FRML MDRD: ABNORMAL ML/MIN/{1.73_M2}
GFR SERPL CREATININE-BSD FRML MDRD: ABNORMAL ML/MIN/{1.73_M2}
GLUCOSE BLD-MCNC: 87 MG/DL (ref 70–99)
POTASSIUM SERPL-SCNC: 4.4 MMOL/L (ref 3.7–5.3)
SODIUM BLD-SCNC: 131 MMOL/L (ref 135–144)

## 2020-02-26 ENCOUNTER — TELEPHONE (OUTPATIENT)
Dept: INTERNAL MEDICINE CLINIC | Age: 85
End: 2020-02-26

## 2020-02-28 ENCOUNTER — TELEPHONE (OUTPATIENT)
Dept: INTERNAL MEDICINE CLINIC | Age: 85
End: 2020-02-28

## 2020-03-02 ENCOUNTER — TELEPHONE (OUTPATIENT)
Dept: INTERNAL MEDICINE CLINIC | Age: 85
End: 2020-03-02

## 2020-03-03 RX ORDER — SPIRONOLACTONE 25 MG/1
25 TABLET ORAL DAILY
Qty: 30 TABLET | Refills: 3 | Status: SHIPPED | OUTPATIENT
Start: 2020-03-03

## 2020-03-09 ENCOUNTER — HOSPITAL ENCOUNTER (OUTPATIENT)
Age: 85
Setting detail: SPECIMEN
Discharge: HOME | End: 2020-03-09
Payer: COMMERCIAL

## 2020-03-09 LAB
ANION GAP SERPL CALCULATED.3IONS-SCNC: 14 MMOL/L (ref 9–17)
BUN BLDV-MCNC: 26 MG/DL (ref 8–23)
BUN/CREAT BLD: ABNORMAL (ref 9–20)
CALCIUM SERPL-MCNC: 8.9 MG/DL (ref 8.6–10.4)
CHLORIDE BLD-SCNC: 95 MMOL/L (ref 98–107)
CO2: 26 MMOL/L (ref 20–31)
CREAT SERPL-MCNC: 1.7 MG/DL (ref 0.5–0.9)
GFR AFRICAN AMERICAN: 34 ML/MIN
GFR NON-AFRICAN AMERICAN: 28 ML/MIN
GFR SERPL CREATININE-BSD FRML MDRD: ABNORMAL ML/MIN/{1.73_M2}
GFR SERPL CREATININE-BSD FRML MDRD: ABNORMAL ML/MIN/{1.73_M2}
GLUCOSE BLD-MCNC: 84 MG/DL (ref 70–99)
POTASSIUM SERPL-SCNC: 4.6 MMOL/L (ref 3.7–5.3)
SODIUM BLD-SCNC: 135 MMOL/L (ref 135–144)

## 2020-03-09 RX ORDER — TORSEMIDE 20 MG/1
40 TABLET ORAL DAILY
Qty: 60 TABLET | Refills: 0 | Status: SHIPPED | OUTPATIENT
Start: 2020-03-09 | End: 2020-03-31

## 2020-03-10 ENCOUNTER — TELEPHONE (OUTPATIENT)
Dept: INTERNAL MEDICINE CLINIC | Age: 85
End: 2020-03-10

## 2020-03-11 ENCOUNTER — OFFICE VISIT (OUTPATIENT)
Dept: INTERNAL MEDICINE CLINIC | Age: 85
End: 2020-03-11
Payer: COMMERCIAL

## 2020-03-11 VITALS
DIASTOLIC BLOOD PRESSURE: 59 MMHG | RESPIRATION RATE: 20 BRPM | SYSTOLIC BLOOD PRESSURE: 137 MMHG | WEIGHT: 192 LBS | HEART RATE: 76 BPM | BODY MASS INDEX: 43.19 KG/M2 | HEIGHT: 56 IN | OXYGEN SATURATION: 98 %

## 2020-03-11 PROCEDURE — 99213 OFFICE O/P EST LOW 20 MIN: CPT | Performed by: INTERNAL MEDICINE

## 2020-03-11 NOTE — PROGRESS NOTES
Visit Information    Have you changed or started any medications since your last visit including any over-the-counter medicines, vitamins, or herbal medicines? no   Are you having any side effects from any of your medications? -  no  Have you stopped taking any of your medications? Is so, why? -  no    Have you seen any other physician or provider since your last visit? No  Have you had any other diagnostic tests since your last visit? Yes - Records Obtained  Have you been seen in the emergency room and/or had an admission to a hospital since we last saw you? No  Have you had your routine dental cleaning in the past 6 months? no    Have you activated your [employer] account? If not, what are your barriers?  Yes     Patient Care Team:  Lan Graff MD as PCP - General (Internal Medicine)  Lan Graff MD as PCP - [facility]  Cam Arenas DO as Consulting Physician (Obstetrics & Gynecology)  Brian Lara MD as Consulting Physician (Nephrology)  Adrián Fernandez MD as Consulting Physician (Gastroenterology)    Medical History Review  Past Medical, Family, and Social History reviewed and does not contribute to the patient presenting condition    Health Maintenance   Topic Date Due    DTaP/Tdap/Td vaccine (1 - Tdap) 09/29/1946    Shingles Vaccine (1 of 2) 09/29/1977    Annual Wellness Visit (AWV)  10/15/2020    TSH testing  02/04/2021    Potassium monitoring  03/09/2021    Creatinine monitoring  03/09/2021    Flu vaccine  Completed    Pneumococcal 65+ yrs at Risk Vaccine  Completed    Hepatitis A vaccine  Aged Out    Hepatitis B vaccine  Aged Out    Hib vaccine  Aged Out    Meningococcal (ACWY) vaccine  Aged Out     SUBJECTIVE:  Benny Boyd is a 80 y.o. female patient who  comes for complaints of   Chief Complaint   Patient presents with    Depression     follow up    COPD     hcc gap    Obesity     hcc gap    Dementia     hcc gap    Edema     retaining water        Patient was evaluated 1 month ago for fatigue. Recent discharge from hospital on 2/8/2020- diuretic was changed at that time to torsemide. Due to history of recurrent episodes of hyponatremia, we did lab work which showed normal potassium and sodium but did show increase in creatinine, which has persists persistently increased in spite of good hydration and limiting intake of caffeinated drinks. Today, patient reports persistent fatigue which has been slightly worsening over the last 4-5 weeks. I have advised patient patient to make follow-up appointment with her nephrologist for further evaluation of rising creatinine. Her last echo was more than 1 year ago, we will repeat echo as well. Patient  also advised to follow-up with cardiology. REVIEW OF SYSTEMS (except Subjective (HPI))  GENERAL: No fevers / chills, positive for fatigue  RESPIRATORY: Negative for cough, wheezing. Positive for exertional shortness of breath  CARDIOVASCULAR: Negative for chest pain or palpitations. GI: no nausea, vomiting, or diarrhea  NEURO: No history of headaches    Past Medical History:   Diagnosis Date    Bipolar disorder ([facility]     [facility]est[facility] failure) [facility]     Chronic kidney disease     COPD (chronic obstructive pulmonary disease) ([facility]     Coronary artery disease involving native coronary artery of native heart with unstable angina pectoris ([facility] 6/23/2017    Depression     Esophageal dysphagia     GERD (gastroesophageal reflux disease)     History of skin cancer     nose x3    HTN (hypertension) 4/13/2016    Hypothyroidism     MI (myocardial infarction) ([facility] 2016    Osteoarthritis     Sleep apnea     Does not use machine       SOCIAL HISTORY:  Social History     Socioeconomic History    Marital status:       Spouse name: Not on file    Number of children: Not on file    Years of education: Not on file    Highest education level: Not on file   Occupational History    Not on file   Social Needs    Financial resource strain: Not on file    Food insecurity     Worry: Not on file     Inability: Not on file    Transportation needs     Medical: Not on file     Non-medical: Not on file   Tobacco Use    Smoking status: Former Smoker    Smokeless tobacco: Never Used   Substance and Sexual Activity    Alcohol use: No     Alcohol/week: 0.0 standard drinks    Drug use: No    Sexual activity: Not Currently     Birth control/protection: Surgical     Comment: hyst   Lifestyle    Physical activity     Days per week: Not on file     Minutes per session: Not on file    Stress: Not on file   Relationships    Social connections     Talks on phone: Not on file     Gets together: Not on file     Attends Tenriism service: Not on file     Active member of club or organization: Not on file     Attends meetings of clubs or organizations: Not on file     Relationship status: Not on file    Intimate partner violence     Fear of current or ex partner: Not on file     Emotionally abused: Not on file     Physically abused: Not on file     Forced sexual activity: Not on file   Other Topics Concern    Not on file   Social History Narrative    Not on file           CURRENT MEDICATIONS:  Current Outpatient Medications   Medication Sig Dispense Refill    torsemide (DEMADEX) 20 MG tablet Take 2 tablets by mouth daily 60 tablet 0    spironolactone (ALDACTONE) 25 MG tablet TAKE 1 TABLET BY MOUTH DAILY 30 tablet 3    MUCINEX 600 MG extended release tablet TAKE 1 TABLET BY MOUTH 2 TIMES DAILY 60 tablet 2    primidone (MYSOLINE) 50 MG tablet Take 50 mg by mouth 2 times daily      metoprolol tartrate (LOPRESSOR) 25 MG tablet Take 12.5 mg by mouth nightly      Glucosamine HCl-MSM 1500-500 MG/30ML LIQD Take 30 mLs by mouth daily      polyethylene glycol (GLYCOLAX) powder Take 17 g by mouth daily as needed (constipation)      acetaminophen (TYLENOL) 325 MG tablet Take 650 mg by mouth every 6 hours as needed for Pain     

## 2020-03-13 ENCOUNTER — TELEPHONE (OUTPATIENT)
Dept: INTERNAL MEDICINE CLINIC | Age: 85
End: 2020-03-13

## 2020-03-13 RX ORDER — LORATADINE 10 MG/1
10 TABLET ORAL DAILY
Qty: 30 TABLET | Refills: 5 | Status: SHIPPED | OUTPATIENT
Start: 2020-03-13 | End: 2020-08-18

## 2020-03-16 ENCOUNTER — HOSPITAL ENCOUNTER (OUTPATIENT)
Dept: NON INVASIVE DIAGNOSTICS | Age: 85
Discharge: HOME | End: 2020-03-16
Payer: COMMERCIAL

## 2020-03-16 LAB
LV EF: 55 %
LVEF MODALITY: NORMAL

## 2020-03-16 PROCEDURE — 93306 TTE W/DOPPLER COMPLETE: CPT

## 2020-03-17 ENCOUNTER — TELEPHONE (OUTPATIENT)
Dept: INTERNAL MEDICINE CLINIC | Age: 85
End: 2020-03-17

## 2020-03-23 ENCOUNTER — HOSPITAL ENCOUNTER (OUTPATIENT)
Age: 85
Setting detail: SPECIMEN
Discharge: HOME | End: 2020-03-23
Payer: COMMERCIAL

## 2020-03-24 ENCOUNTER — HOSPITAL ENCOUNTER (OUTPATIENT)
Age: 85
Setting detail: SPECIMEN
Discharge: HOME | End: 2020-03-24
Payer: COMMERCIAL

## 2020-03-24 LAB
ALBUMIN (CALCULATED): 4.5 G/DL (ref 3.2–5.2)
ALBUMIN PERCENT: 67 % (ref 45–65)
ALPHA 1 PERCENT: 2 % (ref 3–6)
ALPHA 2 PERCENT: 13 % (ref 6–13)
ALPHA-1-GLOBULIN: 0.2 G/DL (ref 0.1–0.4)
ALPHA-2-GLOBULIN: 0.9 G/DL (ref 0.5–0.9)
BETA GLOBULIN: 0.7 G/DL (ref 0.5–1.1)
BETA PERCENT: 10 % (ref 11–19)
GAMMA GLOBULIN %: 8 % (ref 9–20)
GAMMA GLOBULIN: 0.5 G/DL (ref 0.5–1.5)
PATHOLOGIST: ABNORMAL
PATHOLOGIST: NORMAL
PROTEIN ELECTROPHORESIS, SERUM: ABNORMAL
SERUM IFX INTERP: NORMAL
TOTAL PROT. SUM,%: 100 % (ref 98–102)
TOTAL PROT. SUM: 6.8 G/DL (ref 6.3–8.2)
TOTAL PROTEIN: 6.7 G/DL (ref 6.4–8.3)

## 2020-03-25 LAB
P E INTERPRETATION, U: NORMAL
PATHOLOGIST: NORMAL
SPECIMEN TYPE: NORMAL
URINE TOTAL PROTEIN: 29 MG/DL

## 2020-03-31 RX ORDER — TORSEMIDE 20 MG/1
40 TABLET ORAL DAILY
Qty: 60 TABLET | Refills: 0 | Status: SHIPPED | OUTPATIENT
Start: 2020-03-31 | End: 2020-04-02

## 2020-04-03 RX ORDER — TORSEMIDE 20 MG/1
40 TABLET ORAL DAILY
Qty: 60 TABLET | Refills: 0 | Status: SHIPPED | OUTPATIENT
Start: 2020-04-03 | End: 2020-05-03

## 2020-04-23 ENCOUNTER — HOSPITAL ENCOUNTER (OUTPATIENT)
Age: 85
Setting detail: SPECIMEN
Discharge: HOME | End: 2020-04-23
Payer: COMMERCIAL

## 2020-04-23 LAB
ANION GAP SERPL CALCULATED.3IONS-SCNC: 13 MMOL/L (ref 9–17)
BUN BLDV-MCNC: 27 MG/DL (ref 8–23)
BUN/CREAT BLD: ABNORMAL (ref 9–20)
CALCIUM SERPL-MCNC: 9.1 MG/DL (ref 8.6–10.4)
CHLORIDE BLD-SCNC: 95 MMOL/L (ref 98–107)
CO2: 30 MMOL/L (ref 20–31)
CREAT SERPL-MCNC: 1.77 MG/DL (ref 0.5–0.9)
GFR AFRICAN AMERICAN: 33 ML/MIN
GFR NON-AFRICAN AMERICAN: 27 ML/MIN
GFR SERPL CREATININE-BSD FRML MDRD: ABNORMAL ML/MIN/{1.73_M2}
GFR SERPL CREATININE-BSD FRML MDRD: ABNORMAL ML/MIN/{1.73_M2}
GLUCOSE BLD-MCNC: 95 MG/DL (ref 70–99)
POTASSIUM SERPL-SCNC: 3.8 MMOL/L (ref 3.7–5.3)
SODIUM BLD-SCNC: 138 MMOL/L (ref 135–144)

## 2020-04-27 RX ORDER — BUPROPION HYDROCHLORIDE 100 MG/1
100 TABLET, EXTENDED RELEASE ORAL 2 TIMES DAILY
Qty: 60 TABLET | Refills: 3 | Status: SHIPPED | OUTPATIENT
Start: 2020-04-27

## 2020-04-28 ENCOUNTER — TELEPHONE (OUTPATIENT)
Dept: INTERNAL MEDICINE CLINIC | Age: 85
End: 2020-04-28

## 2020-04-29 ENCOUNTER — CARE COORDINATION (OUTPATIENT)
Dept: CARE COORDINATION | Age: 85
End: 2020-04-29

## 2020-04-29 NOTE — CARE COORDINATION
Spoke with daughter who is requesting a referral to [facility] in [place]. Provider agreed to referral.  ACM spoke with [facility] who will call daughter to schedule a consultation.   ACM will have PCP office fax demographics, H&P from 3.11.20 appointment and med list to [phone]

## 2020-04-29 NOTE — TELEPHONE ENCOUNTER
I will contact the daughter for clarification and place referral if needed. If you are ok with that.

## 2020-04-29 NOTE — TELEPHONE ENCOUNTER
Does she needs something signed, or is she asking for some actual forms? Will redirect to Noni Oakes to help co-ordinate.    Marcelo Boudreaux

## 2020-04-30 ENCOUNTER — CARE COORDINATION (OUTPATIENT)
Dept: CARE COORDINATION | Age: 85
End: 2020-04-30

## 2020-04-30 ENCOUNTER — TELEPHONE (OUTPATIENT)
Dept: INTERNAL MEDICINE CLINIC | Age: 85
End: 2020-04-30

## 2020-04-30 RX ORDER — DOCUSATE SODIUM 100 MG/1
100 CAPSULE, LIQUID FILLED ORAL 2 TIMES DAILY
Qty: 60 CAPSULE | Refills: 0 | Status: SHIPPED | OUTPATIENT
Start: 2020-04-30 | End: 2020-05-30

## 2020-04-30 NOTE — TELEPHONE ENCOUNTER
Vital signs are stable, start mag hydroxide 400 mg/5ml take 30 mL once daily at bedtime, add docusate sodium 100 mg BID. Continue to monitor, present to emergency room if no bowel movement over next day, abdominal pain or nausea/vomiting develop, may need imaging to rule out obstruction.

## 2020-05-01 ENCOUNTER — CARE COORDINATION (OUTPATIENT)
Dept: CARE COORDINATION | Age: 85
End: 2020-05-01

## 2020-05-01 NOTE — CARE COORDINATION
Patients daughter called stating she received Camilla's last lab results and noticed her potassium is lower than it had been. ACM explained that the lab value is still in normal range. She is on a diuretic. Patient has a consult with hospice today. ACM recommended discussing with them the plan to monitor labs and if she would like to request a repeat lab she could call [facility] Monday and we will route a request to PCP.

## 2020-05-27 RX ORDER — SERTRALINE HYDROCHLORIDE 100 MG/1
100 TABLET, FILM COATED ORAL DAILY
Qty: 90 TABLET | Refills: 3 | Status: SHIPPED | OUTPATIENT
Start: 2020-05-27

## 2020-08-18 RX ORDER — LORATADINE 10 MG/1
10 TABLET ORAL DAILY
Qty: 30 TABLET | Refills: 5 | Status: SHIPPED | OUTPATIENT
Start: 2020-08-18

## 2022-06-03 NOTE — CARE COORDINATION
DISCHARGE PLANNING NOTE:    Plan is for this patient to go to [facility]. LSW is following and referral was made. PT recs SNF - Awaiting precert that should be started today per LSW. [facility] - 22%. Will continue to follow along.      Electronically signed by Quinton Mercado RN on 7/11/2019 at 8:57 AM [Parents] : parents

## 2022-12-17 NOTE — TELEPHONE ENCOUNTER
Will need to address with Dr Tyrel Perez, will attempt to call him again tomorrow BMI:   HbA1c:   Glucose: POCT Blood Glucose.: 128 mg/dL (12-14-22 @ 21:01)    BP: 115/66 (12-17-22 @ 11:53) (115/66 - 138/89)  Lipid Panel:

## 2025-06-10 NOTE — ED PROVIDER NOTES
[address]  Emergency Department Encounter  Emergency Medicine Resident     Patient Name: Monalisa Arce  MRN: [id number]  YOB: 1927  Date of evaluation: 6/9/19  PCP:  Charlie Sepulveda MD    CHIEF COMPLAINT       Chief Complaint   Patient presents with    Dysphagia       HISTORY OF PRESENT ILLNESS  (Location/Symptom, Timing/Onset, Context/Setting, Quality, Duration, Modifying Factors, Severity.)      Monalisa Arce is a 80 y.o. female who presents following an episode of coughing. Patient has a history of dysphagia for which she had an EGD done in September 2018 which was largely unremarkable. Patient was eating a doughnut earlier today, leaning back in her chair. Patient reports she felt like she choked on a piece of doughnut and then ended up in a violent coughing fit. Patient has a history of difficulty coughing up phlegm, therefore this was very distressing to the patient. Patient declines shortness of breath, chest pain, abdominal pain at this time. Patient reports she feels back to normal.  She was recently on antibiotics for sinus infection. Patient's daughter is in the room, and is requesting a chest x-ray to make sure she doesn't have bronchitis. PAST MEDICAL / SURGICAL / SOCIAL / FAMILY HISTORY      has a past medical history of Bipolar disorder ([address] CHF (congestive heart failure) ([address] Chronic kidney disease, COPD (chronic obstructive pulmonary disease) ([address] Coronary artery disease involving native coronary artery of native heart with unstable angina pectoris ([address] Depression, Esophageal dysphagia, GERD (gastroesophageal reflux disease), History of skin cancer, HTN (hypertension), Hypothyroidism, MI (myocardial infarction) ([address] Osteoarthritis, and Sleep apnea. has a past surgical history that includes Cholecystectomy; bladder suspension; Nose surgery; Cataract removal (Bilateral); Hysterectomy;  Salpingo-oophorectomy; Cardiac catheterization (4-13); and Upper gastrointestinal endoscopy (N/A, 9/4/2018). Social History     Socioeconomic History    Marital status:      Spouse name: Not on file    Number of children: Not on file    Years of education: Not on file    Highest education level: Not on file   Occupational History    Not on file   Social Needs    Financial resource strain: Not on file    Food insecurity:     Worry: Not on file     Inability: Not on file    Transportation needs:     Medical: Not on file     Non-medical: Not on file   Tobacco Use    Smoking status: Former Smoker    Smokeless tobacco: Never Used   Substance and Sexual Activity    Alcohol use: No     Alcohol/week: 0.0 oz    Drug use: No    Sexual activity: Not Currently     Birth control/protection: Surgical     Comment: hyst   Lifestyle    Physical activity:     Days per week: Not on file     Minutes per session: Not on file    Stress: Not on file   Relationships    Social connections:     Talks on phone: Not on file     Gets together: Not on file     Attends Confucianist service: Not on file     Active member of club or organization: Not on file     Attends meetings of clubs or organizations: Not on file     Relationship status: Not on file    Intimate partner violence:     Fear of current or ex partner: Not on file     Emotionally abused: Not on file     Physically abused: Not on file     Forced sexual activity: Not on file   Other Topics Concern    Not on file   Social History Narrative    Not on file       Family History   Problem Relation Age of Onset    Heart Disease Mother     Cancer Father     Stroke Sister     Heart Disease Sister      Allergies:  Dye [iodides]    Home Medications:  Prior to Admission medications    Medication Sig Start Date End Date Taking?  Authorizing Provider   sertraline (ZOLOFT) 100 MG tablet Take 1 tablet by mouth daily 5/28/19   Adriana Dueñas MD   potassium chloride (KLOR-CON M) 20 MEQ extended release tablet TAKE 1 TABLET BY MOUTH DAILY 5/14/19   Lucia Luna MD   primidone (MYSOLINE) 50 MG tablet Take 1 tablet by mouth 3 times daily 4/24/19   Jackie Washburn MD   metolazone (ZAROXOLYN) 2.5 MG tablet Take one tablet every other day 4/1/19   Christiana Trejo MD   bumetanide (BUMEX) 1 MG tablet Take 3 tablets in am and 2 tablets in pm  Patient taking differently: Take 1 mg by mouth Take 3 tablets in am and 3 tablets in pm 3/20/19   Christiana Trejo MD   nitroGLYCERIN (NITROSTAT) 0.4 MG SL tablet up to max of 3 total doses. If no relief after 1 dose, call 911. 2/20/19   Baljinder Gutierrez MD   fluticasone (FLONASE) 50 MCG/ACT nasal spray 1 spray by Each Nare route daily 1 Spray in each nostril  Patient taking differently: 1 spray by Each Nare route daily as needed 1 Spray in each nostril 1/25/19   Jackie Washburn MD   metoprolol tartrate (LOPRESSOR) 25 MG tablet TAKE 0.5 TABLETS BY MOUTH DAILY 11/2/18   Jackie Washburn MD   levothyroxine (SYNTHROID) 100 MCG tablet Take 1 tablet by mouth daily 10/29/18   Jackie Washburn MD   ranolazine (RANEXA) 500 MG extended release tablet Take 500 mg by mouth 2 times daily    Historical Provider, MD   docusate sodium (COLACE, DULCOLAX) 100 MG CAPS Take 100 mg by mouth daily 4/19/16   ANA Walker - CNP   aspirin 81 MG tablet Take 81 mg by mouth daily. Historical Provider, MD   Multiple Vitamins-Minerals (THERAPEUTIC MULTIVITAMIN-MINERALS) tablet Take 1 tablet by mouth daily. Historical Provider, MD       REVIEW OF SYSTEMS    (2-9 systems for level 4, 10 or more for level 5)      Review of Systems   Respiratory: Positive for cough. Negative for shortness of breath. Cardiovascular: Negative for chest pain. Gastrointestinal: Negative for abdominal pain.        PHYSICAL EXAM   (up to 7 for level 4, 8 or more for level 5)      INITIAL VITALS:   BP (!) 149/65   Pulse 73   Temp 97.9 °F (36.6 °C) (Oral)   Resp 19   Ht 4' 11\" (1.499 m)   Wt 178 lb (80.7 kg)   LMP  (LMP Unknown)   SpO2 98% BMI 35.95 kg/m²     Physical Exam   Constitutional: She is oriented to person, place, and time. She appears well-developed and well-nourished. No distress. HENT:   Head: Normocephalic and atraumatic. Eyes: Right eye exhibits no discharge. Left eye exhibits no discharge. Neck: No tracheal deviation present. Cardiovascular: Normal rate, regular rhythm and normal heart sounds. Pulmonary/Chest: Effort normal and breath sounds normal. No stridor. No respiratory distress. She has no wheezes. She has no rales. Abdominal: Soft. Bowel sounds are normal. She exhibits no distension. There is no tenderness. There is no guarding. Musculoskeletal: Normal range of motion. She exhibits no deformity. Neurological: She is alert and oriented to person, place, and time. Skin: Skin is warm and dry. No rash (on exposed skin) noted. She is not diaphoretic. Psychiatric: She has a normal mood and affect. Her behavior is normal.   Nursing note and vitals reviewed. WORK-UP     PLAN (LABS / IMAGING /EKG):  Orders Placed This Encounter   Procedures    XR CHEST STANDARD (2 VW)       MEDICATIONS ORDERED:  No orders of the defined types were placed in this encounter. DIAGNOSTIC RESULTS / EMERGENCY DEPARTMENT COURSE /MDM / DIFFERENTIAL DIAGNOSIS     LABS:  No results found for this visit on 06/09/19. RADIOLOGY:  Xr Chest Standard (2 Vw)    Result Date: 6/9/2019  EXAMINATION: TWO XRAY VIEWS OF THE CHEST 6/9/2019 4:30 pm COMPARISON: None. HISTORY: ORDERING SYSTEM PROVIDED HISTORY: coughing TECHNOLOGIST PROVIDED HISTORY: coughing Ordering Physician Provided Reason for Exam: coughing Acuity: Unknown Type of Exam: Unknown FINDINGS: The lungs are clear. The cardiac and mediastinal contours are normal.  There is no pleural effusion or pneumothorax. No acute osseous abnormality is identified. No acute cardiopulmonary abnormality.       EKG  None    All EKG's are interpreted by the Emergency Department Physician who either signs or Co-signs this chart in the absence of a cardiologist.    DIFFERENTIAL DIAGNOSIS:  Choking episode, pneumonia, bronchitis, dysphagia, food impaction    EMERGENCY DEPARTMENT COURSE & MDM:  80 y.o. female presents with a chief complaint of chronic dysphasia with a choking episode that resolved spontaneously earlier today. Patient's vitals are stable. Patient is well-appearing and in no acute distress, sitting comfortably in the exam room bed speaking in full nonlabored sentences and paragraphs. Patient was given a couple of water, which she drank completely and without difficulty. After drinking the water, patient reports she is feeling much better. Given that patient is not drooling, and is swallowing liquids without difficulty, doubt food impaction. Given that daughter is requesting an x-ray to assess for possible bronchitis given patient's recent phlegm production, will order chest x-ray. Dr. Shea Pina spoke with Dr. Alva Rizvi of GI, and he agrees that the patient can follow-up as an outpatient for her chronic dysphasia. ED Course as of Jun 09 1717   Yuan Mota Jun 09, 2019   1701 On reassessment, patient is sitting comfortably in exam room bed, reading a magazine. Patient's chest x-ray is negative for acute process including no pneumonia, pneumothorax, or widened mediastinum to suggest aortic dissection. Will discharge patient at this time as she passed po challenge without difficulty and she has outpatient GI follow up. Additional verbal and written discharge instructions and return precautions were given to patient. All questions were answered prior to discharge. [BJ]      ED Course User Index  [BJ] Doris Hunter DO     PROCEDURES:  None    CONSULTS:  None    CRITICAL CARE:  Please see attending physician note. FINAL IMPRESSION      1.  History of dysphagia          DISPOSITION / PLAN     DISPOSITION Decision To Discharge 06/09/2019 05:15:43 PM    PATIENT REFERRED TO:  Ashley Davis MD Preethi Jacobson, [address]  [address] [address]  [phone]    Schedule an appointment as soon as possible for a visit         DISCHARGE MEDICATIONS:  New Prescriptions    No medications on file       Geraldo Rivas DO  Emergency Medicine Resident    (Please note that portions ofthis note were completed with a voice recognition program.  Efforts were made to edit the dictations but occasionally words are mis-transcribed.)       Geraldo Rivas DO  Resident  06/09/19 2998 show

## (undated) DEVICE — CANNULA NSL AD TBNG L7FT PVC STR NONFLARED PRNG O2 DEL W STD

## (undated) DEVICE — DEFENDO AIR WATER SUCTION AND BIOPSY VALVE KIT FOR  OLYMPUS: Brand: DEFENDO AIR/WATER/SUCTION AND BIOPSY VALVE

## (undated) DEVICE — Z INACTIVE USE 2525529 CONNECTOR TBNG FOR O2

## (undated) DEVICE — GLOVE ORANGE PI 8   MSG9080

## (undated) DEVICE — JELLY,LUBE,STERILE,FLIP TOP,TUBE,2-OZ: Brand: MEDLINE

## (undated) DEVICE — Z DISCONTINUED USE 2424143 ADAPTER O2 SWVL CHRISTMAS TREE GRN

## (undated) DEVICE — BITEBLOCK 54FR W/ DENT RIM BLOX

## (undated) DEVICE — Z DUPLICATE USE 2527422 TUBING O2 STD 7 FT EXTN NO CRUSH VISUAL CNTRST LF

## (undated) DEVICE — FORCEPS BX L240CM JAW DIA2.4MM ORNG L CAP W/ NDL DISP RAD

## (undated) DEVICE — GOWN,POLY REINFORCED,LG: Brand: MEDLINE